# Patient Record
Sex: MALE | Race: WHITE | Employment: OTHER | URBAN - METROPOLITAN AREA
[De-identification: names, ages, dates, MRNs, and addresses within clinical notes are randomized per-mention and may not be internally consistent; named-entity substitution may affect disease eponyms.]

---

## 2017-03-20 ENCOUNTER — HOSPITAL ENCOUNTER (EMERGENCY)
Age: 27
Discharge: HOME | End: 2017-03-20
Attending: EMERGENCY MEDICINE
Payer: SELF-PAY

## 2017-03-20 VITALS
HEART RATE: 91 BPM | OXYGEN SATURATION: 98 % | BODY MASS INDEX: 32.47 KG/M2 | TEMPERATURE: 97.7 F | DIASTOLIC BLOOD PRESSURE: 93 MMHG | WEIGHT: 245 LBS | HEIGHT: 73 IN | SYSTOLIC BLOOD PRESSURE: 161 MMHG | RESPIRATION RATE: 18 BRPM

## 2017-03-20 DIAGNOSIS — J01.90 ACUTE NON-RECURRENT SINUSITIS, UNSPECIFIED LOCATION: Primary | ICD-10-CM

## 2017-03-20 DIAGNOSIS — R04.0 EPISTAXIS: ICD-10-CM

## 2017-03-20 DIAGNOSIS — I10 ESSENTIAL HYPERTENSION: ICD-10-CM

## 2017-03-20 LAB
ANION GAP BLD CALC-SCNC: 6 MMOL/L (ref 3–18)
APTT PPP: 33.6 SEC (ref 23–36.4)
ATRIAL RATE: 75 BPM
BASOPHILS # BLD AUTO: 0 K/UL (ref 0–0.1)
BASOPHILS # BLD: 0 % (ref 0–2)
BUN SERPL-MCNC: 15 MG/DL (ref 7–18)
BUN/CREAT SERPL: 16 (ref 12–20)
CALCIUM SERPL-MCNC: 9.6 MG/DL (ref 8.5–10.1)
CALCULATED P AXIS, ECG09: 68 DEGREES
CALCULATED R AXIS, ECG10: 46 DEGREES
CALCULATED T AXIS, ECG11: 56 DEGREES
CHLORIDE SERPL-SCNC: 103 MMOL/L (ref 100–108)
CK MB CFR SERPL CALC: 0.8 % (ref 0–4)
CK MB SERPL-MCNC: 1 NG/ML (ref 5–25)
CK SERPL-CCNC: 133 U/L (ref 39–308)
CO2 SERPL-SCNC: 32 MMOL/L (ref 21–32)
CREAT SERPL-MCNC: 0.92 MG/DL (ref 0.6–1.3)
DIAGNOSIS, 93000: NORMAL
DIFFERENTIAL METHOD BLD: ABNORMAL
EOSINOPHIL # BLD: 0.1 K/UL (ref 0–0.4)
EOSINOPHIL NFR BLD: 2 % (ref 0–5)
ERYTHROCYTE [DISTWIDTH] IN BLOOD BY AUTOMATED COUNT: 12.5 % (ref 11.6–14.5)
GLUCOSE SERPL-MCNC: 96 MG/DL (ref 74–99)
HCT VFR BLD AUTO: 44.6 % (ref 36–48)
HGB BLD-MCNC: 15.2 G/DL (ref 13–16)
INR PPP: 1 (ref 0.8–1.2)
LYMPHOCYTES # BLD AUTO: 21 % (ref 21–52)
LYMPHOCYTES # BLD: 1.8 K/UL (ref 0.9–3.6)
MCH RBC QN AUTO: 31.1 PG (ref 24–34)
MCHC RBC AUTO-ENTMCNC: 34.1 G/DL (ref 31–37)
MCV RBC AUTO: 91.2 FL (ref 74–97)
MONOCYTES # BLD: 1 K/UL (ref 0.05–1.2)
MONOCYTES NFR BLD AUTO: 11 % (ref 3–10)
NEUTS SEG # BLD: 5.7 K/UL (ref 1.8–8)
NEUTS SEG NFR BLD AUTO: 66 % (ref 40–73)
P-R INTERVAL, ECG05: 158 MS
PLATELET # BLD AUTO: 271 K/UL (ref 135–420)
PMV BLD AUTO: 9.2 FL (ref 9.2–11.8)
POTASSIUM SERPL-SCNC: 3.8 MMOL/L (ref 3.5–5.5)
PROTHROMBIN TIME: 12.5 SEC (ref 11.5–15.2)
Q-T INTERVAL, ECG07: 380 MS
QRS DURATION, ECG06: 94 MS
QTC CALCULATION (BEZET), ECG08: 424 MS
RBC # BLD AUTO: 4.89 M/UL (ref 4.7–5.5)
SODIUM SERPL-SCNC: 141 MMOL/L (ref 136–145)
TROPONIN I SERPL-MCNC: <0.02 NG/ML (ref 0–0.04)
VENTRICULAR RATE, ECG03: 75 BPM
WBC # BLD AUTO: 8.6 K/UL (ref 4.6–13.2)

## 2017-03-20 PROCEDURE — 85025 COMPLETE CBC W/AUTO DIFF WBC: CPT | Performed by: EMERGENCY MEDICINE

## 2017-03-20 PROCEDURE — 85610 PROTHROMBIN TIME: CPT | Performed by: EMERGENCY MEDICINE

## 2017-03-20 PROCEDURE — 99283 EMERGENCY DEPT VISIT LOW MDM: CPT

## 2017-03-20 PROCEDURE — 82550 ASSAY OF CK (CPK): CPT | Performed by: EMERGENCY MEDICINE

## 2017-03-20 PROCEDURE — 93005 ELECTROCARDIOGRAM TRACING: CPT

## 2017-03-20 PROCEDURE — 85730 THROMBOPLASTIN TIME PARTIAL: CPT | Performed by: EMERGENCY MEDICINE

## 2017-03-20 PROCEDURE — 80048 BASIC METABOLIC PNL TOTAL CA: CPT | Performed by: EMERGENCY MEDICINE

## 2017-03-20 RX ORDER — CODEINE PHOSPHATE AND GUAIFENESIN 10; 100 MG/5ML; MG/5ML
5 SOLUTION ORAL
Qty: 120 ML | Refills: 0 | Status: SHIPPED | OUTPATIENT
Start: 2017-03-20 | End: 2018-10-25

## 2017-03-20 RX ORDER — LORATADINE 10 MG/1
TABLET ORAL
Qty: 30 TAB | Refills: 0 | Status: SHIPPED | OUTPATIENT
Start: 2017-03-20 | End: 2018-10-25

## 2017-03-20 RX ORDER — TRIAMCINOLONE ACETONIDE 55 UG/1
SPRAY, METERED NASAL
Qty: 16.5 G | Refills: 0 | Status: SHIPPED | OUTPATIENT
Start: 2017-03-20 | End: 2018-10-25

## 2017-03-20 RX ORDER — AZITHROMYCIN 250 MG/1
TABLET, FILM COATED ORAL
Qty: 6 TAB | Refills: 0 | Status: SHIPPED | OUTPATIENT
Start: 2017-03-20 | End: 2017-08-07

## 2017-03-20 NOTE — LETTER
[address] [place] [address] Dr [facility] EMERGENCY DEPT 
[address] [facility] [address] 
[phone] Work/School Note Date: 3/20/2017 To Whom It May concern: 
 
Edith Ulloa was seen and treated today in the emergency room by the following provider(s): 
Attending Provider: Maria E Carver MD.   
 
Edith Ulloa may return to work on 3/22/17 Sincerely, 
 
 
Dr. Maria E Carver

## 2017-03-20 NOTE — DISCHARGE INSTRUCTIONS
SPECIFIC PATIENT INSTRUCTIONS FROM THE PHYSICIAN WHO TREATED YOU IN THE ER TODAY:  1. Return if any concerns or worsening of condition(s)  2. Zithromax as prescribed until finished. 3.  Nasacort AQ and claritin for the next week and then stop. If sinus congestion recurs, then restart the nasacort AQ and claritin for a week at a time. 4.  Use a Nedi Pot to help clear your sinuses. You may purchase the original Nedi Pot at a pharmacy or get a generic version at [employer] or [employer]. 5.  If prescribed, take the Robitussin AC cough syrup for cough. 6.   FOLLOW UP APPOINTMENT:  Your primary doctor in 1 week. 7.   Have you blood pressure recheck by your doctor this week. It was elevated during your Emergency Department visit today. In the days before you see your doctor, recheck your blood pressure at home 2 times a day at the same times. For example, you may decide to record your blood pressure at 8 am and 9 pm every day. Or 7 am and 7 pm every day. Then take this list of recorded blood pressure readings to your doctor when you follow up with them. This will help guide them about what needs to be done with your blood pressure, if anything. Sinusitis: Care Instructions  Your Care Instructions    Sinusitis is an infection of the lining of the sinus cavities in your head. Sinusitis often follows a cold. It causes pain and pressure in your head and face. In most cases, sinusitis gets better on its own in 1 to 2 weeks. But some mild symptoms may last for several weeks. Sometimes antibiotics are needed. Follow-up care is a key part of your treatment and safety. Be sure to make and go to all appointments, and call your doctor if you are having problems. It's also a good idea to know your test results and keep a list of the medicines you take. How can you care for yourself at home?   · Take an over-the-counter pain medicine, such as acetaminophen (Tylenol), ibuprofen (Advil, Motrin), or naproxen (Aleve). Read and follow all instructions on the label. · If the doctor prescribed antibiotics, take them as directed. Do not stop taking them just because you feel better. You need to take the full course of antibiotics. · Be careful when taking over-the-counter cold or flu medicines and Tylenol at the same time. Many of these medicines have acetaminophen, which is Tylenol. Read the labels to make sure that you are not taking more than the recommended dose. Too much acetaminophen (Tylenol) can be harmful. · Breathe warm, moist air from a steamy shower, a hot bath, or a sink filled with hot water. Avoid cold, dry air. Using a humidifier in your home may help. Follow the directions for cleaning the machine. · Use saline (saltwater) nasal washes to help keep your nasal passages open and wash out mucus and bacteria. You can buy saline nose drops at a grocery store or drugstore. Or you can make your own at home by adding 1 teaspoon of salt and 1 teaspoon of baking soda to 2 cups of distilled water. If you make your own, fill a bulb syringe with the solution, insert the tip into your nostril, and squeeze gently. [place] your nose. · Put a hot, wet towel or a warm gel pack on your face 3 or 4 times a day for 5 to 10 minutes each time. · Try a decongestant nasal spray like oxymetazoline (Afrin). Do not use it for more than 3 days in a row. Using it for more than 3 days can make your congestion worse. When should you call for help? Call your doctor now or seek immediate medical care if:  · You have new or worse swelling or redness in your face or around your eyes. · You have a new or higher fever. Watch closely for changes in your health, and be sure to contact your doctor if:  · You have new or worse facial pain. · The mucus from your nose becomes thicker (like pus) or has new blood in it. · You are not getting better as expected. Where can you learn more?   Go to http://selina-amrit.info/. Enter J466 in the search box to learn more about \"Sinusitis: Care Instructions. \"  Current as of: July 29, 2016  Content Version: 11.1  © 4059-8538 [employer]. Care instructions adapted under license by [employer] (which disclaims liability or warranty for this information). If you have questions about a medical condition or this instruction, always ask your healthcare professional. [address] any warranty or liability for your use of this information. Saline Nasal Washes: Care Instructions  Your Care Instructions  Saline nasal washes help keep the nasal passages open by washing out thick or dried mucus. This simple remedy can help relieve symptoms of allergies, sinusitis, and colds. It also can make the nose feel more comfortable by keeping the mucous membranes moist. You may notice a little burning sensation in your nose the first few times you use the solution, but this usually gets better in a few days. Follow-up care is a key part of your treatment and safety. Be sure to make and go to all appointments, and call your doctor if you are having problems. It's also a good idea to know your test results and keep a list of the medicines you take. How can you care for yourself at home? · You can buy premixed saline solution in a squeeze bottle or other sinus rinse products at a drugstore. Read and follow the instructions on the label. · You also can make your own saline solution by adding 1 teaspoon of salt and 1 teaspoon of baking soda to 2 cups of distilled water. · If you use a homemade solution, pour a small amount into a clean bowl. Using a rubber bulb syringe, squeeze the syringe and place the tip in the salt water. Pull a small amount of the salt water into the syringe by relaxing your hand. · Sit down with your head tilted slightly back. Do not lie down.  Put the tip of the bulb syringe or the squeeze bottle a little way into one of your nostrils. Gently drip or squirt a few drops into the nostril. Repeat with the other nostril. Some sneezing and gagging are normal at first.  · Gently blow your nose. · Wipe the syringe or bottle tip clean after each use. · Repeat this 2 or 3 times a day. · Use nasal washes gently if you have nosebleeds often. When should you call for help? Watch closely for changes in your health, and be sure to contact your doctor if:  · You often get nosebleeds. · You have problems doing the nasal washes. Where can you learn more? Go to http://selina-amrit.info/. Enter [id number] in the search box to learn more about \"Saline Nasal Washes: Care Instructions. \"  Current as of: July 29, 2016  Content Version: 11.1  © 8110-9796 [employer]. Care instructions adapted under license by [employer] (which disclaims liability or warranty for this information). If you have questions about a medical condition or this instruction, always ask your healthcare professional. [address] any warranty or liability for your use of this information. Nosebleeds: Care Instructions  Your Care Instructions    Nosebleeds are common, especially if you have colds or allergies. Many things can cause a nosebleed. Some nosebleeds stop on their own with pressure. Others need packing. Some get cauterized (sealed). If you have gauze or other packing materials in your nose, you will need to follow up with your doctor to have the packing removed. You may need more treatment if you get nosebleeds a lot. The doctor has checked you carefully, but problems can develop later. If you notice any problems or new symptoms, get medical treatment right away. Follow-up care is a key part of your treatment and safety. Be sure to make and go to all appointments, and call your doctor if you are having problems.  It's also a good idea to know your test results and keep a list of the medicines you take. How can you care for yourself at home? · If you get another nosebleed:  ¨ Sit up and tilt your head slightly forward. This keeps blood from going down your throat. ¨ Use your thumb and index finger to pinch your nose shut for 10 minutes. Use a clock. Do not check to see if the bleeding has stopped before the 10 minutes are up. If the bleeding has not stopped, pinch your nose shut for another 10 minutes. ¨ When the bleeding has stopped, try not to pick, rub, or blow your nose for 12 hours. Avoiding these things helps keep your nose from bleeding again. · If your doctor prescribed antibiotics, take them as directed. Do not stop taking them just because you feel better. You need to take the full course of antibiotics. To prevent nosebleeds  · Do not blow your nose too hard. · Try not to lift or strain after a nosebleed. · Raise your head on a pillow while you sleep. · Put a thin layer of a saline- or water-based nasal gel, such as NasoGel, inside your nose. Put it on the septum, which divides your nostrils. This will prevent dryness that can cause nosebleeds. · Use a vaporizer or humidifier to add moisture to your bedroom. Follow the directions for cleaning the machine. · Do not use aspirin, ibuprofen (Advil, Motrin), or naproxen (Aleve) for 36 to 48 hours after a nosebleed unless your doctor tells you to. You can use acetaminophen (Tylenol) for pain relief. · Talk to your doctor about stopping any other medicines you are taking. Some medicines may make you more likely to get a nosebleed. · Do not use cold medicines or nasal sprays without first talking to your doctor. They can make your nose dry. When should you call for help? Call 911 anytime you think you may need emergency care. For example, call if:  · You passed out (lost consciousness).   Call your doctor now or seek immediate medical care if:  · You get another nosebleed and your nose is still bleeding after you have applied pressure 3 times for 10 minutes each time (30 minutes total). · There is a lot of blood running down the back of your throat even after you pinch your nose and tilt your head forward. · You have a fever. · You have sinus pain. Watch closely for changes in your health, and be sure to contact your doctor if:  · You get nosebleeds often, even if they stop. · You do not get better as expected. Where can you learn more? Go to http://selina-amrit.info/. Enter S156 in the search box to learn more about \"Nosebleeds: Care Instructions. \"  Current as of: May 27, 2016  Content Version: 11.1  © 4168-6466 [employer]. Care instructions adapted under license by [employer] (which disclaims liability or warranty for this information). If you have questions about a medical condition or this instruction, always ask your healthcare professional. [place] [address] any warranty or liability for your use of this information. High Blood Pressure: Care Instructions  Your Care Instructions  If your blood pressure is usually above 140/90, you have high blood pressure, or hypertension. That means the top number is 140 or higher or the bottom number is 90 or higher, or both. Despite what a lot of people think, high blood pressure usually doesn't cause headaches or make you feel dizzy or lightheaded. It usually has no symptoms. But it does increase your risk for heart attack, stroke, and kidney or eye damage. The higher your blood pressure, the more your risk increases. Your doctor will give you a goal for your blood pressure. Your goal will be based on your health and your age. An example of a goal is to keep your blood pressure below 140/90. Lifestyle changes, such as eating healthy and being active, are always important to help lower blood pressure.  You might also take medicine to reach your blood pressure goal.  Follow-up care is a key part of your treatment and safety. Be sure to make and go to all appointments, and call your doctor if you are having problems. It's also a good idea to know your test results and keep a list of the medicines you take. How can you care for yourself at home? Medical treatment  · If you stop taking your medicine, your blood pressure will go back up. You may take one or more types of medicine to lower your blood pressure. Be safe with medicines. Take your medicine exactly as prescribed. Call your doctor if you think you are having a problem with your medicine. · Talk to your doctor before you start taking aspirin every day. Aspirin can help certain people lower their risk of a heart attack or stroke. But taking aspirin isn't right for everyone, because it can cause serious bleeding. · See your doctor regularly. You may need to see the doctor more often at first or until your blood pressure comes down. · If you are taking blood pressure medicine, talk to your doctor before you take decongestants or anti-inflammatory medicine, such as ibuprofen. Some of these medicines can raise blood pressure. · Learn how to check your blood pressure at home. Lifestyle changes  · Stay at a healthy weight. This is especially important if you put on weight around the waist. Losing even 10 pounds can help you lower your blood pressure. · If your doctor recommends it, get more exercise. Walking is a good choice. Bit by bit, increase the amount you walk every day. Try for at least 30 minutes on most days of the week. You also may want to swim, bike, or do other activities. · Avoid or limit alcohol. Talk to your doctor about whether you can drink any alcohol. · Try to limit how much sodium you eat to less than 2,300 milligrams (mg) a day. Your doctor may ask you to try to eat less than 1,500 mg a day. · Eat plenty of fruits (such as bananas and oranges), vegetables, legumes, whole grains, and low-fat dairy products.   · Lower the amount of saturated fat in your diet. Saturated fat is found in animal products such as milk, cheese, and meat. Limiting these foods may help you lose weight and also lower your risk for heart disease. · Do not smoke. Smoking increases your risk for heart attack and stroke. If you need help quitting, talk to your doctor about stop-smoking programs and medicines. These can increase your chances of quitting for good. When should you call for help? Call 911 anytime you think you may need emergency care. This may mean having symptoms that suggest that your blood pressure is causing a serious heart or blood vessel problem. Your blood pressure may be over 180/110. For example, call 911 if:  · You have symptoms of a heart attack. These may include:  ¨ Chest pain or pressure, or a strange feeling in the chest.  ¨ Sweating. ¨ Shortness of breath. ¨ Nausea or vomiting. ¨ Pain, pressure, or a strange feeling in the back, neck, jaw, or upper belly or in one or both shoulders or arms. ¨ Lightheadedness or sudden weakness. ¨ A fast or irregular heartbeat. · You have symptoms of a stroke. These may include:  ¨ Sudden numbness, tingling, weakness, or loss of movement in your face, arm, or leg, especially on only one side of your body. ¨ Sudden vision changes. ¨ Sudden trouble speaking. ¨ Sudden confusion or trouble understanding simple statements. ¨ Sudden problems with walking or balance. ¨ A sudden, severe headache that is different from past headaches. · You have severe back or belly pain. Do not wait until your blood pressure comes down on its own. Get help right away. Call your doctor now or seek immediate care if:  · Your blood pressure is much higher than normal (such as 180/110 or higher), but you don't have symptoms. · You think high blood pressure is causing symptoms, such as:  ¨ Severe headache. ¨ Blurry vision.   Watch closely for changes in your health, and be sure to contact your doctor if:  · Your blood pressure measures 140/90 or higher at least 2 times. That means the top number is 140 or higher or the bottom number is 90 or higher, or both. · You think you may be having side effects from your blood pressure medicine. · Your blood pressure is usually normal, but it goes above normal at least 2 times. Where can you learn more? Go to http://selina-amrit.info/. Enter E066 in the search box to learn more about \"High Blood Pressure: Care Instructions. \"  Current as of: 2016  Content Version: 11.1  © 4321-5045 [employer]. Care instructions adapted under license by [employer] (which disclaims liability or warranty for this information). If you have questions about a medical condition or this instruction, always ask your healthcare professional. [address] any warranty or liability for your use of this information. "[employer] Activation    Thank you for requesting access to "[employer]. Please follow the instructions below to securely access and download your online medical record. "[employer] allows you to send messages to your doctor, view your test results, renew your prescriptions, schedule appointments, and more. How Do I Sign Up? 1. In your internet browser, go to [email or web address]. 2. Click on the First Time User? Click Here link in the Sign In box. You will see the New Member Sign Up page. 3. Enter your "[employer] Access Code exactly as it appears below. You will not need to use this code after youve completed the sign-up process. If you do not sign up before the expiration date, you must request a new code. "[employer] Access Code: Activation code not generated  Current "[employer] Status: Active (This is the date your "[employer] access code will )    4. Enter the last four digits of your Social Security Number (xxxx) and Date of Birth (mm/dd/yyyy) as indicated and click Submit.  You will be taken to the next sign-up page. 5. Create a FindYogit ID. This will be your [employer] login ID and cannot be changed, so think of one that is secure and easy to remember. 6. Create a [employer] password. You can change your password at any time. 7. Enter your Password Reset Question and Answer. This can be used at a later time if you forget your password. 8. Enter your e-mail address. You will receive e-mail notification when new information is available in [address]. 9. Click Sign Up. You can now view and download portions of your medical record. 10. Click the Download Summary menu link to download a portable copy of your medical information. Additional Information    If you have questions, please visit the Frequently Asked Questions section of the [employer] website at [email or web address] Remember, [employer] is NOT to be used for urgent needs. For medical emergencies, dial 911.

## 2017-03-20 NOTE — ED PROVIDER NOTES
[facility]  [facility] EMERGENCY DEPT      32 y.o. male with noted past medical history who presents to the emergency department c/o cold-like symptoms for one month. Pt reports symptoms of congestion, productive cough, HA, blurry vision, epistaxsis, loss of appetite and fatigue. Pt used Zicam with no relief. Pt reports recent high blood pressure readings. Pt does not take HTN medication. Pt admits to tobacco use. No other complaints. No current facility-administered medications for this encounter. Current Outpatient Prescriptions   Medication Sig    albuterol (PROVENTIL HFA, VENTOLIN HFA, PROAIR HFA) 90 mcg/actuation inhaler Take 2 Puffs by inhalation every four (4) hours as needed for Wheezing or Shortness of Breath (Cough).  inhalational spacing device ALWAYS USE WITH INHALER    erythromycin (ILOTYCIN) ophthalmic ointment Apply OU 6 times per day x 10 days. Apply 1 cm ribbon to the lower conjunctival sac. Past Medical History:   Diagnosis Date    Back injury     Back problem     Depression     Fractures     3 slip disk;     History of ear infections     Hypertension     Suicidal ideation        Past Surgical History:   Procedure Laterality Date    HX TYMPANOSTOMY         Family History   Problem Relation Age of Onset    Cancer Father      mesothelioma       Social History     Social History    Marital status: SINGLE     Spouse name: N/A    Number of children: N/A    Years of education: N/A     Occupational History    Not on file.      Social History Main Topics    Smoking status: Current Every Day Smoker     Packs/day: 1.00     Years: 12.00    Smokeless tobacco: Not on file    Alcohol use 4.0 oz/week     8 Cans of beer per week      Comment: social    Drug use: Yes     Special: Marijuana    Sexual activity: Yes     Partners: Female     Other Topics Concern    Not on file     Social History Narrative       Allergies   Allergen Reactions    Amoxicillin Swelling       Patient's primary care provider (as noted in EPIC):  None    REVIEW OF SYSTEMS:    Constitutional:  Negative for diaphoresis. Positive for fatigue and loss of appetite. HENT:  Positive for congestion, productive cough, epistaxis   EYE: Positive for blurry vision  Respiratory:  Negative for cough and shortness of breath. Cardiovascular:  Negative for chest pain and palpitations. Gastrointestinal:  Negative for diarrhea. Genitourinary:  Negative for flank pain. Musculoskeletal:  Negative for back pain. Positive for cough with CP  Skin:  Negative for pallor. Neurological:  Negative for weakness. Positive for HA    Visit Vitals    BP (!) 161/93 (BP 1 Location: Left arm, BP Patient Position: At rest)    Pulse 91    Temp 97.7 °F (36.5 °C)    Resp 18    Ht 6' 1\" (1.854 m)    Wt 111.1 kg (245 lb)    SpO2 98%    BMI 32.32 kg/m2       PHYSICAL EXAM:    CONSTITUTIONAL:  Alert, in no apparent distress;  well developed;  well nourished. HEAD:  Normocephalic, atraumatic. Sinuses:  Sinus pressure with leaning head forward. Sinus tenderness to percussion. EYES:  EOMI. Non-icteric sclera. Normal conjunctiva. ENTM:  Nose:  no rhinorrhea. Throat:  no erythema or exudate, mucous membranes moist.  NECK:  No JVD. Supple  RESPIRATORY:  Chest clear, equal breath sounds, good air movement. CARDIOVASCULAR:  Regular rate and rhythm. No murmurs, rubs, or gallops. GI:  Normal bowel sounds, abdomen soft and non-tender. No rebound or guarding. BACK:  Non-tender. UPPER EXT:  Normal inspection. LOWER EXT:  No edema, no calf tenderness. Distal pulses intact. NEURO:  Moves all four extremities, and grossly normal motor exam.  SKIN:  No rashes;  Normal for age. PSYCH:  Alert and normal affect.     Abnormal lab results from this emergency department encounter:  Labs Reviewed   CBC WITH AUTOMATED DIFF - Abnormal; Notable for the following:        Result Value    MONOCYTES 11 (*)     All other components within normal limits   METABOLIC PANEL, BASIC   PROTHROMBIN TIME + INR   PTT   CARDIAC PANEL,(CK, CKMB & TROPONIN)       Lab values for this patient within approximately the last 12 hours:  Recent Results (from the past 12 hour(s))   CBC WITH AUTOMATED DIFF    Collection Time: 03/20/17  2:01 PM   Result Value Ref Range    WBC 8.6 4.6 - 13.2 K/uL    RBC 4.89 4.70 - 5.50 M/uL    HGB 15.2 13.0 - 16.0 g/dL    HCT 44.6 36.0 - 48.0 %    MCV 91.2 74.0 - 97.0 FL    MCH 31.1 24.0 - 34.0 PG    MCHC 34.1 31.0 - 37.0 g/dL    RDW 12.5 11.6 - 14.5 %    PLATELET 672 308 - 753 K/uL    MPV 9.2 9.2 - 11.8 FL    NEUTROPHILS 66 40 - 73 %    LYMPHOCYTES 21 21 - 52 %    MONOCYTES 11 (H) 3 - 10 %    EOSINOPHILS 2 0 - 5 %    BASOPHILS 0 0 - 2 %    ABS. NEUTROPHILS 5.7 1.8 - 8.0 K/UL    ABS. LYMPHOCYTES 1.8 0.9 - 3.6 K/UL    ABS. MONOCYTES 1.0 0.05 - 1.2 K/UL    ABS. EOSINOPHILS 0.1 0.0 - 0.4 K/UL    ABS.  BASOPHILS 0.0 0.0 - 0.1 K/UL    DF AUTOMATED     METABOLIC PANEL, BASIC    Collection Time: 03/20/17  2:01 PM   Result Value Ref Range    Sodium 141 136 - 145 mmol/L    Potassium 3.8 3.5 - 5.5 mmol/L    Chloride 103 100 - 108 mmol/L    CO2 32 21 - 32 mmol/L    Anion gap 6 3.0 - 18 mmol/L    Glucose 96 74 - 99 mg/dL    BUN 15 7.0 - 18 MG/DL    Creatinine 0.92 0.6 - 1.3 MG/DL    BUN/Creatinine ratio 16 12 - 20      GFR est AA >60 >60 ml/min/1.73m2    GFR est non-AA >60 >60 ml/min/1.73m2    Calcium 9.6 8.5 - 10.1 MG/DL   PROTHROMBIN TIME + INR    Collection Time: 03/20/17  2:01 PM   Result Value Ref Range    Prothrombin time 12.5 11.5 - 15.2 sec    INR 1.0 0.8 - 1.2     PTT    Collection Time: 03/20/17  2:01 PM   Result Value Ref Range    aPTT 33.6 23.0 - 36.4 SEC   CARDIAC PANEL,(CK, CKMB & TROPONIN)    Collection Time: 03/20/17  2:01 PM   Result Value Ref Range     39 - 308 U/L    CK - MB 1.0 <3.6 ng/ml    CK-MB Index 0.8 0.0 - 4.0 %    Troponin-I, Qt. <0.02 0.0 - 0.045 NG/ML   EKG, 12 LEAD, INITIAL    Collection Time: 03/20/17  2:05 PM   Result Value Ref Range    Ventricular Rate 75 BPM    Atrial Rate 75 BPM    P-R Interval 158 ms    QRS Duration 94 ms    Q-T Interval 380 ms    QTC Calculation (Bezet) 424 ms    Calculated P Axis 68 degrees    Calculated R Axis 46 degrees    Calculated T Axis 56 degrees    Diagnosis       Normal sinus rhythm  Nonspecific ST and T wave abnormality  Abnormal ECG  When compared with ECG of 14-JUN-2016 03:05,  No significant change was found         Radiologist and cardiologist interpretations if available at time of this note:  No orders to display       Medication(s) ordered for patient during this emergency visit encounter:  Medications - No data to display    ED COURSE:      DIAGNOSIS:  1. Acute sinusitis  2. Poorly controlled hypertension  3.  Epistaxis. SPECIFIC PATIENT INSTRUCTIONS FROM THE PHYSICIAN WHO TREATED YOU IN THE ER TODAY:  1. Return if any concerns or worsening of condition(s)  2. Zithromax as prescribed until finished. 3.  Nasacort AQ and claritin for the next week and then stop. If sinus congestion recurs, then restart the nasacort AQ and claritin for a week at a time. 4.  Use a Nedi Pot to help clear your sinuses. You may purchase the original Nedi Pot at a pharmacy or get a generic version at [employer] or [employer]. 5.  If prescribed, take the Robitussin AC cough syrup for cough. 6.   FOLLOW UP APPOINTMENT:  Your primary doctor in 1 week. 7.   Have you blood pressure recheck by your doctor this week. It was elevated during your Emergency Department visit today. In the days before you see your doctor, recheck your blood pressure at home 2 times a day at the same times. For example, you may decide to record your blood pressure at 8 am and 9 pm every day. Or 7 am and 7 pm every day. Then take this list of recorded blood pressure readings to your doctor when you follow up with them. This will help guide them about what needs to be done with your blood pressure, if anything. Jean Pierre Chun M.D. Provider Attestation:  If a scribe was utilized in generation of this patient record, I personally performed the services described in the documentation, reviewed the documentation, as recorded by the scribe in my presence, and it accurately records the patient's history of presenting illness, review of systems, patient physical examination, and procedures performed by me as the attending physician. Jean Pierre Chun M.D.   [facility] Board Certified Emergency Physician  3/20/2017.  3:03 PM    Scribe Attestation:     Yuni Medina, scribing for and in the presence of  Annie Villareal MD March 20, 2017 at 3:06 PM     Signed by: Homer Hinson, 03/20/17, 3:06 PM

## 2017-06-02 PROCEDURE — 94640 AIRWAY INHALATION TREATMENT: CPT

## 2017-06-02 PROCEDURE — 77030029684 HC NEB SM VOL KT MONA -A

## 2017-06-02 PROCEDURE — 99283 EMERGENCY DEPT VISIT LOW MDM: CPT

## 2017-06-02 PROCEDURE — 93005 ELECTROCARDIOGRAM TRACING: CPT

## 2017-06-03 ENCOUNTER — APPOINTMENT (OUTPATIENT)
Dept: GENERAL RADIOLOGY | Age: 27
End: 2017-06-03
Attending: EMERGENCY MEDICINE
Payer: SELF-PAY

## 2017-06-03 ENCOUNTER — HOSPITAL ENCOUNTER (EMERGENCY)
Age: 27
Discharge: HOME | End: 2017-06-03
Attending: EMERGENCY MEDICINE
Payer: SELF-PAY

## 2017-06-03 VITALS
BODY MASS INDEX: 34.42 KG/M2 | DIASTOLIC BLOOD PRESSURE: 86 MMHG | WEIGHT: 260.9 LBS | RESPIRATION RATE: 18 BRPM | SYSTOLIC BLOOD PRESSURE: 130 MMHG | TEMPERATURE: 97.5 F | HEART RATE: 87 BPM | OXYGEN SATURATION: 95 %

## 2017-06-03 DIAGNOSIS — J45.21 REACTIVE AIRWAY DISEASE, MILD INTERMITTENT, WITH ACUTE EXACERBATION: Primary | ICD-10-CM

## 2017-06-03 LAB
ATRIAL RATE: 85 BPM
CALCULATED P AXIS, ECG09: 36 DEGREES
CALCULATED R AXIS, ECG10: 52 DEGREES
CALCULATED T AXIS, ECG11: 44 DEGREES
DIAGNOSIS, 93000: NORMAL
P-R INTERVAL, ECG05: 156 MS
Q-T INTERVAL, ECG07: 364 MS
QRS DURATION, ECG06: 92 MS
QTC CALCULATION (BEZET), ECG08: 433 MS
VENTRICULAR RATE, ECG03: 85 BPM

## 2017-06-03 PROCEDURE — 74011000250 HC RX REV CODE- 250: Performed by: PHYSICIAN ASSISTANT

## 2017-06-03 PROCEDURE — 74011250637 HC RX REV CODE- 250/637: Performed by: PHYSICIAN ASSISTANT

## 2017-06-03 PROCEDURE — 74011636637 HC RX REV CODE- 636/637: Performed by: PHYSICIAN ASSISTANT

## 2017-06-03 PROCEDURE — 71020 XR CHEST PA LAT: CPT

## 2017-06-03 RX ORDER — PREDNISONE 20 MG/1
60 TABLET ORAL
Status: COMPLETED | OUTPATIENT
Start: 2017-06-03 | End: 2017-06-03

## 2017-06-03 RX ORDER — IPRATROPIUM BROMIDE AND ALBUTEROL SULFATE 2.5; .5 MG/3ML; MG/3ML
3 SOLUTION RESPIRATORY (INHALATION)
Status: COMPLETED | OUTPATIENT
Start: 2017-06-03 | End: 2017-06-03

## 2017-06-03 RX ORDER — PREDNISONE 20 MG/1
60 TABLET ORAL DAILY
Qty: 15 TAB | Refills: 0 | Status: SHIPPED | OUTPATIENT
Start: 2017-06-03 | End: 2017-06-08

## 2017-06-03 RX ORDER — ALBUTEROL SULFATE 90 UG/1
2 AEROSOL, METERED RESPIRATORY (INHALATION)
Qty: 1 INHALER | Refills: 0 | Status: SHIPPED | OUTPATIENT
Start: 2017-06-03 | End: 2018-10-25 | Stop reason: SDUPTHER

## 2017-06-03 RX ORDER — ALBUTEROL SULFATE 90 UG/1
2 AEROSOL, METERED RESPIRATORY (INHALATION)
Status: DISCONTINUED | OUTPATIENT
Start: 2017-06-03 | End: 2017-06-03 | Stop reason: HOSPADM

## 2017-06-03 RX ADMIN — IPRATROPIUM BROMIDE AND ALBUTEROL SULFATE 3 ML: .5; 3 SOLUTION RESPIRATORY (INHALATION) at 02:10

## 2017-06-03 RX ADMIN — IPRATROPIUM BROMIDE AND ALBUTEROL SULFATE 3 ML: .5; 3 SOLUTION RESPIRATORY (INHALATION) at 02:42

## 2017-06-03 RX ADMIN — ALBUTEROL SULFATE 2 PUFF: 90 AEROSOL, METERED RESPIRATORY (INHALATION) at 03:32

## 2017-06-03 RX ADMIN — PREDNISONE 60 MG: 20 TABLET ORAL at 02:42

## 2017-06-03 NOTE — LETTER
[facility] EMERGENCY DEPT 
[address] [facility] [address] 
[phone] Work/School Note Date: 6/2/2017 To Whom It May concern: 
 
Carmencita Angeles was seen and treated today in the emergency room by the following provider(s): 
Attending Provider: Constantine Mortimer, MD 
Physician Assistant: Jeniffer Edmonds PA-C. Carmencita Angeles may return to work on 6/6/17. Sincerely, Jeniffer Edmonds PA-C

## 2017-06-03 NOTE — ED NOTES
Hardcopy discharge paperwork signed and completed by all parties, copy scanned into chart. Due to malfunction in signature keypad.

## 2017-06-03 NOTE — ED PROVIDER NOTES
HPI Comments: 1:39 AM Andreina Michel is a 32 y.o. male with a history of HTN  who presents to the emergency department c/o SOB onset 2 days ago. Pt reports associated symptoms of fatigue. Pt notes he breathes a lot of debris at work. The patient explains he has been in bed all day, and his son is sick with the cold. Pt also c/o intermittent rash on L leg. Pt denies cough and fever. No other concerns at this time. PCP: None      The history is provided by the patient. Past Medical History:   Diagnosis Date    Back injury     Back problem     Depression     Fractures     3 slip disk;     History of ear infections     Hypertension     Suicidal ideation        Past Surgical History:   Procedure Laterality Date    HX TYMPANOSTOMY           Family History:   Problem Relation Age of Onset    Cancer Father      mesothelioma       Social History     Social History    Marital status: SINGLE     Spouse name: N/A    Number of children: N/A    Years of education: N/A     Occupational History    Not on file. Social History Main Topics    Smoking status: Current Every Day Smoker     Packs/day: 1.00     Years: 12.00    Smokeless tobacco: Not on file    Alcohol use 4.0 oz/week     8 Cans of beer per week      Comment: social    Drug use: Yes     Special: Marijuana    Sexual activity: Yes     Partners: Female     Other Topics Concern    Not on file     Social History Narrative         ALLERGIES: Amoxicillin    Review of Systems   Constitutional: Positive for fatigue. Negative for chills and fever. HENT: Negative for congestion, rhinorrhea and sore throat. Respiratory: Positive for shortness of breath. Negative for cough. Cardiovascular: Negative for chest pain and palpitations. Gastrointestinal: Negative for abdominal pain, diarrhea, nausea and vomiting. Genitourinary: Negative for dysuria, hematuria and urgency. Musculoskeletal: Negative for myalgias.    Skin: Positive for rash (L leg). Negative for wound. Neurological: Negative for dizziness and headaches. All other systems reviewed and are negative. Vitals:    06/03/17 0002   BP: 143/88   Pulse: 88   Resp: 17   Temp: 98.1 °F (36.7 °C)   SpO2: 96%   Weight: 118.3 kg (260 lb 14.4 oz)            Physical Exam   Constitutional: He is oriented to person, place, and time. He appears well-developed and well-nourished. No distress. HENT:   Head: Normocephalic and atraumatic. Eyes: Conjunctivae are normal.   Neck: Normal range of motion. Neck supple. Cardiovascular: Normal rate, regular rhythm and normal heart sounds. Pulmonary/Chest: Effort normal. No respiratory distress. He has wheezes (mild, bilat). He has no rales. Musculoskeletal: Normal range of motion. Neurological: He is alert and oriented to person, place, and time. Skin: Skin is warm and dry. Small scattered pustules to lower extremities. Psychiatric: He has a normal mood and affect. His behavior is normal. Judgment and thought content normal.   Nursing note and vitals reviewed. MDM  Number of Diagnoses or Management Options  Reactive airway disease, mild intermittent, with acute exacerbation:     ED Course       Procedures    -------------------------------------------------------------------------------------------------------------------     EKG INTERPRETATIONS:      RADIOLOGY RESULTS:   XR CHEST PA LAT    (Results Pending)       LABORATORY RESULTS:  No results found for this or any previous visit (from the past 12 hour(s)). CONSULTATIONS:        PROGRESS NOTES:    3:21 AM Pt well appearing and in NAD. CXR without acute process. Feeling better with nebs and steroid. Most likely 2/2 to exposures at work. Will d/h to f/u with [facility] for further eval.     Lengthy D/W pt regarding possible worsening of pt's condition, need for follow up and strict ED return instructions for any worsening symptoms.      DISPOSITION:  ED DIAGNOSIS & DISPOSITION INFORMATION  Diagnosis:   1. Reactive airway disease, mild intermittent, with acute exacerbation          Disposition: home    Follow-up Information     Follow up With Details Comments [address]  As needed [address] [place] [address]    [facility] EMERGENCY DEPT  Immediately if symptoms worsen [address] [address]  [phone]          Patient's Medications   Start Taking    ALBUTEROL (PROVENTIL HFA, VENTOLIN HFA, PROAIR HFA) 90 MCG/ACTUATION INHALER    Take 2 Puffs by inhalation every four (4) hours as needed for Wheezing. PREDNISONE (DELTASONE) 20 MG TABLET    Take 3 Tabs by mouth daily for 5 days. With Breakfast   Continue Taking    AZITHROMYCIN (ZITHROMAX Z-CONNIE) 250 MG TABLET    Take two tablets today then one tablet daily    GUAIFENESIN-CODEINE (ROBITUSSIN AC) 100-10 MG/5 ML SOLUTION    Take 5 mL by mouth three (3) times daily as needed for Cough. Max Daily Amount: 15 mL. IBUPROFEN (MOTRIN) 600 MG TABLET    Take 1 Tab by mouth every six (6) hours as needed for Pain. LORATADINE (CLARITIN) 10 MG TABLET    Take 1 tab by mouth daily for seven (7) days. Continue after 7 days only if symptoms are still present. Restart for 7 day intervals if sinus congestion symptoms return. ONDANSETRON (ZOFRAN ODT) 4 MG DISINTEGRATING TABLET    Take 1 Tab by mouth every eight (8) hours as needed for Nausea. TRIAMCINOLONE (NASACORT AQ) 55 MCG NASAL INHALER    Apply 2 sprays to each nostril BID for 7 days and then as needed after that.    These Medications have changed    No medications on file   Stop Taking    No medications on file

## 2017-08-07 ENCOUNTER — APPOINTMENT (OUTPATIENT)
Dept: GENERAL RADIOLOGY | Age: 27
End: 2017-08-07
Attending: NURSE PRACTITIONER
Payer: COMMERCIAL

## 2017-08-07 ENCOUNTER — HOSPITAL ENCOUNTER (EMERGENCY)
Age: 27
Discharge: HOME | End: 2017-08-07
Attending: EMERGENCY MEDICINE
Payer: COMMERCIAL

## 2017-08-07 VITALS
HEIGHT: 73 IN | TEMPERATURE: 98.8 F | HEART RATE: 80 BPM | SYSTOLIC BLOOD PRESSURE: 130 MMHG | RESPIRATION RATE: 20 BRPM | BODY MASS INDEX: 33.13 KG/M2 | WEIGHT: 250 LBS | OXYGEN SATURATION: 98 % | DIASTOLIC BLOOD PRESSURE: 86 MMHG

## 2017-08-07 DIAGNOSIS — Z20.2 STD EXPOSURE: ICD-10-CM

## 2017-08-07 DIAGNOSIS — S93.402A SPRAIN OF LEFT ANKLE, UNSPECIFIED LIGAMENT, INITIAL ENCOUNTER: Primary | ICD-10-CM

## 2017-08-07 LAB
APPEARANCE UR: CLEAR
BACTERIA URNS QL MICRO: NEGATIVE /HPF
BILIRUB UR QL: NEGATIVE
COLOR UR: YELLOW
EPITH CASTS URNS QL MICRO: NORMAL /LPF (ref 0–5)
GLUCOSE UR STRIP.AUTO-MCNC: NEGATIVE MG/DL
HGB UR QL STRIP: NEGATIVE
KETONES UR QL STRIP.AUTO: NEGATIVE MG/DL
LEUKOCYTE ESTERASE UR QL STRIP.AUTO: ABNORMAL
NITRITE UR QL STRIP.AUTO: NEGATIVE
PH UR STRIP: 5.5 [PH] (ref 5–8)
PROT UR STRIP-MCNC: NEGATIVE MG/DL
RBC #/AREA URNS HPF: NEGATIVE /HPF (ref 0–5)
SP GR UR REFRACTOMETRY: 1.03 (ref 1–1.03)
UROBILINOGEN UR QL STRIP.AUTO: 1 EU/DL (ref 0.2–1)
WBC URNS QL MICRO: NORMAL /HPF (ref 0–4)

## 2017-08-07 PROCEDURE — L4350 ANKLE CONTROL ORTHO PRE OTS: HCPCS

## 2017-08-07 PROCEDURE — 87491 CHLMYD TRACH DNA AMP PROBE: CPT | Performed by: NURSE PRACTITIONER

## 2017-08-07 PROCEDURE — 74011250637 HC RX REV CODE- 250/637: Performed by: NURSE PRACTITIONER

## 2017-08-07 PROCEDURE — 73610 X-RAY EXAM OF ANKLE: CPT

## 2017-08-07 PROCEDURE — 81001 URINALYSIS AUTO W/SCOPE: CPT | Performed by: NURSE PRACTITIONER

## 2017-08-07 PROCEDURE — 99283 EMERGENCY DEPT VISIT LOW MDM: CPT

## 2017-08-07 RX ORDER — IBUPROFEN 600 MG/1
600 TABLET ORAL
Qty: 20 TAB | Refills: 0 | Status: SHIPPED | OUTPATIENT
Start: 2017-08-07 | End: 2018-10-25

## 2017-08-07 RX ORDER — HYDROCODONE BITARTRATE AND ACETAMINOPHEN 5; 325 MG/1; MG/1
1 TABLET ORAL
Qty: 10 TAB | Refills: 0 | Status: SHIPPED | OUTPATIENT
Start: 2017-08-07 | End: 2018-10-25

## 2017-08-07 RX ORDER — AZITHROMYCIN 250 MG/1
2000 TABLET, FILM COATED ORAL
Status: COMPLETED | OUTPATIENT
Start: 2017-08-07 | End: 2017-08-07

## 2017-08-07 RX ORDER — ONDANSETRON 4 MG/1
4 TABLET, ORALLY DISINTEGRATING ORAL
Status: COMPLETED | OUTPATIENT
Start: 2017-08-07 | End: 2017-08-07

## 2017-08-07 RX ADMIN — ONDANSETRON 4 MG: 4 TABLET, ORALLY DISINTEGRATING ORAL at 14:43

## 2017-08-07 RX ADMIN — AZITHROMYCIN 2000 MG: 250 TABLET, FILM COATED ORAL at 14:43

## 2017-08-07 NOTE — ED PROVIDER NOTES
HPI Comments: 1:45 PM   32 y.o. male presents to ED C/O left ankle pain, concern for chlamydia. Patient has a HX of depression, HTN, back problems. Patient reports he rolled his left ankle yesterday running after his pig, worsening pain today, especially when he tries to walk on it. Patient denies significant foot pain, denies loss of sensation to left foot. Patient also wants to be checked for STDs, his wife was just found positive for chlamydia. Patient denies dysuria, penile discharge or lesions. Patient smokes 1ppd. Patient denies any other symptoms or complaints. The history is provided by the patient. History limited by: Justine magallon. Past Medical History:   Diagnosis Date    Back injury     Back problem     Depression     Fractures     3 slip disk;     History of ear infections     Hypertension     Suicidal ideation        Past Surgical History:   Procedure Laterality Date    HX TYMPANOSTOMY           Family History:   Problem Relation Age of Onset    Cancer Father      mesothelioma       Social History     Social History    Marital status: SINGLE     Spouse name: N/A    Number of children: N/A    Years of education: N/A     Occupational History    Not on file. Social History Main Topics    Smoking status: Current Every Day Smoker     Packs/day: 1.00     Years: 12.00    Smokeless tobacco: Never Used    Alcohol use 4.0 oz/week     8 Cans of beer per week      Comment: social    Drug use: Yes     Special: Marijuana    Sexual activity: Yes     Partners: Female     Other Topics Concern    Not on file     Social History Narrative         ALLERGIES: Amoxicillin    Review of Systems   Respiratory: Negative for cough and shortness of breath. Cardiovascular: Negative for chest pain. Gastrointestinal: Negative for abdominal distention and abdominal pain. Genitourinary: Negative for discharge, dysuria, penile swelling, scrotal swelling and testicular pain. Musculoskeletal: Positive for arthralgias and joint swelling. Left ankle pain and swelling        Vitals:    08/07/17 1344   BP: 130/86   Pulse: 80   Resp: 20   Temp: 98.8 °F (37.1 °C)   SpO2: 98%   Weight: 113.4 kg (250 lb)   Height: 6' 1\" (1.854 m)            Physical Exam   Constitutional: He is oriented to person, place, and time. He appears well-developed and well-nourished. No distress. HENT:   Head: Normocephalic and atraumatic. Mouth/Throat: Oropharynx is clear and moist.   Cardiovascular: Normal rate, regular rhythm, normal heart sounds and intact distal pulses. Pulmonary/Chest: Effort normal and breath sounds normal. No respiratory distress. He has no wheezes. He has no rales. Abdominal: Soft. Bowel sounds are normal. He exhibits no distension. There is no tenderness. There is no rebound and no guarding. Genitourinary: Penis normal. Right testis shows no mass, no swelling and no tenderness. Left testis shows no mass, no swelling and no tenderness. Circumcised. No penile erythema or penile tenderness. No discharge found. Genitourinary Comments: Rod Rina EDT chaperoned    Musculoskeletal:        Left ankle: He exhibits swelling. He exhibits normal range of motion. Tenderness. Lateral malleolus and head of 5th metatarsal tenderness found. Achilles tendon exhibits normal Sierra's test results. Feet:    Neurological: He is alert and oriented to person, place, and time. Coordination normal.   Skin: Skin is warm and dry. No rash noted. He is not diaphoretic. No erythema. No pallor. Nursing note and vitals reviewed. MDM  Number of Diagnoses or Management Options  Sprain of left ankle, unspecified ligament, initial encounter:   STD exposure:   Diagnosis management comments: DDX:  Ankle sprain vs fracture, STD exposure    MDM:  Plan - left ankle xray. Due to known exposure to chlamydia will treat. UA, GC/C ordered.   Progress - patient ha a HX of anaphylaxis with amoxicillin requiring intubation as a child, will treat with 2g Zithromax and 320mg of gemifloxacin   Left ankle xray - Impression:     1. No acute fracture or subluxation. 2.  Periarticular soft tissue ossification vs. loose intra-articular body. 3.  Accelerated osteoarthrosis with rim osteophyte development at the posterior  margin of the distal tibia. Perhaps related to the above-mentioned potential  intra-articular loose body. 4.  Mild joint effusion. 5.  Plantar heel spur. -UA - no evidence of infection. Will placed stirrip and give crutches. Patient referred to orthopedics for further evaluation as needed, if symptoms not improved in 1 week. Patient educated to return to the ED for any new or worsening symptoms. Patient referred to PCP as needed. Questions denied. SPLINT ASSESSMENT:  Left ankle Splint was correctly applied to the stirrup by Herbert Newsome EDT. Splint is in a good position. Pulse, motor and sensation were intact before and after splint was applied.          Amount and/or Complexity of Data Reviewed  Clinical lab tests: ordered and reviewed  Tests in the radiology section of CPT®: ordered and reviewed      ED Course       Procedures             RESULTS:    XR ANKLE LT MIN 3 V   Final Result          Labs Reviewed   URINALYSIS W/ RFLX MICROSCOPIC - Abnormal; Notable for the following:        Result Value    Leukocyte Esterase SMALL (*)     All other components within normal limits   CHLAMYDIA/NEISSERIA AMPLIFICATION   URINE MICROSCOPIC ONLY       Recent Results (from the past 12 hour(s))   URINALYSIS W/ RFLX MICROSCOPIC    Collection Time: 08/07/17  2:10 PM   Result Value Ref Range    Color YELLOW      Appearance CLEAR      Specific gravity 1.026 1.005 - 1.030      pH (UA) 5.5 5.0 - 8.0      Protein NEGATIVE  NEG mg/dL    Glucose NEGATIVE  NEG mg/dL    Ketone NEGATIVE  NEG mg/dL    Bilirubin NEGATIVE  NEG      Blood NEGATIVE  NEG      Urobilinogen 1.0 0.2 - 1.0 EU/dL    Nitrites NEGATIVE  NEG Leukocyte Esterase SMALL (A) NEG     URINE MICROSCOPIC ONLY    Collection Time: 08/07/17  2:10 PM   Result Value Ref Range    WBC 4 to 10 0 - 4 /hpf    RBC NEGATIVE  0 - 5 /hpf    Epithelial cells FEW 0 - 5 /lpf    Bacteria NEGATIVE  NEG /hpf         PROGRESS NOTE:   1:46 PM   Initial assessment completed. Written by Jessica Diez NP-C    DISCHARGE NOTE:  3:33 PM   Deep Guerrero's  results have been reviewed with him. He has been counseled regarding his diagnosis, treatment, and plan. He verbally conveys understanding and agreement of the signs, symptoms, diagnosis, treatment and prognosis and additionally agrees to follow up as discussed. He also agrees with the care-plan and conveys that all of his questions have been answered. I have also provided discharge instructions for him that include: educational information regarding their diagnosis and treatment, and list of reasons why they would want to return to the ED prior to their follow-up appointment, should his condition change. CLINICAL IMPRESSION:    1. Sprain of left ankle, unspecified ligament, initial encounter    2. STD exposure        AFTER VISIT PLAN:    Current Discharge Medication List      START taking these medications    Details   gemifloxacin (FACTIVE) 320 mg tablet Take 320 mg by mouth now for 1 dose. Qty: 1 Tab, Refills: 0      HYDROcodone-acetaminophen (NORCO) 5-325 mg per tablet Take 1 Tab by mouth every six (6) hours as needed for Pain. Max Daily Amount: 4 Tabs. Qty: 10 Tab, Refills: 0         CONTINUE these medications which have CHANGED    Details   ibuprofen (MOTRIN) 600 mg tablet Take 1 Tab by mouth every six (6) hours as needed for Pain.   Qty: 20 Tab, Refills: 0              Follow-up Information     Follow up With Details Comments [address] [facility] Schedule an appointment as soon as possible for a visit in 1 week As needed [address] Hyun Ibanez MD Schedule an appointment as soon as possible for a visit in 1 week As needed [address] [facility]  [phone]             Written by Duran COTO

## 2017-08-07 NOTE — DISCHARGE INSTRUCTIONS
Ankle Sprain: Care Instructions  Your Care Instructions    An ankle sprain can happen when you twist your ankle. The ligaments that support the ankle can get stretched and torn. Often the ankle is swollen and painful. Ankle sprains may take from several weeks to several months to heal. Usually, the more pain and swelling you have, the more severe your ankle sprain is and the longer it will take to heal. You can heal faster and regain strength in your ankle with good home treatment. It is very important to give your ankle time to heal completely, so that you do not easily hurt your ankle again. Follow-up care is a key part of your treatment and safety. Be sure to make and go to all appointments, and call your doctor if you are having problems. It's also a good idea to know your test results and keep a list of the medicines you take. How can you care for yourself at home? · Prop up your foot on pillows as much as possible for the next 3 days. Try to keep your ankle above the level of your heart. This will help reduce the swelling. · Follow your doctor's directions for wearing a splint or elastic bandage. Wrapping the ankle may help reduce or prevent swelling. · Your doctor may give you a splint, a brace, an air stirrup, or another form of ankle support to protect your ankle until it is healed. Wear it as directed while your ankle is healing. Do not remove it unless your doctor tells you to. After your ankle has healed, ask your doctor whether you should wear the brace when you exercise. · Put ice or cold packs on your injured ankle for 10 to 20 minutes at a time. Try to do this every 1 to 2 hours for the next 3 days (when you are awake) or until the swelling goes down. Put a thin cloth between the ice and your skin. · You may need to use crutches until you can walk without pain. If you do use crutches, try to bear some weight on your injured ankle if you can do so without pain.  This helps the ankle heal.  · Take pain medicines exactly as directed. ¨ If the doctor gave you a prescription medicine for pain, take it as prescribed. ¨ If you are not taking a prescription pain medicine, ask your doctor if you can take an over-the-counter medicine. · If you have been given ankle exercises to do at home, do them exactly as instructed. These can promote healing and help prevent lasting weakness. When should you call for help? Call your doctor now or seek immediate medical care if:  · Your pain is getting worse. · Your swelling is getting worse. · Your splint feels too tight or you are unable to loosen it. Watch closely for changes in your health, and be sure to contact your doctor if:  · You are not getting better after 1 week. Where can you learn more? Go to http://selina-amrit.info/. Enter L545 in the search box to learn more about \"Ankle Sprain: Care Instructions. \"  Current as of: March 21, 2017  Content Version: 11.3  © 3872-3204 [employer]. Care instructions adapted under license by [employer] (which disclaims liability or warranty for this information). If you have questions about a medical condition or this instruction, always ask your healthcare professional. [place] [address] any warranty or liability for your use of this information. Learning How to Use a Male Condom  What is a male condom? Condoms can be used to prevent pregnancy. They can also help protect against sexually transmitted infections (STIs). You must use a new condom every time you have sex. Condoms prevent pregnancy by keeping sperm and eggs apart. The condom holds the sperm so the sperm can't get into the vagina. A male condom is a tube of soft rubber or plastic with a closed end. It fits over the penis. There are many kinds of male condoms. Some condoms are lubricated. Some are ribbed. Most have a \"reservoir tip\" for holding the semen.  You can also buy condoms of different sizes. How do you use a condom? Condoms work best if you follow these steps. · Use a new condom each time you have sex. · Check the condom's expiration date. Do not use it past that date. · When opening the condom wrapper, be sure not to poke a hole in the condom with your fingernails, teeth, or other sharp objects. · Put the condom on as soon as the penis is hard (erect) and before any sexual contact with your partner. ¨ First, hold the tip of the condom and squeeze out the air. This leaves room for the semen after you ejaculate. ¨ If you are not circumcised, pull down the loose skin from the head of the penis (foreskin) before you put on the condom. ¨ Hold on to the tip of the condom as you unroll the condom. Unroll it all the way down to the base of the penis. · After you ejaculate, hold on to the condom at the base of the penis, and withdraw from your partner while your penis is still erect. This will keep semen from spilling out of the condom. · Wash your hands after you handle a used condom. How do you buy and store condoms? · Male condoms may be available for free at family planning clinics. You can buy them without a prescription at drugstores, online, and in some grocery stores. · Keep condoms wrapped in their original packages until you are ready to use them. Store them in a cool, dry place out of direct sunlight. · Don't keep rubber (latex) condoms in a glove compartment or other hot places for a long time. Heat weakens latex and increases the chance that the condom will break. · Don't use condoms in damaged packages. And don't use condoms that are brittle, sticky, or discolored, even if they are not past their expiration date. What else do you need to know? · To protect yourself and your partner from STIs, use a condom during vaginal, oral, or anal sex.   · If the condom breaks or you think sperm may have leaked out into the vagina, the woman can use emergency contraception, such as the morning-after pill (Plan B). Emergency contraception can be used for up to 5 days after you have had sex. But it works best if you use it right away. · Use a male condom with another form of birth control. It's the best way to prevent pregnancy. ¨ You can use the condom with hormonal contraception, an intrauterine device (IUD), a diaphragm, a sponge, a shield, or a cervical cap. ¨ Don't use a male condom with a female condom. ¨ Use spermicide as its instructions say. Don't put spermicide inside a condom. · If you or your partner gets a rash or feels itchy after using a latex condom, talk to your doctor. You may have a latex allergy. Where can you learn more? Go to http://selina-amrit.info/. Enter V240 in the search box to learn more about \"Learning How to Use a Male Condom. \"  Current as of: March 16, 2017  Content Version: 11.3  © 2802-6066 [email or web address]. Care instructions adapted under license by [employer] (which disclaims liability or warranty for this information). If you have questions about a medical condition or this instruction, always ask your healthcare professional. [address] any warranty or liability for your use of this information. Message Missile Activation    Thank you for requesting access to Message Missile. Please follow the instructions below to securely access and download your online medical record. Message Missile allows you to send messages to your doctor, view your test results, renew your prescriptions, schedule appointments, and more. How Do I Sign Up? 1. In your internet browser, go to [email or web address]  2. Click on the First Time User? Click Here link in the Sign In box. You will be redirect to the New Member Sign Up page. 3. Enter your Message Missile Access Code exactly as it appears below. You will not need to use this code after youve completed the sign-up process.  If you do not sign up before the expiration date, you must request a new code. Placeling Access Code: Activation code not generated  Current Placeling Status: Active (This is the date your Placeling access code will )    4. Enter the last four digits of your Social Security Number (xxxx) and Date of Birth (mm/dd/yyyy) as indicated and click Submit. You will be taken to the next sign-up page. 5. Create a [email or web address] ID. This will be your Placeling login ID and cannot be changed, so think of one that is secure and easy to remember. 6. Create a Placeling password. You can change your password at any time. 7. Enter your Password Reset Question and Answer. This can be used at a later time if you forget your password. 8. Enter your e-mail address. You will receive e-mail notification when new information is available in [address] 9. Click Sign Up. You can now view and download portions of your medical record. 10. Click the Download Summary menu link to download a portable copy of your medical information. Additional Information    If you have questions, please visit the Frequently Asked Questions section of the Placeling website at [email or web address] Remember, Placeling is NOT to be used for urgent needs. For medical emergencies, dial 911.

## 2017-08-07 NOTE — ED NOTES
Parker Brannon is a 32 y.o. male that was discharged in good condition. The patients diagnosis, condition and treatment were explained to  patient and aftercare instructions were given. The patient verbalized understanding. Patient armband removed and shredded.

## 2017-08-07 NOTE — ED TRIAGE NOTES
Pt c/o left ankle injury yesterday. Also requests to be checked for STD.  Wife states she tested positive for chlamydia yest.

## 2017-08-07 NOTE — LETTER
[facility] EMERGENCY DEPT 
[address] [address] [phone] Work/School Note Date: 8/7/2017 To Whom It May concern: 
 
Rinku Owens was seen and treated today in the emergency room by the following provider(s): 
Attending Provider: Lilli Ahumada, MD 
Nurse Practitioner: Rosa Christopher NP. Rinku Owens may return to work on 08/08/2017. Sincerely, Rosa Christopher NP

## 2017-08-08 LAB
C TRACH RRNA SPEC QL NAA+PROBE: POSITIVE
N GONORRHOEA RRNA SPEC QL NAA+PROBE: NEGATIVE
SPECIMEN SOURCE: ABNORMAL

## 2018-09-22 ENCOUNTER — HOSPITAL ENCOUNTER (EMERGENCY)
Age: 28
Discharge: HOME | End: 2018-09-22
Attending: EMERGENCY MEDICINE
Payer: SELF-PAY

## 2018-09-22 ENCOUNTER — APPOINTMENT (OUTPATIENT)
Dept: CT IMAGING | Age: 28
End: 2018-09-22
Attending: EMERGENCY MEDICINE
Payer: SELF-PAY

## 2018-09-22 ENCOUNTER — APPOINTMENT (OUTPATIENT)
Dept: GENERAL RADIOLOGY | Age: 28
End: 2018-09-22
Attending: EMERGENCY MEDICINE
Payer: SELF-PAY

## 2018-09-22 VITALS
OXYGEN SATURATION: 95 % | BODY MASS INDEX: 37.21 KG/M2 | WEIGHT: 282 LBS | RESPIRATION RATE: 23 BRPM | HEART RATE: 85 BPM | DIASTOLIC BLOOD PRESSURE: 63 MMHG | SYSTOLIC BLOOD PRESSURE: 126 MMHG | TEMPERATURE: 101.4 F

## 2018-09-22 DIAGNOSIS — R10.84 ABDOMINAL PAIN, GENERALIZED: Primary | ICD-10-CM

## 2018-09-22 LAB
ALBUMIN SERPL-MCNC: 3.9 G/DL (ref 3.4–5)
ALBUMIN/GLOB SERPL: 1.1 {RATIO} (ref 0.8–1.7)
ALP SERPL-CCNC: 100 U/L (ref 45–117)
ALT SERPL-CCNC: 81 U/L (ref 16–61)
ANION GAP SERPL CALC-SCNC: 7 MMOL/L (ref 3–18)
APPEARANCE UR: CLEAR
AST SERPL-CCNC: 31 U/L (ref 15–37)
ATRIAL RATE: 89 BPM
BASOPHILS # BLD: 0 K/UL (ref 0–0.1)
BASOPHILS NFR BLD: 0 % (ref 0–2)
BILIRUB SERPL-MCNC: 0.4 MG/DL (ref 0.2–1)
BILIRUB UR QL: NEGATIVE
BUN SERPL-MCNC: 8 MG/DL (ref 7–18)
BUN/CREAT SERPL: 8 (ref 12–20)
CALCIUM SERPL-MCNC: 8.8 MG/DL (ref 8.5–10.1)
CALCULATED P AXIS, ECG09: 26 DEGREES
CALCULATED R AXIS, ECG10: 24 DEGREES
CALCULATED T AXIS, ECG11: 54 DEGREES
CHLORIDE SERPL-SCNC: 103 MMOL/L (ref 100–108)
CO2 SERPL-SCNC: 27 MMOL/L (ref 21–32)
COLOR UR: YELLOW
CREAT SERPL-MCNC: 0.98 MG/DL (ref 0.6–1.3)
DIAGNOSIS, 93000: NORMAL
DIFFERENTIAL METHOD BLD: ABNORMAL
EOSINOPHIL # BLD: 0.1 K/UL (ref 0–0.4)
EOSINOPHIL NFR BLD: 2 % (ref 0–5)
ERYTHROCYTE [DISTWIDTH] IN BLOOD BY AUTOMATED COUNT: 12.4 % (ref 11.6–14.5)
GLOBULIN SER CALC-MCNC: 3.7 G/DL (ref 2–4)
GLUCOSE SERPL-MCNC: 106 MG/DL (ref 74–99)
GLUCOSE UR STRIP.AUTO-MCNC: NEGATIVE MG/DL
HCT VFR BLD AUTO: 43.3 % (ref 36–48)
HGB BLD-MCNC: 15.2 G/DL (ref 13–16)
HGB UR QL STRIP: NEGATIVE
KETONES UR QL STRIP.AUTO: NEGATIVE MG/DL
LACTATE BLD-SCNC: 2.2 MMOL/L (ref 0.4–2)
LEUKOCYTE ESTERASE UR QL STRIP.AUTO: NEGATIVE
LIPASE SERPL-CCNC: 112 U/L (ref 73–393)
LYMPHOCYTES # BLD: 1.1 K/UL (ref 0.9–3.6)
LYMPHOCYTES NFR BLD: 13 % (ref 21–52)
MCH RBC QN AUTO: 31 PG (ref 24–34)
MCHC RBC AUTO-ENTMCNC: 35.1 G/DL (ref 31–37)
MCV RBC AUTO: 88.4 FL (ref 74–97)
MONOCYTES # BLD: 0.6 K/UL (ref 0.05–1.2)
MONOCYTES NFR BLD: 8 % (ref 3–10)
NEUTS SEG # BLD: 6.4 K/UL (ref 1.8–8)
NEUTS SEG NFR BLD: 77 % (ref 40–73)
NITRITE UR QL STRIP.AUTO: NEGATIVE
P-R INTERVAL, ECG05: 156 MS
PH UR STRIP: 5 [PH] (ref 5–8)
PLATELET # BLD AUTO: 266 K/UL (ref 135–420)
PMV BLD AUTO: 9.3 FL (ref 9.2–11.8)
POTASSIUM SERPL-SCNC: 3.6 MMOL/L (ref 3.5–5.5)
PROT SERPL-MCNC: 7.6 G/DL (ref 6.4–8.2)
PROT UR STRIP-MCNC: NEGATIVE MG/DL
Q-T INTERVAL, ECG07: 334 MS
QRS DURATION, ECG06: 92 MS
QTC CALCULATION (BEZET), ECG08: 406 MS
RBC # BLD AUTO: 4.9 M/UL (ref 4.7–5.5)
SODIUM SERPL-SCNC: 137 MMOL/L (ref 136–145)
SP GR UR REFRACTOMETRY: 1.03 (ref 1–1.03)
UROBILINOGEN UR QL STRIP.AUTO: 0.2 EU/DL (ref 0.2–1)
VENTRICULAR RATE, ECG03: 89 BPM
WBC # BLD AUTO: 8.3 K/UL (ref 4.6–13.2)

## 2018-09-22 PROCEDURE — 74011636320 HC RX REV CODE- 636/320: Performed by: EMERGENCY MEDICINE

## 2018-09-22 PROCEDURE — 96365 THER/PROPH/DIAG IV INF INIT: CPT

## 2018-09-22 PROCEDURE — 96375 TX/PRO/DX INJ NEW DRUG ADDON: CPT

## 2018-09-22 PROCEDURE — 71045 X-RAY EXAM CHEST 1 VIEW: CPT

## 2018-09-22 PROCEDURE — 96367 TX/PROPH/DG ADDL SEQ IV INF: CPT

## 2018-09-22 PROCEDURE — 74011250637 HC RX REV CODE- 250/637: Performed by: EMERGENCY MEDICINE

## 2018-09-22 PROCEDURE — 74011000258 HC RX REV CODE- 258: Performed by: EMERGENCY MEDICINE

## 2018-09-22 PROCEDURE — 93005 ELECTROCARDIOGRAM TRACING: CPT

## 2018-09-22 PROCEDURE — 87040 BLOOD CULTURE FOR BACTERIA: CPT | Performed by: EMERGENCY MEDICINE

## 2018-09-22 PROCEDURE — 85025 COMPLETE CBC W/AUTO DIFF WBC: CPT | Performed by: EMERGENCY MEDICINE

## 2018-09-22 PROCEDURE — 87086 URINE CULTURE/COLONY COUNT: CPT | Performed by: EMERGENCY MEDICINE

## 2018-09-22 PROCEDURE — 80053 COMPREHEN METABOLIC PANEL: CPT | Performed by: EMERGENCY MEDICINE

## 2018-09-22 PROCEDURE — 83690 ASSAY OF LIPASE: CPT | Performed by: EMERGENCY MEDICINE

## 2018-09-22 PROCEDURE — 81003 URINALYSIS AUTO W/O SCOPE: CPT | Performed by: EMERGENCY MEDICINE

## 2018-09-22 PROCEDURE — 99285 EMERGENCY DEPT VISIT HI MDM: CPT

## 2018-09-22 PROCEDURE — 74011250636 HC RX REV CODE- 250/636: Performed by: EMERGENCY MEDICINE

## 2018-09-22 PROCEDURE — 74177 CT ABD & PELVIS W/CONTRAST: CPT

## 2018-09-22 PROCEDURE — 83605 ASSAY OF LACTIC ACID: CPT

## 2018-09-22 RX ORDER — MORPHINE SULFATE 4 MG/ML
4 INJECTION, SOLUTION INTRAMUSCULAR; INTRAVENOUS
Status: COMPLETED | OUTPATIENT
Start: 2018-09-22 | End: 2018-09-22

## 2018-09-22 RX ORDER — ACETAMINOPHEN 325 MG/1
650 TABLET ORAL
Qty: 30 TAB | Refills: 0 | Status: SHIPPED | OUTPATIENT
Start: 2018-09-22 | End: 2018-10-25

## 2018-09-22 RX ORDER — CIPROFLOXACIN 500 MG/1
500 TABLET ORAL 2 TIMES DAILY
Qty: 14 TAB | Refills: 0 | Status: SHIPPED | OUTPATIENT
Start: 2018-09-22 | End: 2018-09-29

## 2018-09-22 RX ORDER — LEVOFLOXACIN 5 MG/ML
750 INJECTION, SOLUTION INTRAVENOUS EVERY 24 HOURS
Status: DISCONTINUED | OUTPATIENT
Start: 2018-09-23 | End: 2018-09-22

## 2018-09-22 RX ORDER — ONDANSETRON 4 MG/1
4 TABLET, ORALLY DISINTEGRATING ORAL
Qty: 20 TAB | Refills: 0 | Status: SHIPPED | OUTPATIENT
Start: 2018-09-22 | End: 2018-10-25

## 2018-09-22 RX ORDER — METRONIDAZOLE 500 MG/100ML
500 INJECTION, SOLUTION INTRAVENOUS EVERY 8 HOURS
Status: DISCONTINUED | OUTPATIENT
Start: 2018-09-22 | End: 2018-09-22 | Stop reason: HOSPADM

## 2018-09-22 RX ORDER — DICYCLOMINE HYDROCHLORIDE 20 MG/1
20 TABLET ORAL EVERY 6 HOURS
Qty: 20 TAB | Refills: 0 | Status: SHIPPED | OUTPATIENT
Start: 2018-09-22 | End: 2018-09-27

## 2018-09-22 RX ORDER — ACETAMINOPHEN 325 MG/1
650 TABLET ORAL ONCE
Status: COMPLETED | OUTPATIENT
Start: 2018-09-22 | End: 2018-09-22

## 2018-09-22 RX ORDER — METRONIDAZOLE 500 MG/100ML
500 INJECTION, SOLUTION INTRAVENOUS EVERY 8 HOURS
Status: DISCONTINUED | OUTPATIENT
Start: 2018-09-22 | End: 2018-09-22

## 2018-09-22 RX ORDER — LEVOFLOXACIN 5 MG/ML
750 INJECTION, SOLUTION INTRAVENOUS EVERY 24 HOURS
Status: DISCONTINUED | OUTPATIENT
Start: 2018-09-22 | End: 2018-09-22

## 2018-09-22 RX ORDER — SODIUM CHLORIDE 0.9 % (FLUSH) 0.9 %
5-10 SYRINGE (ML) INJECTION AS NEEDED
Status: DISCONTINUED | OUTPATIENT
Start: 2018-09-22 | End: 2018-09-22 | Stop reason: HOSPADM

## 2018-09-22 RX ORDER — ONDANSETRON 2 MG/ML
4 INJECTION INTRAMUSCULAR; INTRAVENOUS
Status: COMPLETED | OUTPATIENT
Start: 2018-09-22 | End: 2018-09-22

## 2018-09-22 RX ADMIN — METRONIDAZOLE 500 MG: 500 INJECTION, SOLUTION INTRAVENOUS at 12:34

## 2018-09-22 RX ADMIN — MORPHINE SULFATE 4 MG: 4 INJECTION, SOLUTION INTRAMUSCULAR; INTRAVENOUS at 12:45

## 2018-09-22 RX ADMIN — AZTREONAM 2 G: 2 INJECTION, POWDER, LYOPHILIZED, FOR SOLUTION INTRAMUSCULAR; INTRAVENOUS at 11:43

## 2018-09-22 RX ADMIN — ONDANSETRON 4 MG: 2 INJECTION INTRAMUSCULAR; INTRAVENOUS at 11:24

## 2018-09-22 RX ADMIN — SODIUM CHLORIDE 1000 ML: 900 INJECTION, SOLUTION INTRAVENOUS at 11:15

## 2018-09-22 RX ADMIN — IOPAMIDOL 100 ML: 612 INJECTION, SOLUTION INTRAVENOUS at 12:15

## 2018-09-22 RX ADMIN — ACETAMINOPHEN 650 MG: 325 TABLET ORAL at 11:23

## 2018-09-22 NOTE — ED NOTES
\"I\"ll take some pain medication. \" Abdominal pain a 9/10. Dr. Jayleen Burrows updated. No vomiting. A &O x 4.

## 2018-09-22 NOTE — ED PROVIDER NOTES
EMERGENCY DEPARTMENT HISTORY AND PHYSICAL EXAM 
 
11:11 AM 
 
Date: 9/22/2018 Patient Name: Latricia Hansen History of Presenting Illness Chief Complaint Patient presents with  Abdominal Pain Chief Complaint: ab pain History Provided By: Patient Additional History (Context): aLtricia Hansen is a 32 y.o. male with hypertension who presents with ab pain and fever. Sx for the past week- no assoc urinary sx but did have decreased stool frequency. Perhaps with hernia followed by his PMD but no surg and no other surgeries. Some assoc mild sore throat after throwing up and muscle aches through out. No testicular tenderness or swelling. 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 PCP: None Current Facility-Administered Medications Medication Dose Route Frequency Provider Last Rate Last Dose  sodium chloride (NS) flush 5-10 mL  5-10 mL IntraVENous PRN Samantha Segundo MD      
 sodium chloride 0.9 % bolus infusion 1,000 mL  1,000 mL IntraVENous ONCE Samantha Segundo MD      
 ondansetron [facility]) injection 4 mg  4 mg IntraVENous NOW Samantha Segundo MD      
 acetaminophen (TYLENOL) tablet 650 mg  650 mg Oral ONCE Samantha Segundo MD      
 
Current Outpatient Prescriptions Medication Sig Dispense Refill  
 HYDROcodone-acetaminophen (NORCO) 5-325 mg per tablet Take 1 Tab by mouth every six (6) hours as needed for Pain. Max Daily Amount: 4 Tabs. 10 Tab 0  ibuprofen (MOTRIN) 600 mg tablet Take 1 Tab by mouth every six (6) hours as needed for Pain. 20 Tab 0  
 albuterol (PROVENTIL HFA, VENTOLIN HFA, PROAIR HFA) 90 mcg/actuation inhaler Take 2 Puffs by inhalation every four (4) hours as needed for Wheezing. 1 Inhaler 0  
 ondansetron (ZOFRAN ODT) 4 mg disintegrating tablet Take 1 Tab by mouth every eight (8) hours as needed for Nausea.  8 Tab 0  
 loratadine (CLARITIN) 10 mg tablet Take 1 tab by mouth daily for seven (7) days. Continue after 7 days only if symptoms are still present. Restart for 7 day intervals if sinus congestion symptoms return. 30 Tab 0  
 triamcinolone (NASACORT AQ) 55 mcg nasal inhaler Apply 2 sprays to each nostril BID for 7 days and then as needed after that. 16.5 g 0  
 guaiFENesin-codeine (ROBITUSSIN AC) 100-10 mg/5 mL solution Take 5 mL by mouth three (3) times daily as needed for Cough. Max Daily Amount: 15 mL. 120 mL 0 Duration:  Days Timing:  Gradual 
Location: ab 
Quality: Dull Severity: Moderate Modifying Factors: none Associated Symptoms: decreased stool Past History Past Medical History: 
Past Medical History:  
Diagnosis Date  Back injury  Back problem  Depression  Fractures 3 slip disk;   
 History of ear infections  Hypertension  Suicidal ideation Past Surgical History: 
Past Surgical History:  
Procedure Laterality Date  HX TYMPANOSTOMY Family History: 
Family History Problem Relation Age of Onset  Cancer Father   
  mesothelioma Social History: 
Social History Substance Use Topics  Smoking status: Current Every Day Smoker Packs/day: 1.00 Years: 12.00  Smokeless tobacco: Never Used  Alcohol use 4.0 oz/week 8 Cans of beer per week Comment: social  
 
 
Allergies: Allergies Allergen Reactions  Amoxicillin Swelling Review of Systems Review of Systems HENT: Positive for sore throat. Gastrointestinal: Positive for abdominal pain, constipation, nausea and vomiting. Negative for diarrhea. All other systems reviewed and are negative. Physical Exam  
 
Patient Vitals for the past 12 hrs: 
 Temp Pulse Resp BP SpO2  
09/22/18 1300 - 81 21 122/75 96 %  
09/22/18 1200 - 90 24 132/77 97 % 09/22/18 1145 - 89 27 137/75 96 %  
09/22/18 1130 - 91 21 135/72 97 % 09/22/18 1115 - 95 26 143/85 97 % 09/22/18 1100 - - - - 98 % 09/22/18 1052 (!) 101.4 °F (38.6 °C) (!) 105 17 (!) 165/102 98 % Physical Exam  
Constitutional: He is oriented to person, place, and time. He appears well-developed. HENT:  
Head: Normocephalic and atraumatic. Eyes: Conjunctivae and EOM are normal.  
Neck: Normal range of motion. Cardiovascular: Normal heart sounds. Exam reveals no gallop and no friction rub. No murmur heard. Pulmonary/Chest: Effort normal and breath sounds normal. No stridor. Abdominal: Soft. He exhibits no distension. There is tenderness. Mild suprapubic pain, radiation to bilateral flanks Genitourinary: Penis normal.  
Genitourinary Comments: No testicular tenderness or swelling, no lesions or rashes, no hernias felt Musculoskeletal: Normal range of motion. He exhibits no tenderness. Neurological: He is alert and oriented to person, place, and time. Skin: Skin is warm and dry. He is not diaphoretic. Psychiatric: He has a normal mood and affect. His behavior is normal.  
Nursing note and vitals reviewed. Diagnostic Study Results Labs - Recent Results (from the past 12 hour(s)) METABOLIC PANEL, COMPREHENSIVE Collection Time: 09/22/18 11:00 AM  
Result Value Ref Range Sodium 137 136 - 145 mmol/L Potassium 3.6 3.5 - 5.5 mmol/L Chloride 103 100 - 108 mmol/L  
 CO2 27 21 - 32 mmol/L Anion gap 7 3.0 - 18 mmol/L Glucose 106 (H) 74 - 99 mg/dL BUN 8 7.0 - 18 MG/DL Creatinine 0.98 0.6 - 1.3 MG/DL  
 BUN/Creatinine ratio 8 (L) 12 - 20 GFR est AA >60 >60 ml/min/1.73m2 GFR est non-AA >60 >60 ml/min/1.73m2 Calcium 8.8 8.5 - 10.1 MG/DL Bilirubin, total 0.4 0.2 - 1.0 MG/DL  
 ALT (SGPT) 81 (H) 16 - 61 U/L  
 AST (SGOT) 31 15 - 37 U/L Alk. phosphatase 100 45 - 117 U/L Protein, total 7.6 6.4 - 8.2 g/dL Albumin 3.9 3.4 - 5.0 g/dL Globulin 3.7 2.0 - 4.0 g/dL A-G Ratio 1.1 0.8 - 1.7    
CBC WITH AUTOMATED DIFF  Collection Time: 09/22/18 11:00 AM  
 Result Value Ref Range WBC 8.3 4.6 - 13.2 K/uL  
 RBC 4.90 4.70 - 5.50 M/uL  
 HGB 15.2 13.0 - 16.0 g/dL HCT 43.3 36.0 - 48.0 % MCV 88.4 74.0 - 97.0 FL  
 MCH 31.0 24.0 - 34.0 PG  
 MCHC 35.1 31.0 - 37.0 g/dL  
 RDW 12.4 11.6 - 14.5 % PLATELET 250 487 - 351 K/uL MPV 9.3 9.2 - 11.8 FL  
 NEUTROPHILS 77 (H) 40 - 73 % LYMPHOCYTES 13 (L) 21 - 52 % MONOCYTES 8 3 - 10 % EOSINOPHILS 2 0 - 5 % BASOPHILS 0 0 - 2 %  
 ABS. NEUTROPHILS 6.4 1.8 - 8.0 K/UL  
 ABS. LYMPHOCYTES 1.1 0.9 - 3.6 K/UL  
 ABS. MONOCYTES 0.6 0.05 - 1.2 K/UL  
 ABS. EOSINOPHILS 0.1 0.0 - 0.4 K/UL  
 ABS. BASOPHILS 0.0 0.0 - 0.1 K/UL  
 DF AUTOMATED    
LIPASE Collection Time: 09/22/18 11:00 AM  
Result Value Ref Range Lipase 112 73 - 393 U/L  
POC LACTIC ACID Collection Time: 09/22/18 11:09 AM  
Result Value Ref Range Lactic Acid (POC) 2.2 (HH) 0.4 - 2.0 mmol/L  
EKG, 12 LEAD, INITIAL Collection Time: 09/22/18 11:27 AM  
Result Value Ref Range Ventricular Rate 89 BPM  
 Atrial Rate 89 BPM  
 P-R Interval 156 ms QRS Duration 92 ms Q-T Interval 334 ms QTC Calculation (Bezet) 406 ms Calculated P Axis 26 degrees Calculated R Axis 24 degrees Calculated T Axis 54 degrees Diagnosis Normal sinus rhythm Normal ECG When compared with ECG of 02-JUN-2017 23:57, No significant change was found URINALYSIS W/ RFLX MICROSCOPIC Collection Time: 09/22/18 12:49 PM  
Result Value Ref Range Color YELLOW Appearance CLEAR Specific gravity 1.026 1.005 - 1.030    
 pH (UA) 5.0 5.0 - 8.0 Protein NEGATIVE  NEG mg/dL Glucose NEGATIVE  NEG mg/dL Ketone NEGATIVE  NEG mg/dL Bilirubin NEGATIVE  NEG Blood NEGATIVE  NEG Urobilinogen 0.2 0.2 - 1.0 EU/dL Nitrites NEGATIVE  NEG Leukocyte Esterase NEGATIVE  NEG Radiologic Studies -  
XR CHEST PORT    (Results Pending) CT ABD PELV W CONT    (Results Pending) IMPRESSION: No CT evidence for acute process in the abdomen or pelvis. Hepatic steatosis. 
  
  Narrative:  
  INDICATION: Abdominal pain, vomiting COMPARISON: 7/15. TECHNIQUE:  
Multislice helical CT was performed from the diaphragm to the symphysis pubis 
during uneventful rapid bolus intravenous administration of 100 cc Isovue-300. Contiguous 5 mm axial images were reconstructed and lung and soft tissue windows 
were generated. Coronal reformations were generated. All CT scans at this 
facility are performed using doze optimization technique as appropriate to a 
performed exam, to include automated exposure control, adjustment of the mA 
and/or KV according to patient size (including appropriate matching for site 
specific examinations), or use of iterative reconstruction technique. FINDINGS: 
The visualized portions of the lung bases demonstrates no confluent 
consolidation. Marked hepatic steatosis. No splenic mass. No adrenal nodule. No pancreatic 
ductal dilatation. No hydronephrosis or nephrolithiasis. Nonaneurysmal aorta. No retroperitoneal adenopathy. No bowel obstruction. Colonic diverticulosis. Bladder is negative. No acute displaced fracture. IMPRESSION: No acute cardiopulmonary process.  
  Narrative:  
  INDICATION:  Abdominal pain. COMPARISON:  6/3/2017 FINDINGS: A portable AP radiograph of the chest was obtained at 1113 hours. The 
patient is on a cardiac monitor.  The lungs are clear.  The cardiac and 
mediastinal contours and pulmonary vascularity are normal. No acute osseous 
abnormality. Medical Decision Making ED Course: Progress Notes, Reevaluation, and Consults: Provider Notes (Medical Decision Making): Patient presents to the emergency department with acute generalized abdominal pain more so in the lower quadrants.  After history, physical exam, and diagnostic evaluation, the etiology for the pain is unclear. There is no testicular or hernia issues today and on exam. In the emergency department patient received  iv fluids, morphine and Zofran intravenously. Laboratory data was nondiagnostic. Lipase was normal. White blood cell count was unremarkable. On serial exam the pain is improved. There is no Michelles sign or tenderness at Mcburneys point. There is no guarding or rebound. At this point, the etiology of the pt's pain is unclear, but I think they are at low risk for significant abdominal pathology based on serial exams and our ED evaluation. Patient is advised to follow-up with their primary care physician tomorrow for a recheck and repeat abdominal exam or back here within 48 hours for re eval. They were advised to return to the emergency department sooner if significant pain, fevers, not tolerating oral food or fluid, or new complaints. Asked the patient if there were any questions or concerns about the care and discharge instructions. The patient seems satisfied with the care and appears to understand the discharge instructions. Vital Signs-Reviewed the patient's vital signs. Pulse Oximetry Analysis -  96% on room air (Interpretation) normal 
 
EKG: Interpreted by the EP. Time Interpreted: 9709 Rate: 89 Rhythm: Normal Sinus Rhythm Interpretation: Nl STT, QTc 406 Comparison: none Records Reviewed: Nursing Notes (Time of Review: 11:11 AM) 
-I am the first provider for this patient. 
-I reviewed the vital signs, available nursing notes, past medical history, past surgical history, family history and social history. Diagnosis Clinical Impression: 1. Abdominal pain, generalized Disposition: DC Follow-up Information None Patient's Medications Start Taking No medications on file Continue Taking  ALBUTEROL (PROVENTIL HFA, VENTOLIN HFA, PROAIR HFA) 90 MCG/ACTUATION INHALER    Take 2 Puffs by inhalation every four (4) hours as needed for Wheezing. GUAIFENESIN-CODEINE (ROBITUSSIN AC) 100-10 MG/5 ML SOLUTION    Take 5 mL by mouth three (3) times daily as needed for Cough. Max Daily Amount: 15 mL. HYDROCODONE-ACETAMINOPHEN (NORCO) 5-325 MG PER TABLET    Take 1 Tab by mouth every six (6) hours as needed for Pain. Max Daily Amount: 4 Tabs. IBUPROFEN (MOTRIN) 600 MG TABLET    Take 1 Tab by mouth every six (6) hours as needed for Pain. LORATADINE (CLARITIN) 10 MG TABLET    Take 1 tab by mouth daily for seven (7) days. Continue after 7 days only if symptoms are still present. Restart for 7 day intervals if sinus congestion symptoms return. ONDANSETRON (ZOFRAN ODT) 4 MG DISINTEGRATING TABLET    Take 1 Tab by mouth every eight (8) hours as needed for Nausea. TRIAMCINOLONE (NASACORT AQ) 55 MCG NASAL INHALER    Apply 2 sprays to each nostril BID for 7 days and then as needed after that. These Medications have changed No medications on file Stop Taking No medications on file  
 
_______________________________ Attestations: 
Scribe Attestation Phani Varela acting as a scribe for and in the presence of Daly Lewis MD     
September 22, 2018 at 11:11 AM 
    
Provider Attestation:     
I personally performed the services described in the documentation, reviewed the documentation, as recorded by the scribe in my presence, and it accurately and completely records my words and actions. September 22, 2018 at 11:11 AM - Daly Lewis MD   
_______________________________

## 2018-09-22 NOTE — ED TRIAGE NOTES
Patient complains of abd pain , patient states that he was vomiting last week x 3 days. Patient states that he has generalized body aches.

## 2018-09-22 NOTE — Clinical Note
Please follow up with a doctor as discussed. You presented for fever and abdominal pain but your tests today are reassuring. There still could be an more serious issue right now so please return to the ER in 48 hours for  Recheck if you do not feel loc r. The evaluation and treatment done today requires that you follow up with a physician for re-evaluation. Medical problems can change over time and symptoms can get worse or new symptoms can develop over time, therefore, it is important that you follow  up as we discussed. Please immediately return to the ER if you have any concerns. Call the ER if you have any questions about what we discussed. Return to the ED if the pain gets worse in your abdomen, vomiting, fever or anything that concerns you. At the [facility] Emergency Department we are genuinely concerned about your health and comfort. You may be selected to participate in a patient satisfaction survey mailed to your home. We are excited about the opportunity to learn from  your experience so we may continue to improve. In striving for the very best we believe good is not good enough, but if you rate us as EXCELLENT in all boxes, we have succeeded. We strive to provide EXCELLENT care to you and your family. We appreciate th e opportunity to take care of you, and hope you do well.

## 2018-09-23 LAB
BACTERIA SPEC CULT: NORMAL
SERVICE CMNT-IMP: NORMAL

## 2018-09-28 LAB
BACTERIA SPEC CULT: NORMAL
BACTERIA SPEC CULT: NORMAL
SERVICE CMNT-IMP: NORMAL
SERVICE CMNT-IMP: NORMAL

## 2018-10-25 ENCOUNTER — OFFICE VISIT (OUTPATIENT)
Dept: FAMILY MEDICINE CLINIC | Age: 28
End: 2018-10-25

## 2018-10-25 ENCOUNTER — HOSPITAL ENCOUNTER (OUTPATIENT)
Dept: LAB | Age: 28
Discharge: HOME | End: 2018-10-25

## 2018-10-25 VITALS
SYSTOLIC BLOOD PRESSURE: 142 MMHG | HEART RATE: 73 BPM | WEIGHT: 284.6 LBS | BODY MASS INDEX: 37.72 KG/M2 | TEMPERATURE: 97.7 F | HEIGHT: 73 IN | DIASTOLIC BLOOD PRESSURE: 101 MMHG | RESPIRATION RATE: 18 BRPM | OXYGEN SATURATION: 96 %

## 2018-10-25 DIAGNOSIS — Z76.89 ENCOUNTER TO ESTABLISH CARE WITH NEW DOCTOR: Primary | ICD-10-CM

## 2018-10-25 DIAGNOSIS — Z28.21 INFLUENZA VACCINATION DECLINED BY PATIENT: ICD-10-CM

## 2018-10-25 DIAGNOSIS — F90.9 ATTENTION DEFICIT HYPERACTIVITY DISORDER (ADHD), UNSPECIFIED ADHD TYPE: ICD-10-CM

## 2018-10-25 DIAGNOSIS — E66.09 CLASS 2 OBESITY DUE TO EXCESS CALORIES WITHOUT SERIOUS COMORBIDITY WITH BODY MASS INDEX (BMI) OF 37.0 TO 37.9 IN ADULT: ICD-10-CM

## 2018-10-25 DIAGNOSIS — G89.29 CHRONIC LOW BACK PAIN, UNSPECIFIED BACK PAIN LATERALITY, WITH SCIATICA PRESENCE UNSPECIFIED: ICD-10-CM

## 2018-10-25 DIAGNOSIS — Z72.0 TOBACCO ABUSE DISORDER: ICD-10-CM

## 2018-10-25 DIAGNOSIS — Z76.89 ENCOUNTER TO ESTABLISH CARE WITH NEW DOCTOR: ICD-10-CM

## 2018-10-25 DIAGNOSIS — I10 ESSENTIAL HYPERTENSION: ICD-10-CM

## 2018-10-25 DIAGNOSIS — M54.5 CHRONIC LOW BACK PAIN, UNSPECIFIED BACK PAIN LATERALITY, WITH SCIATICA PRESENCE UNSPECIFIED: ICD-10-CM

## 2018-10-25 DIAGNOSIS — F12.90 MARIJUANA USE: ICD-10-CM

## 2018-10-25 DIAGNOSIS — K76.0 FATTY LIVER: ICD-10-CM

## 2018-10-25 DIAGNOSIS — H66.005 RECURRENT ACUTE SUPPURATIVE OTITIS MEDIA WITHOUT SPONTANEOUS RUPTURE OF LEFT TYMPANIC MEMBRANE: ICD-10-CM

## 2018-10-25 LAB
ALBUMIN SERPL-MCNC: 4 G/DL (ref 3.4–5)
ALBUMIN/GLOB SERPL: 1.2 {RATIO} (ref 0.8–1.7)
ALP SERPL-CCNC: 86 U/L (ref 45–117)
ALT SERPL-CCNC: 87 U/L (ref 16–61)
AMPHET UR QL SCN: NEGATIVE
ANION GAP SERPL CALC-SCNC: 8 MMOL/L (ref 3–18)
AST SERPL-CCNC: 31 U/L (ref 15–37)
BARBITURATES UR QL SCN: NEGATIVE
BASOPHILS # BLD: 0 K/UL (ref 0–0.1)
BASOPHILS NFR BLD: 0 % (ref 0–2)
BENZODIAZ UR QL: NEGATIVE
BILIRUB SERPL-MCNC: 0.3 MG/DL (ref 0.2–1)
BUN SERPL-MCNC: 15 MG/DL (ref 7–18)
BUN/CREAT SERPL: 17 (ref 12–20)
CALCIUM SERPL-MCNC: 9 MG/DL (ref 8.5–10.1)
CANNABINOIDS UR QL SCN: POSITIVE
CHLORIDE SERPL-SCNC: 108 MMOL/L (ref 100–108)
CHOLEST SERPL-MCNC: 179 MG/DL
CO2 SERPL-SCNC: 25 MMOL/L (ref 21–32)
COCAINE UR QL SCN: POSITIVE
CREAT SERPL-MCNC: 0.86 MG/DL (ref 0.6–1.3)
DIFFERENTIAL METHOD BLD: ABNORMAL
EOSINOPHIL # BLD: 0.3 K/UL (ref 0–0.4)
EOSINOPHIL NFR BLD: 4 % (ref 0–5)
ERYTHROCYTE [DISTWIDTH] IN BLOOD BY AUTOMATED COUNT: 13 % (ref 11.6–14.5)
EST. AVERAGE GLUCOSE BLD GHB EST-MCNC: 103 MG/DL
ETHANOL SERPL-MCNC: <3 MG/DL (ref 0–3)
GLOBULIN SER CALC-MCNC: 3.4 G/DL (ref 2–4)
GLUCOSE SERPL-MCNC: 92 MG/DL (ref 74–99)
HBA1C MFR BLD: 5.2 % (ref 4.2–5.6)
HCT VFR BLD AUTO: 47.4 % (ref 36–48)
HDLC SERPL-MCNC: 32 MG/DL (ref 40–60)
HDLC SERPL: 5.6 {RATIO} (ref 0–5)
HDSCOM,HDSCOM: ABNORMAL
HGB BLD-MCNC: 16.1 G/DL (ref 13–16)
LDLC SERPL CALC-MCNC: 67 MG/DL (ref 0–100)
LIPID PROFILE,FLP: ABNORMAL
LYMPHOCYTES # BLD: 2.3 K/UL (ref 0.9–3.6)
LYMPHOCYTES NFR BLD: 34 % (ref 21–52)
MCH RBC QN AUTO: 30.9 PG (ref 24–34)
MCHC RBC AUTO-ENTMCNC: 34 G/DL (ref 31–37)
MCV RBC AUTO: 91 FL (ref 74–97)
METHADONE UR QL: NEGATIVE
MONOCYTES # BLD: 0.8 K/UL (ref 0.05–1.2)
MONOCYTES NFR BLD: 12 % (ref 3–10)
NEUTS SEG # BLD: 3.4 K/UL (ref 1.8–8)
NEUTS SEG NFR BLD: 50 % (ref 40–73)
OPIATES UR QL: NEGATIVE
PCP UR QL: NEGATIVE
PLATELET # BLD AUTO: 290 K/UL (ref 135–420)
PMV BLD AUTO: 9.9 FL (ref 9.2–11.8)
POTASSIUM SERPL-SCNC: 4.3 MMOL/L (ref 3.5–5.5)
PROT SERPL-MCNC: 7.4 G/DL (ref 6.4–8.2)
RBC # BLD AUTO: 5.21 M/UL (ref 4.7–5.5)
SODIUM SERPL-SCNC: 141 MMOL/L (ref 136–145)
TRIGL SERPL-MCNC: 400 MG/DL (ref ?–150)
TSH SERPL DL<=0.05 MIU/L-ACNC: 1.79 UIU/ML (ref 0.36–3.74)
VLDLC SERPL CALC-MCNC: 80 MG/DL
WBC # BLD AUTO: 6.8 K/UL (ref 4.6–13.2)

## 2018-10-25 PROCEDURE — 80074 ACUTE HEPATITIS PANEL: CPT | Performed by: NURSE PRACTITIONER

## 2018-10-25 PROCEDURE — 83036 HEMOGLOBIN GLYCOSYLATED A1C: CPT | Performed by: NURSE PRACTITIONER

## 2018-10-25 PROCEDURE — 85025 COMPLETE CBC W/AUTO DIFF WBC: CPT | Performed by: NURSE PRACTITIONER

## 2018-10-25 PROCEDURE — 80307 DRUG TEST PRSMV CHEM ANLYZR: CPT | Performed by: NURSE PRACTITIONER

## 2018-10-25 PROCEDURE — 87389 HIV-1 AG W/HIV-1&-2 AB AG IA: CPT | Performed by: NURSE PRACTITIONER

## 2018-10-25 PROCEDURE — 80053 COMPREHEN METABOLIC PANEL: CPT | Performed by: NURSE PRACTITIONER

## 2018-10-25 PROCEDURE — 84443 ASSAY THYROID STIM HORMONE: CPT | Performed by: NURSE PRACTITIONER

## 2018-10-25 PROCEDURE — 80061 LIPID PANEL: CPT | Performed by: NURSE PRACTITIONER

## 2018-10-25 RX ORDER — ALBUTEROL SULFATE 90 UG/1
1-2 AEROSOL, METERED RESPIRATORY (INHALATION)
Qty: 1 INHALER | Refills: 0 | Status: SHIPPED | COMMUNITY
Start: 2018-10-25 | End: 2022-08-01

## 2018-10-25 RX ORDER — DEXTROAMPHETAMINE SACCHARATE, AMPHETAMINE ASPARTATE MONOHYDRATE, DEXTROAMPHETAMINE SULFATE AND AMPHETAMINE SULFATE 7.5; 7.5; 7.5; 7.5 MG/1; MG/1; MG/1; MG/1
30 CAPSULE, EXTENDED RELEASE ORAL DAILY
Qty: 30 CAP | Refills: 0 | Status: SHIPPED | OUTPATIENT
Start: 2018-11-04 | End: 2018-11-21 | Stop reason: DRUGHIGH

## 2018-10-25 RX ORDER — LISINOPRIL 20 MG/1
20 TABLET ORAL DAILY
Qty: 30 TAB | Refills: 0 | Status: SHIPPED | OUTPATIENT
Start: 2018-10-25 | End: 2019-04-19

## 2018-10-25 RX ORDER — LEVOFLOXACIN 750 MG/1
750 TABLET ORAL DAILY
Qty: 7 TAB | Refills: 0 | Status: SHIPPED | OUTPATIENT
Start: 2018-10-25 | End: 2018-11-01

## 2018-10-25 RX ORDER — DEXTROAMPHETAMINE SACCHARATE, AMPHETAMINE ASPARTATE MONOHYDRATE, DEXTROAMPHETAMINE SULFATE AND AMPHETAMINE SULFATE 7.5; 7.5; 7.5; 7.5 MG/1; MG/1; MG/1; MG/1
30 CAPSULE, EXTENDED RELEASE ORAL DAILY
COMMUNITY
End: 2018-10-25 | Stop reason: SDUPTHER

## 2018-10-25 NOTE — PROGRESS NOTES
BRITTANY ENRIQUEZ [facility]  [address] [place] [facility] [address] [facility]  [phone] office/[phone] fax      10/25/2018    Reason for visit:   Chief Complaint   Patient presents with   [address] Fatty Liver    Hypertension    Attention Deficit Disorder    LOW BACK PAIN       Patient: Yohana Barclay, 1990, [id number]       Primary MD: Jeovanny Jose NP    Subjective:   Yohana Barclay, a 32 y.o. male, with pmhx of ADHD, HTN, Chronic back pain, and Fatty liver disease. Pt with h/o HTN. Previously prescribed Lisinopril. However the patient mentions he never picked up prescription and has not been taking medication for HTN. He does report recent wt gain of about 50 pounds over the past year. He is sedentary and does not exercise. He reports he does not eat fried fatty or salty foods, but rather does not manage his consumption of the amount of foods. He does have a H/O ADHD and is prescribed Adderral XR 30 mg daily, however he mentions that the medication effects wears off mid-day and he is adding an extra dose without provider supervision. He also mentions trying his friends Ritalin without relief. Pt has been on ADHD treatment for about 8 months and was prescribed by previous PCP. He has been gradually titrating medication up. He reports difficulty concentrating and staying on task at work. He is not currently followed by psychiatry. He is using marijuana via smoking/edibles to help with symptoms. Hypertension    The history is provided by the patient. This is a chronic problem. The problem has been gradually worsening. Associated symptoms include palpitations and headaches. Pertinent negatives include no chest pain, no orthopnea, no PND, no anxiety, no confusion, no malaise/fatigue, no blurred vision, no tinnitus, no neck pain, no peripheral edema, no dizziness, no shortness of breath, no nausea and no vomiting.  Agents associated with hypertension include amphetamines (Pt is on Adderral and self increasing dose). Risk factors include smoking/tobacco exposure, male gender, obesity, salty diet and stress. Attention Deficit Disorder   The history is provided by the patient. This is a chronic problem. The current episode started more than 1 week ago. The problem occurs daily. The problem has not changed since onset. Associated symptoms include headaches. Pertinent negatives include no chest pain, no abdominal pain and no shortness of breath. The symptoms are relieved by medications (Marijuana ). Back Pain    The history is provided by the patient. This is a chronic problem. The current episode started more than 1 week ago. The problem has not changed since onset. The problem occurs daily. The pain is associated with a remote injury. The pain is present in the lumbar spine. The quality of the pain is described as shooting, aching and similar to previous episodes. The pain radiates to the right thigh and left thigh. The pain is at a severity of 2/10. The symptoms are aggravated by certain positions and bending. Associated symptoms include headaches. Pertinent negatives include no chest pain, no fever, no numbness, no weight loss, no abdominal pain, no abdominal swelling, no bowel incontinence, no perianal numbness, no bladder incontinence, no dysuria, no pelvic pain, no leg pain, no paresthesias, no paresis, no tingling and no weakness. He has tried NSAIDs (PT and steroid injections) for the symptoms. The treatment provided moderate (Reports steroids improved symptoms for 1 month, otherwise other treatments ineffective and pt does not like taking medications. ) relief. Risk factors include a sedentary lifestyle, lack of exercise and obesity.  The patient's surgical history includes epidural.        PHQ over the last two weeks 10/25/2018   PHQ Not Done Active Diagnosis of Depression or Bipolar Disorder       Past Medical History:   Diagnosis Date    ADD (attention deficit disorder)  Back injury     Back problem     Bipolar affective disorder ([facility] [address]     Depression     Fatty liver     Fractures     3 slip disk;     History of ear infections     Hypertension     Suicidal ideation        Past Surgical History:   Procedure Laterality Date    HX TYMPANOSTOMY         Social History     Socioeconomic History    Marital status: SINGLE     Spouse name: Not on file    Number of children: 4    Years of education: 5    Highest education level: Not on file   Social Needs    Financial resource strain: Hard    Food insecurity - worry: Sometimes true    Food insecurity - inability: Never true   [employer] needs - medical: No    Transportation needs - non-medical: No   Occupational History    Occupation: Unemployed   Tobacco Use    Smoking status: Current Every Day Smoker     Packs/day: 1.00     Years: 12.00     Pack years: 12.00    Smokeless tobacco: Never Used   Substance and Sexual Activity    Alcohol use: Yes     Alcohol/week: 4.0 oz     Types: 8 Cans of beer per week     Frequency: Monthly or less     Drinks per session: 5 or 6     Binge frequency: Less than monthly     Comment: social    Drug use: Yes     Types: Marijuana    Sexual activity: Yes     Partners: Female     Birth control/protection: Condom   Other Topics Concern     Service No    Blood Transfusions No    Caffeine Concern No    Occupational Exposure Yes    Hobby Hazards Yes     Comment: motorcycle    Sleep Concern Yes    Stress Concern Yes    Weight Concern Yes    Special Diet No    Back Care Yes     Comment: \"I have 2 slipped disc and herniated disc\"    Exercise Yes    Bike Helmet Yes    Seat Belt No    Self-Exams Yes   Social History Narrative    Patient lives with grandmother  mother, and little brother. Allergies   Allergen Reactions    Amoxicillin Swelling       No current outpatient medications on file prior to visit.      No current facility-administered medications on file prior to visit. Review of Systems   Constitutional: Negative for fever, malaise/fatigue and weight loss. HENT: Positive for ear pain and hearing loss (Left ear). Negative for congestion, ear discharge, nosebleeds, sinus pain, sore throat and tinnitus. Reports reduced hearing in Left ear with h/o recurrent ear infections and tubes    Eyes: Negative for blurred vision. Respiratory: Negative for shortness of breath and stridor. Cardiovascular: Positive for palpitations. Negative for chest pain, orthopnea and PND. Gastrointestinal: Negative for abdominal pain, bowel incontinence, nausea and vomiting. Genitourinary: Negative for bladder incontinence, dysuria and pelvic pain. Musculoskeletal: Positive for back pain. Negative for neck pain. Neurological: Positive for headaches. Negative for dizziness, tingling, weakness, numbness and paresthesias. Psychiatric/Behavioral: Negative for confusion. Objective:   Visit Vitals  BP (!) 142/101 (BP 1 Location: Left arm, BP Patient Position: Sitting)   Pulse 73   Temp 97.7 °F (36.5 °C) (Oral)   Resp 18   Ht 6' 1\" (1.854 m)   Wt 284 lb 9.6 oz (129.1 kg)   SpO2 96%   BMI 37.55 kg/m²      Wt Readings from Last 3 Encounters:   10/25/18 284 lb 9.6 oz (129.1 kg)   09/22/18 282 lb (127.9 kg)   08/07/17 250 lb (113.4 kg)     Lab Results   Component Value Date/Time    Glucose 106 (H) 09/22/2018 11:00 AM       Physical Exam   Constitutional: He is oriented to person, place, and time. He appears well-developed and well-nourished. HENT:   Head: Normocephalic. Right Ear: Hearing and external ear normal. Tympanic membrane is scarred. Left Ear: No drainage, swelling or tenderness. Tympanic membrane is scarred. A middle ear effusion is present. Decreased hearing is noted. Dull TM    Eyes: Pupils are equal, round, and reactive to light. Neck: Normal range of motion. Neck supple. No JVD present. Carotid bruit is not present. No thyromegaly present. Cardiovascular: Normal rate and regular rhythm. No murmur heard. Pulmonary/Chest: Effort normal and breath sounds normal. No respiratory distress. He has no wheezes. Abdominal: Soft. Bowel sounds are normal. He exhibits no distension. There is no tenderness. Musculoskeletal: Normal range of motion. He exhibits no edema or deformity. Lumbar back: He exhibits tenderness (Muscular tenderness) and bony tenderness. Neurological: He is alert and oriented to person, place, and time. He has normal reflexes. Skin: Skin is warm and dry. Psychiatric: He has a normal mood and affect. His behavior is normal. Judgment and thought content normal.   Vitals reviewed. 9/22/2018  2:50 PM - Ahmet, Card Result In     Component Value Ref Range & Units Status   Ventricular Rate 89  BPM Final   Atrial Rate 89  BPM Final   P-R Interval 156  ms Final   QRS Duration 92  ms Final   Q-T Interval 334  ms Final   QTC Calculation (Bezet) 406  ms Final   Calculated P Axis 26  degrees Final   Calculated R Axis 24  degrees Final   Calculated T Axis 54  degrees Final   Diagnosis   Final   Normal sinus rhythm   Normal ECG   When compared with ECG of 02-JUN-2017 23:57,   No significant change was found   Confirmed by Chele Graham ([id number]) on 9/22/2018 2:50:44 PM        Result Information     Status: Final result (Exam End: 9/22/2018 12:37) Provider Status: Open   Study Result     INDICATION: Abdominal pain, vomiting     COMPARISON: 7/15.     TECHNIQUE:   Multislice helical CT was performed from the diaphragm to the symphysis pubis  during uneventful rapid bolus intravenous administration of 100 cc Isovue-300. Contiguous 5 mm axial images were reconstructed and lung and soft tissue windows  were generated. Coronal reformations were generated.  All CT scans at this  facility are performed using doze optimization technique as appropriate to a  performed exam, to include automated exposure control, adjustment of the mA  and/or KV according to patient size (including appropriate matching for site  specific examinations), or use of iterative reconstruction technique.      FINDINGS:  The visualized portions of the lung bases demonstrates no confluent  consolidation.     Marked hepatic steatosis. No splenic mass. No adrenal nodule. No pancreatic  ductal dilatation. No hydronephrosis or nephrolithiasis.     Nonaneurysmal aorta. No retroperitoneal adenopathy. No bowel obstruction. Colonic diverticulosis.     Bladder is negative. No acute displaced fracture.     IMPRESSION  IMPRESSION: No CT evidence for acute process in the abdomen or pelvis.     Hepatic steatosis         MD Wendy Mendez Jan 24, 2012  8:25 AM EST     --------------------------------------------------   Impression:     1. No acute abnormality. 2. Multilevel mild subluxations as described above.     --------------------------------------------------   Diagnostic Interpretation:     Five View Lumbar Spine     CPT Code 16231     Clinical History: Low back pain     Comparison: None       Findings:     AP, lateral, and bilateral oblique views of the lumbar spine are submitted for evaluation. There is no evidence of fracture. There is a levocurvature of the upper lumbar spine. There is 5 mm posterior subluxation of L5 on S1, 5 mm posterior subluxation of L4 on L5, 5 mm posterior subluxation of L3 on L4, and 5 mm posterior subluxation of L2 on L3. The disc spaces are normal in height. Osseous mineralization is radiographically normal.     The paravertebral soft tissue structures are unremarkable. _     Dictated by: Zac Arcos M.D., [facility]   Transcribed on: 24-JAN-2012 08:25   Finalized on: 24-JAN-2012 08:28   Finalized by: Zac Arcos M.D., [facility]      ** FINAL REPORT **  Assessment:    Sarbjit Gloria who has risk factors including (see above previous medical hx) and:       ICD-10-CM ICD-9-CM    1.  Encounter to establish care with new doctor Z76.89 V65.8 TSH 3RD GENERATION      LIPID PANEL      HEPATITIS PANEL, ACUTE      CBC WITH AUTOMATED DIFF      METABOLIC PANEL, COMPREHENSIVE      ETHYL ALCOHOL      DRUG SCREEN, URINE      HEMOGLOBIN A1C WITH EAG      HIV 1/2 AG/AB, 4TH GENERATION,W RFLX CONFIRM      COLLECTION VENOUS BLOOD,VENIPUNCTURE   2. Essential hypertension I10 401.9 LIPID PANEL      METABOLIC PANEL, COMPREHENSIVE      lisinopril (PRINIVIL, ZESTRIL) 20 mg tablet   3. Tobacco abuse disorder Z72.0 305.1 albuterol (PROVENTIL HFA, VENTOLIN HFA, PROAIR HFA) 90 mcg/actuation inhaler   4. Attention deficit hyperactivity disorder (ADHD), unspecified ADHD type F90.9 314.01 DRUG SCREEN, URINE      amphetamine-dextroamphetamine XR (ADDERALL XR) 30 mg XR capsule      REFERRAL TO PSYCHIATRY      DISCONTINUED: amphetamine-dextroamphetamine XR (ADDERALL XR) 30 mg XR capsule    Referred to Psychiatry for management. Pt given Script for Adderral starting 11/4,  reviewed and consistent   UDS collected today. Educated on ceasing marij   5. Influenza vaccination declined by patient Z28.21 V64.06    6. Chronic low back pain, unspecified back pain laterality, with sciatica presence unspecified M54.5 724.2 XR SPINE LUMB 2 OR 3 V    G89.29 338.29     Declines PT or medications at this time. Discussed back exercises   7. Recurrent acute suppurative otitis media without spontaneous rupture of left tympanic membrane H66.005 382.00 levoFLOXacin (LEVAQUIN) 750 mg tablet   8. Class 2 obesity due to excess calories without serious comorbidity with body mass index (BMI) of 37.0 to 37.9 in adult E66.09 278.00 TSH 3RD GENERATION    Z68.37 V85.37 LIPID PANEL      HEMOGLOBIN A1C WITH EAG   9. Fatty liver K76.0 571.8 TSH 3RD GENERATION      LIPID PANEL      HEMOGLOBIN A1C WITH EAG   10. Marijuana use F12.90 305.20      Diagnoses and all orders for this visit:    1.  Encounter to establish care with new doctor  -     [facility] 3RD GENERATION; Future  -     LIPID PANEL; Future  -     HEPATITIS PANEL, ACUTE; Future  -     CBC WITH AUTOMATED DIFF; Future  -     METABOLIC PANEL, COMPREHENSIVE; Future  -     ETHYL ALCOHOL; Future  -     DRUG SCREEN, URINE; Future  -     HEMOGLOBIN A1C WITH EAG; Future  -     HIV 1/2 AG/AB, 4TH GENERATION,W RFLX CONFIRM; Future  -     COLLECTION VENOUS BLOOD,VENIPUNCTURE    2. Essential hypertension  -     LIPID PANEL; Future  -     METABOLIC PANEL, COMPREHENSIVE; Future  -     lisinopril (PRINIVIL, ZESTRIL) 20 mg tablet; Take 1 Tab by mouth daily. 3. Tobacco abuse disorder  -     albuterol (PROVENTIL HFA, VENTOLIN HFA, PROAIR HFA) 90 mcg/actuation inhaler; Take 1-2 Puffs by inhalation every four (4) hours as needed for Wheezing. 4. Attention deficit hyperactivity disorder (ADHD), unspecified ADHD type  Comments:  Referred to Psychiatry for management. Pt given Script for Adderral starting 11/4,  reviewed and consistent. Pt educated on dangers of self dosing this med. Do not recommend increase in medication due to poorly controlled HTN. UDS collected today. Educated on ceasing marijuana use for future prescriptions. Orders:  -     DRUG SCREEN, URINE; Future  -     amphetamine-dextroamphetamine XR (ADDERALL XR) 30 mg XR capsule; Take 1 Cap (30 mg total) by mouth dailyEarliest Fill Date: 11/4/18. Max Daily Amount: 30 mg  -     REFERRAL TO PSYCHIATRY    5. Influenza vaccination declined by patient    6. Chronic low back pain, unspecified back pain laterality, with sciatica presence unspecified  Comments:  Declines PT or medications at this time. Discussed back exercises  Orders:  -     XR SPINE LUMB 2 OR 3 V; Future    7. Recurrent acute suppurative otitis media without spontaneous rupture of left tympanic membrane  -     levoFLOXacin (LEVAQUIN) 750 mg tablet; Take 1 Tab by mouth daily for 7 days.     8. Class 2 obesity due to excess calories without serious comorbidity with body mass index (BMI) of 37.0 to 37.9 in adult  -     TSH 3RD GENERATION; Future  -     LIPID PANEL; Future  -     HEMOGLOBIN A1C WITH EAG; Future    9. Fatty liver  -     TSH 3RD GENERATION; Future  -     LIPID PANEL; Future  -     HEMOGLOBIN A1C WITH EAG; Future    10. Marijuana use      lab results and schedule of future lab studies reviewed with patient  reviewed diet, exercise and weight control  very strongly urged to quit smoking to reduce cardiovascular risk  cardiovascular risk and specific lipid/LDL goals reviewed  reviewed medications and side effects in detail    Written instructions followed our verbal discussion of all information discussed above, pending tests ordered and future goals/plans. Patient expressed understanding of current diagnosis, planned testing, follow up and if needed to contact the office for any questions or concerns prior to the next visit. Plan:   Reviewed medication and completed the medication reconciliation with the patient. Reviewed side effects of medications with the patient. Questions were answered and patient verb understanding. Pt is a 31 yo  male. See [employer] for details. Pt in the office today to establish care, medication reconciliation. Labs obtained to establish baseline, evaluate metabolic health, nutritional status, vitamin deficiencies and screening for at risk items based on the demographics of the patient, previous medical history and current social practices.  Will contact the patient in when all labs are resulted by phone to review and make lifestyle and medication recommendations. Follow up labs will be completed to monitor improvement prior to their next visit.       Orders Placed This Encounter    COLLECTION VENOUS BLOOD,VENIPUNCTURE    XR SPINE LUMB 2 OR 3 V     Standing Status:   Future     Standing Expiration Date:   11/25/2019     Order Specific Question:   Reason for Exam     Answer:   Low back pain    TSH 3RD GENERATION     Standing Status:   Future     Number of Occurrences:   1     Standing Expiration Date:   4/26/2019    LIPID PANEL     Standing Status:   Future     Number of Occurrences:   1     Standing Expiration Date:   10/26/2019    HEPATITIS PANEL, ACUTE     Standing Status:   Future     Number of Occurrences:   1     Standing Expiration Date:   4/26/2019    CBC WITH AUTOMATED DIFF     Standing Status:   Future     Number of Occurrences:   1     Standing Expiration Date:   1/22/4112    METABOLIC PANEL, COMPREHENSIVE     Standing Status:   Future     Number of Occurrences:   1     Standing Expiration Date:   4/26/2019    ETHYL ALCOHOL     Standing Status:   Future     Number of Occurrences:   1     Standing Expiration Date:   4/26/2019    DRUG SCREEN, URINE     Standing Status:   Future     Number of Occurrences:   1     Standing Expiration Date:   4/26/2019    HEMOGLOBIN A1C WITH EAG     Standing Status:   Future     Number of Occurrences:   1     Standing Expiration Date:   10/26/2019    HIV 1/2 AG/AB, 4TH GENERATION,W RFLX CONFIRM     Standing Status:   Future     Number of Occurrences:   1     Standing Expiration Date:   4/26/2019    REFERRAL TO PSYCHIATRY     Referral Priority:   Routine     Referral Type:   Behavioral Health     Referral Reason:   Specialty Services Required     Referred to Provider:   Evin Thompson NP     Number of Visits Requested:   1    DISCONTD: amphetamine-dextroamphetamine XR (ADDERALL XR) 30 mg XR capsule     Sig: Take 30 mg by mouth daily.  albuterol (PROVENTIL HFA, VENTOLIN HFA, PROAIR HFA) 90 mcg/actuation inhaler     Sig: Take 1-2 Puffs by inhalation every four (4) hours as needed for Wheezing. Dispense:  1 Inhaler     Refill:  0    levoFLOXacin (LEVAQUIN) 750 mg tablet     Sig: Take 1 Tab by mouth daily for 7 days. Dispense:  7 Tab     Refill:  0    amphetamine-dextroamphetamine XR (ADDERALL XR) 30 mg XR capsule     Sig: Take 1 Cap (30 mg total) by mouth dailyEarliest Fill Date: 11/4/18.   Max Daily Amount: 30 mg     Dispense:  30 Cap     Refill:  0    lisinopril (PRINIVIL, ZESTRIL) 20 mg tablet     Sig: Take 1 Tab by mouth daily. Dispense:  30 Tab     Refill:  0     Current Outpatient Medications   Medication Sig Dispense Refill    albuterol (PROVENTIL HFA, VENTOLIN HFA, PROAIR HFA) 90 mcg/actuation inhaler Take 1-2 Puffs by inhalation every four (4) hours as needed for Wheezing. 1 Inhaler 0    levoFLOXacin (LEVAQUIN) 750 mg tablet Take 1 Tab by mouth daily for 7 days. 7 Tab 0    [START ON 11/4/2018] amphetamine-dextroamphetamine XR (ADDERALL XR) 30 mg XR capsule Take 1 Cap (30 mg total) by mouth dailyEarliest Fill Date: 11/4/18. Max Daily Amount: 30 mg 30 Cap 0    lisinopril (PRINIVIL, ZESTRIL) 20 mg tablet Take 1 Tab by mouth daily. 30 Tab 0     Medications Discontinued During This Encounter   Medication Reason    guaiFENesin-codeine (ROBITUSSIN AC) 100-10 mg/5 mL solution Not A Current Medication    HYDROcodone-acetaminophen (NORCO) 5-325 mg per tablet Not A Current Medication    loratadine (CLARITIN) 10 mg tablet Not A Current Medication    ondansetron (ZOFRAN ODT) 4 mg disintegrating tablet Not A Current Medication    triamcinolone (NASACORT AQ) 55 mcg nasal inhaler Not A Current Medication    ibuprofen (MOTRIN) 600 mg tablet Not A Current Medication    acetaminophen (TYLENOL) 325 mg tablet Not A Current Medication    albuterol (PROVENTIL HFA, VENTOLIN HFA, PROAIR HFA) 90 mcg/actuation inhaler Reorder    amphetamine-dextroamphetamine XR (ADDERALL XR) 30 mg XR capsule Reorder       Follow-up Disposition:  Return in about 2 weeks (around 11/8/2018).       Archie Brown, MSN, RN, FNP-C     [facility]

## 2018-10-25 NOTE — PATIENT INSTRUCTIONS
Nonalcoholic Steatohepatitis (ERNST): Care Instructions  Your Care Instructions    Nonalcoholic steatohepatitis (ERNST) is liver inflammation. It is caused by a buildup of fat in the liver. The fat buildup is not caused by drinking alcohol. Because of the inflammation, the liver does not work as well as it should. ERNST is part of a group of liver diseases called nonalcoholic fatty liver disease. In these diseases, fat builds up in the liver and sometimes causes liver damage. This damage can get worse over time. Follow-up care is a key part of your treatment and safety. Be sure to make and go to all appointments, and call your doctor if you are having problems. It's also a good idea to know your test results and keep a list of the medicines you take. How can you care for yourself at home? · Stay at a healthy weight. Or if you need to, slowly get to a healthy weight. · Control your cholesterol. Talk to your doctor about ways to lower your cholesterol, if needed. You might try getting active, taking medicines, and making healthy changes to your diet. · Eat healthy foods. This includes fruits, vegetables, lean meats and dairy, and whole grains. · If you have diabetes, keep your blood sugar at your target level. · Get at least 30 minutes of exercise on most days of the week. Walking is a good choice. You also may want to do other activities, such as running, swimming, cycling, or playing tennis or team sports. · Limit alcohol, or do not drink. Alcohol can damage the liver and cause health problems. When should you call for help? Call 911 anytime you think you may need emergency care.  For example, call if:    · You have trouble breathing.     · You vomit blood or what looks like coffee grounds.    Call your doctor now or seek immediate medical care if:    · You feel very sleepy or confused.     · You have new or worse belly pain.     · You have a fever.     · There is a new or increasing yellow tint to your skin or the whites of your eyes.     · You have any abnormal bleeding, such as:  ? Nosebleeds. ? Vaginal bleeding that is different (heavier, more frequent, at a different time of the month) than what you are used to.  ? Bloody or black stools, or rectal bleeding. ? Bloody or pink urine.    Watch closely for changes in your health, and be sure to contact your doctor if:    · Your belly is getting bigger.     · You are gaining weight.     · You have any problems. Where can you learn more? Go to http://selina-amrit.info/. Enter C542 in the search box to learn more about \"Nonalcoholic Steatohepatitis (ERNST): Care Instructions. \"  Current as of: March 28, 2018  Content Version: 11.8  © 9104-0822 Healthwise, Incorporated. Care instructions adapted under license by [employer] (which disclaims liability or warranty for this information). If you have questions about a medical condition or this instruction, always ask your healthcare professional. [place] [address] any warranty or liability for your use of this information.

## 2018-10-26 LAB
HAV IGM SER QL: NEGATIVE
HBV CORE IGM SER QL: NEGATIVE
HBV SURFACE AG SER QL: <0.1 INDEX
HBV SURFACE AG SER QL: NEGATIVE
HCV AB SER IA-ACNC: <0.02 INDEX
HCV AB SERPL QL IA: NEGATIVE
HCV COMMENT,HCGAC: NORMAL
HIV 1+2 AB+HIV1 P24 AG SERPL QL IA: NONREACTIVE
HIV12 RESULT COMMENT, HHIVC: NORMAL
SP1: NORMAL
SP2: NORMAL
SP3: NORMAL

## 2018-10-29 NOTE — PROGRESS NOTES
PLease notify patient new labs okl with the following exceptions. 1. Triglycerides elevated which is a part of cholesterol. Need to work on healthy diet and lifestyle changes such as exercise and wt loss. Will recheck. 2. Please notify patient he will no longer be getting scripts of adderral from this provider given positive UDS for cocaine and marijuana.

## 2018-10-31 NOTE — PROGRESS NOTES
Pt given results of lipid panel and UDS. Pt denies ETOH but does smoke pot stating \"Maybe it was laced. I don't do that\". Pt will try to stay away from refined carbs, sugar, fast/fried foods and increase exercise.  He is aware you will no longer write Rx for adderral.

## 2018-11-21 ENCOUNTER — OFFICE VISIT (OUTPATIENT)
Dept: FAMILY MEDICINE CLINIC | Age: 28
End: 2018-11-21

## 2018-11-21 DIAGNOSIS — F12.90 MARIJUANA USE: ICD-10-CM

## 2018-11-21 DIAGNOSIS — F90.9 ATTENTION DEFICIT HYPERACTIVITY DISORDER (ADHD), UNSPECIFIED ADHD TYPE: Primary | ICD-10-CM

## 2018-11-21 RX ORDER — DEXTROAMPHETAMINE SACCHARATE, AMPHETAMINE ASPARTATE, DEXTROAMPHETAMINE SULFATE AND AMPHETAMINE SULFATE 7.5; 7.5; 7.5; 7.5 MG/1; MG/1; MG/1; MG/1
30 TABLET ORAL DAILY
Qty: 30 TAB | Refills: 0 | Status: SHIPPED | OUTPATIENT
Start: 2018-11-21 | End: 2019-01-07

## 2018-11-21 RX ORDER — DEXTROAMPHETAMINE SACCHARATE, AMPHETAMINE ASPARTATE, DEXTROAMPHETAMINE SULFATE AND AMPHETAMINE SULFATE 3.75; 3.75; 3.75; 3.75 MG/1; MG/1; MG/1; MG/1
15 TABLET ORAL DAILY
Qty: 30 TAB | Refills: 0 | Status: SHIPPED | OUTPATIENT
Start: 2018-11-21 | End: 2019-01-07 | Stop reason: DRUGHIGH

## 2018-11-21 NOTE — PROGRESS NOTES
This well developed, well nourished  male is here today for an initial visit related to his prescription of Adderall. Patient is being seen for ADD. He has been treated for Add for several years. Has been using Adderall 30 mg XR twice daily and states it is not working well as one in the morning is good and the second one has been too much. Patient is requesting a dose adjustment and going to regular Adderall instead of XR. We discussed this at length and decided upon Adderall 30 mg once daily and Adderall 15 mg once daily. These are taken at different times. His affect  Is pleasant and he has cooperative and is in an interactive mood. Seems concerned about the effects of this medication and what will work best for him. Patient presents well developed and well nourished. Reports appetite  sleep is are good. Patient is open and candid regarding his diagnosis. We also discussed his use of marijuana and he was advised that positive marijuana lab results would cause discontinuation of his medication. Rational explained: if he uses THC to medicate, he doesn't need Adderall. Patient voiced his understanding. Patient deniess SI/HI and hallucinations both visual and/or auditory. Patient was given written information on ADD, depression and anxiety. Patient will return to office in 4-6 weeks.

## 2018-11-21 NOTE — PATIENT INSTRUCTIONS
Attention Deficit Hyperactivity Disorder (ADHD) in Adults: Care Instructions  Your Care Instructions    Attention deficit hyperactivity disorder, or ADHD, is a condition that makes it hard to pay attention. So you may have problems when you try to focus, get organized, and finish tasks. It might make you more active than other people. Or you might do things without thinking first.  ADHD is very common. It usually starts in early childhood. Many adults don't realize they have it until their children are diagnosed. Then they become aware of their own symptoms. Doctors don't know what causes ADHD. But it often runs in families. ADHD can be treated with medicines, behavior training, and counseling. Treatment can improve your life. Follow-up care is a key part of your treatment and safety. Be sure to make and go to all appointments, and call your doctor if you are having problems. It's also a good idea to know your test results and keep a list of the medicines you take. How can you care for yourself at home? · Learn all you can about ADHD. This will help you and your family understand it better. · Take your medicines exactly as prescribed. Call your doctor if you think you are having a problem with your medicine. You will get more details on the specific medicines your doctor prescribes. · If you miss a dose of your medicine, do not take an extra dose. · If your doctor suggests counseling, find a counselor you like and trust. Talk openly and honestly. Be willing to make some changes. · Find a support group for adults with ADHD. Talking to others with the same problems can help you feel better. It can also give you ideas about how to best cope with the condition. · Get rid of distractions at your work space. Keep your desk clean. Try not to face a window or busy hallway. · Use files, planners, and other tools to keep you organized. · Limit use of alcohol, and do not use illegal drugs.  People with ADHD tend to become addicted more easily than others. Tell your doctor if you need help to quit. Counseling, support groups, and sometimes medicines can help you stay free of alcohol or drugs. · Get at least 30 minutes of physical activity on most days of the week. Exercise has been shown to help people cope with ADHD. Walking is a good choice. You also may want to do other activities, such as running, swimming, cycling, or playing tennis or team sports. When should you call for help? Watch closely for changes in your health, and be sure to contact your doctor if:    · You feel sad a lot or cry all the time.     · You have trouble sleeping, or you sleep too much.     · You find it hard to concentrate, make decisions, or remember things.     · You change how you normally eat.     · You feel guilty for no reason. Where can you learn more? Go to http://selinaMeridian-IQamrit.info/. Enter B196 in the search box to learn more about \"Attention Deficit Hyperactivity Disorder (ADHD) in Adults: Care Instructions. \"  Current as of: December 7, 2017  Content Version: 11.8  © 8196-8010 [employer]. Care instructions adapted under license by [employer] (which disclaims liability or warranty for this information). If you have questions about a medical condition or this instruction, always ask your healthcare professional. [address] any warranty or liability for your use of this information. Learning About Attention Deficit Hyperactivity Disorder (ADHD) in Adults  What is ADHD? Attention deficit hyperactivity disorder (ADHD) is a condition in which people have a hard time paying attention. Adults with ADHD also may be more active than normal. They tend to act without thinking. ADHD may make it harder for them to focus, get organized, and finish tasks. ADHD most often starts in childhood and lasts into adulthood.  Many adults don't know that they have ADHD until their children are diagnosed. Then they begin to see their own symptoms. Doctors don't know what causes ADHD. But it tends to run in families. What are the symptoms? The most common types of ADHD symptoms in adults are attention problems and hyperactivity. Attention problems  Adults with ADHD often find it hard to:  · Finish tasks that don't interest them or aren't easy. But they may become obsessed with activities that they find interesting and enjoy. · Keep relationships. · Focus their attention on conversations, reading materials, or jobs. They may change jobs a lot. · Remember things. They may misplace or lose things. · Pay attention. They are easily distracted. They find it hard to focus on one task. · Think before they act. They may make quick decisions. They may act before they think about the effect of their actions. Hyperactivity  Adults with ADHD may:  · Fidget. They may swing their legs, shift in their seats, or tap their fingers. · Move around a lot. They may feel \"revved up\" or on the go. They may not be able to slow down until they are very tired. · Find it hard to relax. They may feel restless and find it hard to do quiet things like read or watch TV. How does ADHD affect daily life? ADHD in adults may affect:  · Job performance. They may find it hard to organize their work, manage their time, and focus on one task at a time. They may forget, misplace, or lose things. They may quit their jobs out of boredom. · Relationships. Adults with ADHD may find it hard to focus their attention on conversations. It is hard for them to \"read\" the behavior and moods of others and express their own feelings. · Temper. They may get easily frustrated. This often can make it harder for them to deal with stress. These adults may overreact and have a short, quick temper. · The ability to solve problems.  Adults who have a hard time waiting for things they want may act before they think about the effect of their actions. They may take part in risky behaviors. These include unprotected sex, unsafe driving, alcohol and drug use, or unwise business ventures. How is ADHD treated?   ADHD can be treated with medicines, behavior training, or counseling. Or it may be a combination of these treatments. Medicines   Stimulant medicines are most often used to treat ADHD. These may include:    · Amphetamines (such as Adderall and Dexedrine).     · Methylphenidate (such as Concerta, Daytrana, Focalin, Metadate, and Ritalin).    Other medicines that may be used are:    · Atomoxetine, such as Strattera, a nonstimulant medicine for ADHD.     · Antihypertensives. These include clonidine (such as Catapres) and guanfacine (such as Tenex).   · Antidepressants, which include bupropion (Wellbutrin).   Sustaination training can help adults with ADHD learn how to:    · Get organized. A daily organizer or planner can help these adults organize their daily tasks. They can write down appointments and other things they need to remember.     · Decrease distractions. They can set up their work or home environment so that there are fewer things that will distract them. They may find using headphones or a \"white noise\" machine helpful. College students can arrange a quiet living situation. They may need a single dorm room.     · Work on relationships. Social skills training can help adults with ADHD relate to family, friends, and coworkers. Couples counseling or family therapy can also help improve relationships.   Artis Zimmerman is not meant to treat inattention, hyperactivity, or impulsiveness. But it can help with some of the problems that go along with ADHD. These include not getting along well with others and having problems following rules. Where can you learn more? Go to http://selina-amrit.info/.   Enter K082 in the search box to learn more about \"Learning About Attention Deficit Hyperactivity Disorder (ADHD) in Adults. \"  Current as of: December 7, 2017  Content Version: 11.8  © 1250-9356 [employer]. Care instructions adapted under license by [facility] (which disclaims liability or warranty for this information). If you have questions about a medical condition or this instruction, always ask your healthcare professional. [address] any warranty or liability for your use of this information. Marijuana Use: Care Instructions  Your Care Instructions  During your exam, traces of marijuana were found in your body. The active chemical in marijuana is THC. THC usually can be found in urine for a few days after marijuana is used. If use is heavy, THC may be found for weeks after use has stopped. In the [place], marijuana laws vary widely. The drug is legal in some states, mainly as a medical treatment. But marijuana possession is still a crime under federal law. Many employers have strict rules about drug use. Many do drug testing. A positive drug test might cause you to lose your job. Or it might keep you from getting hired. Follow-up care is a key part of your treatment and safety. Be sure to make and go to all appointments, and call your doctor if you are having problems. It's also a good idea to know your test results and keep a list of the medicines you take. How can you care for yourself at home? · If you use marijuana, use it safely. Do not drive or operate heavy equipment. Using marijuana may affect your judgment and coordination. It can also increase your risk of being in a car crash. · Make sure you understand the laws in your area. Using marijuana may cause legal problems. · Know your employer's policies. When should you call for help? Watch closely for changes in your health, and contact your doctor if you think you have a problem with marijuana use. Where can you learn more?   Go to http://selina-amrit.info/. Enter I720 in the search box to learn more about \"Marijuana Use: Care Instructions. \"  Current as of: May 8, 2018  Content Version: 11.8  © 0056-5623 Healthwise, Incorporated. Care instructions adapted under license by [employer] (which disclaims liability or warranty for this information). If you have questions about a medical condition or this instruction, always ask your healthcare professional. [place] [address] any warranty or liability for your use of this information.

## 2019-01-07 ENCOUNTER — OFFICE VISIT (OUTPATIENT)
Dept: FAMILY MEDICINE CLINIC | Age: 29
End: 2019-01-07

## 2019-01-07 DIAGNOSIS — F90.9 ATTENTION DEFICIT HYPERACTIVITY DISORDER (ADHD), UNSPECIFIED ADHD TYPE: ICD-10-CM

## 2019-01-07 RX ORDER — DEXTROAMPHETAMINE SACCHARATE, AMPHETAMINE ASPARTATE, DEXTROAMPHETAMINE SULFATE AND AMPHETAMINE SULFATE 7.5; 7.5; 7.5; 7.5 MG/1; MG/1; MG/1; MG/1
30 TABLET ORAL DAILY
Qty: 90 TAB | Refills: 0 | Status: SHIPPED | OUTPATIENT
Start: 2019-01-07 | End: 2019-04-19

## 2019-01-07 NOTE — PROGRESS NOTES
This well developed, well nourished  male is here today for a F/U visit. Patient is being seen for attention deficit disorder. Patient presents with pleasant affect and pleasant mood. He presents well developed and well nourished. Reports appetite is good and sleep is good. Reports this visit is for his Adderall refill and related problems he had at the pharmacy getting his medication refilled. Patient is open and candid regarding what needs to be done regarding his prescription refills. He states that he has been doing very well taking 30 mg  Of Adderall in the morning and 15 mg in the afternoon as needed. Patient denies SI/HI, hallucinations both visual and/or auditory. States he has not had to use any marijuana to help with remaining calm and not becoming hyper. Patient was given written information on ADD/ADHD. Patient will return to office in eight weeks.

## 2019-01-07 NOTE — PATIENT INSTRUCTIONS
Attention Deficit Hyperactivity Disorder (ADHD) in Adults: Care Instructions  Your Care Instructions    Attention deficit hyperactivity disorder, or ADHD, is a condition that makes it hard to pay attention. So you may have problems when you try to focus, get organized, and finish tasks. It might make you more active than other people. Or you might do things without thinking first.  ADHD is very common. It usually starts in early childhood. Many adults don't realize they have it until their children are diagnosed. Then they become aware of their own symptoms. Doctors don't know what causes ADHD. But it often runs in families. ADHD can be treated with medicines, behavior training, and counseling. Treatment can improve your life. Follow-up care is a key part of your treatment and safety. Be sure to make and go to all appointments, and call your doctor if you are having problems. It's also a good idea to know your test results and keep a list of the medicines you take. How can you care for yourself at home? · Learn all you can about ADHD. This will help you and your family understand it better. · Take your medicines exactly as prescribed. Call your doctor if you think you are having a problem with your medicine. You will get more details on the specific medicines your doctor prescribes. · If you miss a dose of your medicine, do not take an extra dose. · If your doctor suggests counseling, find a counselor you like and trust. Talk openly and honestly. Be willing to make some changes. · Find a support group for adults with ADHD. Talking to others with the same problems can help you feel better. It can also give you ideas about how to best cope with the condition. · Get rid of distractions at your work space. Keep your desk clean. Try not to face a window or busy hallway. · Use files, planners, and other tools to keep you organized. · Limit use of alcohol, and do not use illegal drugs.  People with ADHD tend to become addicted more easily than others. Tell your doctor if you need help to quit. Counseling, support groups, and sometimes medicines can help you stay free of alcohol or drugs. · Get at least 30 minutes of physical activity on most days of the week. Exercise has been shown to help people cope with ADHD. Walking is a good choice. You also may want to do other activities, such as running, swimming, cycling, or playing tennis or team sports. When should you call for help? Watch closely for changes in your health, and be sure to contact your doctor if:    · You feel sad a lot or cry all the time.     · You have trouble sleeping, or you sleep too much.     · You find it hard to concentrate, make decisions, or remember things.     · You change how you normally eat.     · You feel guilty for no reason. Where can you learn more? Go to http://selina-amrit.info/. Enter B196 in the search box to learn more about \"Attention Deficit Hyperactivity Disorder (ADHD) in Adults: Care Instructions. \"  Current as of: December 7, 2017  Content Version: 11.8  © 2960-1649 Healthwise, Incorporated. Care instructions adapted under license by [employer] (which disclaims liability or warranty for this information). If you have questions about a medical condition or this instruction, always ask your healthcare professional. [place] [address] any warranty or liability for your use of this information.

## 2019-03-06 ENCOUNTER — OFFICE VISIT (OUTPATIENT)
Dept: FAMILY MEDICINE CLINIC | Age: 29
End: 2019-03-06

## 2019-03-06 DIAGNOSIS — F98.8 ADD (ATTENTION DEFICIT DISORDER) WITHOUT HYPERACTIVITY: Primary | ICD-10-CM

## 2019-03-06 PROBLEM — E66.01 SEVERE OBESITY (HCC): Status: ACTIVE | Noted: 2019-03-06

## 2019-03-06 RX ORDER — DEXTROAMPHETAMINE SACCHARATE, AMPHETAMINE ASPARTATE, DEXTROAMPHETAMINE SULFATE AND AMPHETAMINE SULFATE 7.5; 7.5; 7.5; 7.5 MG/1; MG/1; MG/1; MG/1
30 TABLET ORAL 2 TIMES DAILY
Qty: 120 TAB | Refills: 0 | Status: SHIPPED | OUTPATIENT
Start: 2019-03-06 | End: 2019-04-19

## 2019-03-06 NOTE — PROGRESS NOTES
This pleasant  male is here today for a F/U visit. Patient is being seen to prescriptions for ADD/ADHD. Patient presents with pleasant affect and pleasant mood. He presents well developed and well nourished. Reports appetite is good and sleep is good. States factors leading to depression and anxiety are unemployment and untreated ADD/ADHD. Patient is open/closed and candid regarding situation. States he has a new job as a  and is very happy about it as there were a lot of legal issues that were keeping him from becoming employed doing what he knows best. Those issues have been resolved. Patient denies SI/HI, hallucinations both visual and/or auditory. Patient will return to office in 3 months.

## 2019-04-19 ENCOUNTER — APPOINTMENT (OUTPATIENT)
Dept: CT IMAGING | Age: 29
End: 2019-04-19
Attending: EMERGENCY MEDICINE
Payer: SELF-PAY

## 2019-04-19 ENCOUNTER — HOSPITAL ENCOUNTER (EMERGENCY)
Age: 29
Discharge: HOME | End: 2019-04-19
Attending: EMERGENCY MEDICINE
Payer: SELF-PAY

## 2019-04-19 VITALS
HEART RATE: 78 BPM | OXYGEN SATURATION: 98 % | BODY MASS INDEX: 37.11 KG/M2 | RESPIRATION RATE: 18 BRPM | TEMPERATURE: 97.6 F | SYSTOLIC BLOOD PRESSURE: 159 MMHG | DIASTOLIC BLOOD PRESSURE: 96 MMHG | HEIGHT: 73 IN | WEIGHT: 280 LBS

## 2019-04-19 DIAGNOSIS — R59.1 LYMPHADENOPATHY: ICD-10-CM
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
## 2019-04-19 DIAGNOSIS — M25.561 ACUTE PAIN OF RIGHT KNEE: ICD-10-CM

## 2019-04-19 DIAGNOSIS — S20.211A CONTUSION OF RIGHT CHEST WALL, INITIAL ENCOUNTER: ICD-10-CM

## 2019-04-19 DIAGNOSIS — S50.311A ABRASION OF RIGHT ELBOW, INITIAL ENCOUNTER: ICD-10-CM
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
## 2019-04-19 DIAGNOSIS — S00.83XA FACIAL CONTUSION, INITIAL ENCOUNTER: ICD-10-CM

## 2019-04-19 LAB
ANION GAP SERPL CALC-SCNC: 6 MMOL/L (ref 3–18)
BASOPHILS # BLD: 0 K/UL (ref 0–0.1)
BASOPHILS NFR BLD: 0 % (ref 0–2)
BUN SERPL-MCNC: 14 MG/DL (ref 7–18)
BUN/CREAT SERPL: 14 (ref 12–20)
CALCIUM SERPL-MCNC: 9.1 MG/DL (ref 8.5–10.1)
CHLORIDE SERPL-SCNC: 106 MMOL/L (ref 100–108)
CO2 SERPL-SCNC: 26 MMOL/L (ref 21–32)
CREAT SERPL-MCNC: 1.01 MG/DL (ref 0.6–1.3)
DIFFERENTIAL METHOD BLD: ABNORMAL
EOSINOPHIL # BLD: 0.3 K/UL (ref 0–0.4)
EOSINOPHIL NFR BLD: 3 % (ref 0–5)
ERYTHROCYTE [DISTWIDTH] IN BLOOD BY AUTOMATED COUNT: 12.5 % (ref 11.6–14.5)
GLUCOSE SERPL-MCNC: 88 MG/DL (ref 74–99)
HCT VFR BLD AUTO: 45.9 % (ref 36–48)
HGB BLD-MCNC: 16.4 G/DL (ref 13–16)
LYMPHOCYTES # BLD: 3.1 K/UL (ref 0.9–3.6)
LYMPHOCYTES NFR BLD: 32 % (ref 21–52)
MCH RBC QN AUTO: 32.5 PG (ref 24–34)
MCHC RBC AUTO-ENTMCNC: 35.7 G/DL (ref 31–37)
MCV RBC AUTO: 91.1 FL (ref 74–97)
MONOCYTES # BLD: 1.2 K/UL (ref 0.05–1.2)
MONOCYTES NFR BLD: 13 % (ref 3–10)
NEUTS SEG # BLD: 5.1 K/UL (ref 1.8–8)
NEUTS SEG NFR BLD: 52 % (ref 40–73)
PLATELET # BLD AUTO: 298 K/UL (ref 135–420)
PMV BLD AUTO: 9.7 FL (ref 9.2–11.8)
POTASSIUM SERPL-SCNC: 3.6 MMOL/L (ref 3.5–5.5)
RBC # BLD AUTO: 5.04 M/UL (ref 4.7–5.5)
SODIUM SERPL-SCNC: 138 MMOL/L (ref 136–145)
WBC # BLD AUTO: 9.8 K/UL (ref 4.6–13.2)

## 2019-04-19 PROCEDURE — 74011250636 HC RX REV CODE- 250/636: Performed by: EMERGENCY MEDICINE

## 2019-04-19 PROCEDURE — 74011636320 HC RX REV CODE- 636/320: Performed by: EMERGENCY MEDICINE

## 2019-04-19 PROCEDURE — 71260 CT THORAX DX C+: CPT

## 2019-04-19 PROCEDURE — 70486 CT MAXILLOFACIAL W/O DYE: CPT

## 2019-04-19 PROCEDURE — 99282 EMERGENCY DEPT VISIT SF MDM: CPT

## 2019-04-19 PROCEDURE — 85025 COMPLETE CBC W/AUTO DIFF WBC: CPT

## 2019-04-19 PROCEDURE — 90715 TDAP VACCINE 7 YRS/> IM: CPT | Performed by: EMERGENCY MEDICINE

## 2019-04-19 PROCEDURE — 80048 BASIC METABOLIC PNL TOTAL CA: CPT

## 2019-04-19 PROCEDURE — 90471 IMMUNIZATION ADMIN: CPT

## 2019-04-19 RX ORDER — CLINDAMYCIN HYDROCHLORIDE 300 MG/1
300 CAPSULE ORAL 3 TIMES DAILY
Qty: 30 CAP | Refills: 0 | Status: SHIPPED | OUTPATIENT
Start: 2019-04-19 | End: 2019-04-29

## 2019-04-19 RX ADMIN — TETANUS TOXOID, REDUCED DIPHTHERIA TOXOID AND ACELLULAR PERTUSSIS VACCINE, ADSORBED 0.5 ML: 5; 2.5; 8; 8; 2.5 SUSPENSION INTRAMUSCULAR at 02:28

## 2019-04-19 RX ADMIN — IOPAMIDOL 80 ML: 612 INJECTION, SOLUTION INTRAVENOUS at 01:50

## 2019-04-19 NOTE — DISCHARGE INSTRUCTIONS
Patient Education        Scrapes (Abrasions): Care Instructions  Your Care Instructions  Scrapes (abrasions) are wounds where your skin has been rubbed or torn off. Most scrapes do not go deep into the skin, but some may remove several layers of skin. Scrapes usually don't bleed much, but they may ooze pinkish fluid. Scrapes on the head or face may appear worse than they are. They may bleed a lot because of the good blood supply to this area. Most scrapes heal well and may not need a bandage. They usually heal within 3 to 7 days. A large, deep scrape may take 1 to 2 weeks or longer to heal. A scab may form on some scrapes. Follow-up care is a key part of your treatment and safety. Be sure to make and go to all appointments, and call your doctor if you are having problems. It's also a good idea to know your test results and keep a list of the medicines you take. How can you care for yourself at home? · If your doctor told you how to care for your wound, follow your doctor's instructions. If you did not get instructions, follow this general advice:  ? Wash the scrape with clean water 2 times a day. Don't use hydrogen peroxide or alcohol, which can slow healing. ? You may cover the scrape with a thin layer of petroleum jelly, such as Vaseline, and a nonstick bandage. ? Apply more petroleum jelly and replace the bandage as needed. · Prop up the injured area on a pillow anytime you sit or lie down during the next 3 days. Try to keep it above the level of your heart. This will help reduce swelling. · Be safe with medicines. Take pain medicines exactly as directed. ? If the doctor gave you a prescription medicine for pain, take it as prescribed. ? If you are not taking a prescription pain medicine, ask your doctor if you can take an over-the-counter medicine. When should you call for help?   Call your doctor now or seek immediate medical care if:    · You have signs of infection, such as:  ? Increased pain, swelling, warmth, or redness around the scrape. ? Red streaks leading from the scrape. ? Pus draining from the scrape. ? A fever.     · The scrape starts to bleed, and blood soaks through the bandage. Oozing small amounts of blood is normal.    Watch closely for changes in your health, and be sure to contact your doctor if the scrape is not getting better each day. Where can you learn more? Go to http://selina-amrit.info/. Enter A374 in the search box to learn more about \"Scrapes (Abrasions): Care Instructions. \"  Current as of: September 23, 2018  Content Version: 11.9  © 9061-6599 [employer]. Care instructions adapted under license by [employer] (which disclaims liability or warranty for this information). If you have questions about a medical condition or this instruction, always ask your healthcare professional. [place] [address] any warranty or liability for your use of this information. Patient Education        Chest Contusion: Care Instructions  Your Care Instructions  A chest contusion, or bruise, is caused by a fall or direct blow to the chest. Car crashes, falls, getting punched, and injury from bicycle handlebars are common causes of chest contusions. A very forceful blow to the chest can injure the heart or blood vessels in the chest, the lungs, the airway, the liver, or the spleen. Pain may be caused by an injury to muscles, cartilage, or ribs. Deep breathing, coughing, or sneezing can increase your pain. Lying on the injured area also can cause pain. Follow-up care is a key part of your treatment and safety. Be sure to make and go to all appointments, and call your doctor if you are having problems. It's also a good idea to know your test results and keep a list of the medicines you take. How can you care for yourself at home? · Rest and protect the injured or sore area.  Stop, change, or take a break from any activity that may be causing your pain. · Put ice or a cold pack on the area for 10 to 20 minutes at a time. Put a thin cloth between the ice and your skin. · After 2 or 3 days, if your swelling is gone, apply a heating pad set on low or a warm cloth to your chest. Some doctors suggest that you go back and forth between hot and cold. Put a thin cloth between the heating pad and your skin. · Do not wrap or tape your ribs for support. This may cause you to take smaller breaths, which could increase your risk of pneumonia and lung collapse. · Ask your doctor if you can take an over-the-counter pain medicine, such as acetaminophen (Tylenol), ibuprofen (Advil, Motrin), or naproxen (Aleve). Be safe with medicines. Read and follow all instructions on the label. · Take your medicines exactly as prescribed. Call your doctor if you think you are having a problem with your medicine. · Gentle stretching and massage may help you feel better after a few days of rest. Stretch slowly to the point just before discomfort begins, then hold the stretch for at least 15 to 30 seconds. Do this 3 or 4 times per day. · As your pain gets better, slowly return to your normal activities. Be patient, because chest bruises can take weeks or months to heal. Any increased pain may be a sign that you need to rest a while longer. When should you call for help? Call 911 anytime you think you may need emergency care.  For example, call if:    · You have severe trouble breathing.     · You cough up blood.    Call your doctor now or seek immediate medical care if:    · You have belly pain.     · You are dizzy or lightheaded, or you feel like you may faint.     · You develop new symptoms with the chest pain.     · Your chest pain gets worse.     · You have a fever.     · You have some shortness of breath.     · You have a cough that brings up mucus from the lungs.    Watch closely for changes in your health, and be sure to contact your doctor if:    · Your chest pain is not improving after 1 week. Where can you learn more? Go to http://selina-amrit.info/. Enter I174 in the search box to learn more about \"Chest Contusion: Care Instructions. \"  Current as of: September 23, 2018  Content Version: 11.9  © 6445-7210 [employer]. Care instructions adapted under license by [employer] (which disclaims liability or warranty for this information). If you have questions about a medical condition or this instruction, always ask your healthcare professional. [place] [address] any warranty or liability for your use of this information. Patient Education        Head Injury: Care Instructions  Your Care Instructions    Most injuries to the head are minor. Bumps, cuts, and scrapes on the head and face usually heal well and can be treated the same as injuries to other parts of the body. Although it's rare, once in a while a more serious problem shows up after you are home. So it's good to be on the lookout for symptoms for a day or two. Follow-up care is a key part of your treatment and safety. Be sure to make and go to all appointments, and call your doctor if you are having problems. It's also a good idea to know your test results and keep a list of the medicines you take. How can you care for yourself at home? · Follow your doctor's instructions. He or she will tell you if you need someone to watch you closely for the next 24 hours or longer. · Take it easy for the next few days or more if you are not feeling well. · Ask your doctor when it's okay for you to go back to activities like driving a car, riding a bike, or operating machinery. When should you call for help? Call 911 anytime you think you may need emergency care.  For example, call if:    · You have a seizure.     · You passed out (lost consciousness).     · You are confused or can't stay awake.    Call your doctor now or seek immediate medical care if:    · You have new or worse vomiting.     · You feel less alert.     · You have new weakness or numbness in any part of your body.    Watch closely for changes in your health, and be sure to contact your doctor if:    · You do not get better as expected.     · You have new symptoms, such as headaches, trouble concentrating, or changes in mood. Where can you learn more? Go to http://selina-amrit.info/. Enter Y267 in the search box to learn more about \"Head Injury: Care Instructions. \"  Current as of: Kary 3, 2018  Content Version: 11.9  © 8811-7825 [employer]. Care instructions adapted under license by [employer] (which disclaims liability or warranty for this information). If you have questions about a medical condition or this instruction, always ask your healthcare professional. [address] any warranty or liability for your use of this information.
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
## 2019-06-05 ENCOUNTER — OFFICE VISIT (OUTPATIENT)
Dept: FAMILY MEDICINE CLINIC | Age: 29
End: 2019-06-05

## 2019-06-05 DIAGNOSIS — F90.2 ATTENTION DEFICIT HYPERACTIVITY DISORDER (ADHD), COMBINED TYPE: Primary | ICD-10-CM

## 2019-06-05 RX ORDER — DEXTROAMPHETAMINE SACCHARATE, AMPHETAMINE ASPARTATE, DEXTROAMPHETAMINE SULFATE AND AMPHETAMINE SULFATE 7.5; 7.5; 7.5; 7.5 MG/1; MG/1; MG/1; MG/1
30 TABLET ORAL 2 TIMES DAILY
Qty: 120 TAB | Refills: 0 | Status: SHIPPED | OUTPATIENT
Start: 2019-06-05 | End: 2019-08-07 | Stop reason: SDUPTHER

## 2019-06-05 NOTE — PROGRESS NOTES
This pleasant  male is here today for a F/U visit. Patient is being seen for ADD. Patient presents with Pleasant  affect and pleasant mood. He presents well developed and well nourished. Reports appetite is good and sleep is good. States he has been doing well. Did have an at random  Anxiety attack. Patient is open and candid regarding his current status. Patient denies SI/HI, hallucinations both   visual and/or auditory. Patient will return to office in 3 months.

## 2019-08-07 ENCOUNTER — OFFICE VISIT (OUTPATIENT)
Dept: FAMILY MEDICINE CLINIC | Age: 29
End: 2019-08-07

## 2019-08-07 DIAGNOSIS — F90.2 ATTENTION DEFICIT HYPERACTIVITY DISORDER (ADHD), COMBINED TYPE: ICD-10-CM

## 2019-08-07 RX ORDER — DEXTROAMPHETAMINE SACCHARATE, AMPHETAMINE ASPARTATE, DEXTROAMPHETAMINE SULFATE AND AMPHETAMINE SULFATE 7.5; 7.5; 7.5; 7.5 MG/1; MG/1; MG/1; MG/1
30 TABLET ORAL 2 TIMES DAILY
Qty: 120 TAB | Refills: 0 | Status: SHIPPED | OUTPATIENT
Start: 2019-08-07 | End: 2019-10-07 | Stop reason: SDUPTHER

## 2019-08-07 NOTE — PROGRESS NOTES
This pleasant  male  is here today for F/U visit. Patient is being seen for medication refill. Patient presents with pleasant affect and pleasant mood. He presents well developed and well nourished. Reports appetite is good and sleep is good. Patient denies SI/HI, hallucinations both visual and/or auditory. Patient will return to office in 8 weeks.

## 2019-10-07 ENCOUNTER — OFFICE VISIT (OUTPATIENT)
Dept: FAMILY MEDICINE CLINIC | Age: 29
End: 2019-10-07

## 2019-10-07 DIAGNOSIS — F90.2 ATTENTION DEFICIT HYPERACTIVITY DISORDER (ADHD), COMBINED TYPE: ICD-10-CM

## 2019-10-07 RX ORDER — DEXTROAMPHETAMINE SACCHARATE, AMPHETAMINE ASPARTATE, DEXTROAMPHETAMINE SULFATE AND AMPHETAMINE SULFATE 7.5; 7.5; 7.5; 7.5 MG/1; MG/1; MG/1; MG/1
30 TABLET ORAL 2 TIMES DAILY
Qty: 120 TAB | Refills: 0 | Status: SHIPPED | OUTPATIENT
Start: 2019-10-07 | End: 2019-10-07

## 2019-10-07 RX ORDER — DEXTROAMPHETAMINE SACCHARATE, AMPHETAMINE ASPARTATE, DEXTROAMPHETAMINE SULFATE AND AMPHETAMINE SULFATE 7.5; 7.5; 7.5; 7.5 MG/1; MG/1; MG/1; MG/1
30 TABLET ORAL 2 TIMES DAILY
Qty: 120 TAB | Refills: 0 | Status: SHIPPED | OUTPATIENT
Start: 2019-12-07 | End: 2020-01-06 | Stop reason: SDUPTHER

## 2019-10-07 NOTE — PROGRESS NOTES
This pleasant  male is here today for a F/U visit. Patient is being seen for ADHD. Patient presents with pleasant affect and pleasant mood. He presents well developed and   well nourished. Reports appetite is good and sleep is good also. Patient is open and candid   regarding how well the medication is working for him. Patient denies SI/HI, hallucinations   both visual and/or auditory. Patient will return to office in three months weeks.

## 2019-11-26 ENCOUNTER — APPOINTMENT (OUTPATIENT)
Dept: CT IMAGING | Age: 29
End: 2019-11-26
Attending: EMERGENCY MEDICINE
Payer: MEDICAID

## 2019-11-26 ENCOUNTER — HOSPITAL ENCOUNTER (EMERGENCY)
Age: 29
Discharge: HOME | End: 2019-11-26
Attending: EMERGENCY MEDICINE
Payer: MEDICAID

## 2019-11-26 ENCOUNTER — APPOINTMENT (OUTPATIENT)
Dept: GENERAL RADIOLOGY | Age: 29
End: 2019-11-26
Attending: EMERGENCY MEDICINE
Payer: MEDICAID

## 2019-11-26 VITALS
BODY MASS INDEX: 34.59 KG/M2 | TEMPERATURE: 98.3 F | WEIGHT: 261 LBS | RESPIRATION RATE: 22 BRPM | DIASTOLIC BLOOD PRESSURE: 76 MMHG | OXYGEN SATURATION: 98 % | HEIGHT: 73 IN | HEART RATE: 112 BPM | SYSTOLIC BLOOD PRESSURE: 144 MMHG

## 2019-11-26 DIAGNOSIS — S92.351A CLOSED DISPLACED FRACTURE OF FIFTH METATARSAL BONE OF RIGHT FOOT, INITIAL ENCOUNTER: ICD-10-CM

## 2019-11-26 DIAGNOSIS — T14.8XXA ABRASION: ICD-10-CM

## 2019-11-26 DIAGNOSIS — R59.0 REACTIVE CERVICAL NODES: ICD-10-CM
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
## 2019-11-26 DIAGNOSIS — K76.0 HEPATIC STEATOSIS: ICD-10-CM

## 2019-11-26 LAB
ABO + RH BLD: NORMAL
ALBUMIN SERPL-MCNC: 3.9 G/DL (ref 3.4–5)
ALBUMIN/GLOB SERPL: 1.3 {RATIO} (ref 0.8–1.7)
ALP SERPL-CCNC: 68 U/L (ref 45–117)
ALT SERPL-CCNC: 79 U/L (ref 16–61)
AMPHET UR QL SCN: NEGATIVE
ANION GAP SERPL CALC-SCNC: 10 MMOL/L (ref 3–18)
APPEARANCE UR: CLEAR
ARTERIAL PATENCY WRIST A: ABNORMAL
AST SERPL-CCNC: 55 U/L (ref 10–38)
ATRIAL RATE: 84 BPM
BARBITURATES UR QL SCN: NEGATIVE
BASE DEFICIT BLDV-SCNC: 1 MMOL/L
BASOPHILS # BLD: 0.1 K/UL (ref 0–0.1)
BASOPHILS NFR BLD: 1 % (ref 0–2)
BDY SITE: ABNORMAL
BENZODIAZ UR QL: NEGATIVE
BILIRUB SERPL-MCNC: 0.3 MG/DL (ref 0.2–1)
BILIRUB UR QL: NEGATIVE
BLOOD GROUP ANTIBODIES SERPL: NORMAL
BUN SERPL-MCNC: 9 MG/DL (ref 7–18)
BUN/CREAT SERPL: 9 (ref 12–20)
CALCIUM SERPL-MCNC: 8.8 MG/DL (ref 8.5–10.1)
CALCULATED P AXIS, ECG09: 45 DEGREES
CALCULATED R AXIS, ECG10: 41 DEGREES
CALCULATED T AXIS, ECG11: 51 DEGREES
CANNABINOIDS UR QL SCN: NEGATIVE
CHLORIDE SERPL-SCNC: 106 MMOL/L (ref 100–111)
CO2 SERPL-SCNC: 25 MMOL/L (ref 21–32)
COCAINE UR QL SCN: POSITIVE
COLOR UR: YELLOW
CREAT SERPL-MCNC: 0.95 MG/DL (ref 0.6–1.3)
DIAGNOSIS, 93000: NORMAL
DIFFERENTIAL METHOD BLD: ABNORMAL
EOSINOPHIL # BLD: 0.1 K/UL (ref 0–0.4)
EOSINOPHIL NFR BLD: 1 % (ref 0–5)
ERYTHROCYTE [DISTWIDTH] IN BLOOD BY AUTOMATED COUNT: 12.4 % (ref 11.6–14.5)
ETHANOL SERPL-MCNC: 227 MG/DL (ref 0–3)
GAS FLOW.O2 O2 DELIVERY SYS: ABNORMAL L/MIN
GLOBULIN SER CALC-MCNC: 3.1 G/DL (ref 2–4)
GLUCOSE SERPL-MCNC: 101 MG/DL (ref 74–99)
GLUCOSE UR STRIP.AUTO-MCNC: NEGATIVE MG/DL
HCO3 BLDV-SCNC: 24.5 MMOL/L (ref 23–28)
HCT VFR BLD AUTO: 47 % (ref 36–48)
HDSCOM,HDSCOM: ABNORMAL
HGB BLD-MCNC: 16.8 G/DL (ref 13–16)
HGB UR QL STRIP: ABNORMAL
INR PPP: 1 (ref 0.8–1.2)
KETONES UR QL STRIP.AUTO: NEGATIVE MG/DL
LACTATE BLD-SCNC: 2.58 MMOL/L (ref 0.4–2)
LEUKOCYTE ESTERASE UR QL STRIP.AUTO: NEGATIVE
LIPASE SERPL-CCNC: 269 U/L (ref 73–393)
LYMPHOCYTES # BLD: 2.9 K/UL (ref 0.9–3.6)
LYMPHOCYTES NFR BLD: 27 % (ref 21–52)
MCH RBC QN AUTO: 33 PG (ref 24–34)
MCHC RBC AUTO-ENTMCNC: 35.7 G/DL (ref 31–37)
MCV RBC AUTO: 92.3 FL (ref 74–97)
METHADONE UR QL: NEGATIVE
MONOCYTES # BLD: 0.9 K/UL (ref 0.05–1.2)
MONOCYTES NFR BLD: 9 % (ref 3–10)
NEUTS SEG # BLD: 6.7 K/UL (ref 1.8–8)
NEUTS SEG NFR BLD: 62 % (ref 40–73)
NITRITE UR QL STRIP.AUTO: NEGATIVE
OPIATES UR QL: NEGATIVE
P-R INTERVAL, ECG05: 176 MS
PCO2 BLDV: 44.1 MMHG (ref 41–51)
PCP UR QL: NEGATIVE
PH BLDV: 7.35 [PH] (ref 7.32–7.42)
PH UR STRIP: 5.5 [PH] (ref 5–8)
PLATELET # BLD AUTO: 288 K/UL (ref 135–420)
PMV BLD AUTO: 9.3 FL (ref 9.2–11.8)
PO2 BLDV: 57 MMHG (ref 25–40)
POTASSIUM SERPL-SCNC: 3.5 MMOL/L (ref 3.5–5.5)
PROT SERPL-MCNC: 7 G/DL (ref 6.4–8.2)
PROT UR STRIP-MCNC: NEGATIVE MG/DL
PROTHROMBIN TIME: 12.6 SEC (ref 11.5–15.2)
Q-T INTERVAL, ECG07: 390 MS
QRS DURATION, ECG06: 98 MS
QTC CALCULATION (BEZET), ECG08: 460 MS
RBC # BLD AUTO: 5.09 M/UL (ref 4.7–5.5)
RBC #/AREA URNS HPF: NEGATIVE /HPF (ref 0–5)
SAO2 % BLDV: 88 % (ref 65–88)
SERVICE CMNT-IMP: ABNORMAL
SODIUM SERPL-SCNC: 141 MMOL/L (ref 136–145)
SP GR UR REFRACTOMETRY: <1.005 (ref 1–1.03)
SPECIMEN EXP DATE BLD: NORMAL
SPECIMEN TYPE: ABNORMAL
TROPONIN I SERPL-MCNC: <0.02 NG/ML (ref 0–0.04)
UROBILINOGEN UR QL STRIP.AUTO: 0.2 EU/DL (ref 0.2–1)
VENTRICULAR RATE, ECG03: 84 BPM
WBC # BLD AUTO: 10.7 K/UL (ref 4.6–13.2)
WBC URNS QL MICRO: NEGATIVE /HPF (ref 0–4)

## 2019-11-26 PROCEDURE — 74011636320 HC RX REV CODE- 636/320: Performed by: EMERGENCY MEDICINE

## 2019-11-26 PROCEDURE — 73610 X-RAY EXAM OF ANKLE: CPT

## 2019-11-26 PROCEDURE — 90471 IMMUNIZATION ADMIN: CPT

## 2019-11-26 PROCEDURE — 81001 URINALYSIS AUTO W/SCOPE: CPT

## 2019-11-26 PROCEDURE — 93005 ELECTROCARDIOGRAM TRACING: CPT

## 2019-11-26 PROCEDURE — 70450 CT HEAD/BRAIN W/O DYE: CPT

## 2019-11-26 PROCEDURE — 96361 HYDRATE IV INFUSION ADD-ON: CPT

## 2019-11-26 PROCEDURE — 84484 ASSAY OF TROPONIN QUANT: CPT

## 2019-11-26 PROCEDURE — 82803 BLOOD GASES ANY COMBINATION: CPT

## 2019-11-26 PROCEDURE — 70486 CT MAXILLOFACIAL W/O DYE: CPT

## 2019-11-26 PROCEDURE — 73560 X-RAY EXAM OF KNEE 1 OR 2: CPT

## 2019-11-26 PROCEDURE — 85025 COMPLETE CBC W/AUTO DIFF WBC: CPT

## 2019-11-26 PROCEDURE — 74177 CT ABD & PELVIS W/CONTRAST: CPT

## 2019-11-26 PROCEDURE — 73110 X-RAY EXAM OF WRIST: CPT

## 2019-11-26 PROCEDURE — 74011250636 HC RX REV CODE- 250/636: Performed by: EMERGENCY MEDICINE

## 2019-11-26 PROCEDURE — 80307 DRUG TEST PRSMV CHEM ANLYZR: CPT

## 2019-11-26 PROCEDURE — 72125 CT NECK SPINE W/O DYE: CPT

## 2019-11-26 PROCEDURE — 71275 CT ANGIOGRAPHY CHEST: CPT

## 2019-11-26 PROCEDURE — 83690 ASSAY OF LIPASE: CPT

## 2019-11-26 PROCEDURE — 90715 TDAP VACCINE 7 YRS/> IM: CPT | Performed by: EMERGENCY MEDICINE

## 2019-11-26 PROCEDURE — 73630 X-RAY EXAM OF FOOT: CPT

## 2019-11-26 PROCEDURE — 80053 COMPREHEN METABOLIC PANEL: CPT

## 2019-11-26 PROCEDURE — 85610 PROTHROMBIN TIME: CPT

## 2019-11-26 PROCEDURE — 96374 THER/PROPH/DIAG INJ IV PUSH: CPT

## 2019-11-26 PROCEDURE — 99285 EMERGENCY DEPT VISIT HI MDM: CPT

## 2019-11-26 PROCEDURE — 83605 ASSAY OF LACTIC ACID: CPT

## 2019-11-26 PROCEDURE — 86900 BLOOD TYPING SEROLOGIC ABO: CPT

## 2019-11-26 RX ORDER — ACETAMINOPHEN 500 MG
1000 TABLET ORAL ONCE
Status: DISCONTINUED | OUTPATIENT
Start: 2019-11-26 | End: 2019-11-26 | Stop reason: HOSPADM

## 2019-11-26 RX ORDER — KETOROLAC TROMETHAMINE 15 MG/ML
15 INJECTION, SOLUTION INTRAMUSCULAR; INTRAVENOUS ONCE
Status: COMPLETED | OUTPATIENT
Start: 2019-11-26 | End: 2019-11-26

## 2019-11-26 RX ADMIN — SODIUM CHLORIDE, SODIUM LACTATE, POTASSIUM CHLORIDE, AND CALCIUM CHLORIDE 1000 ML: 600; 310; 30; 20 INJECTION, SOLUTION INTRAVENOUS at 04:26

## 2019-11-26 RX ADMIN — IOPAMIDOL 100 ML: 755 INJECTION, SOLUTION INTRAVENOUS at 05:40

## 2019-11-26 RX ADMIN — KETOROLAC TROMETHAMINE 15 MG: 15 INJECTION, SOLUTION INTRAMUSCULAR; INTRAVENOUS at 04:38

## 2019-11-26 RX ADMIN — TETANUS TOXOID, REDUCED DIPHTHERIA TOXOID AND ACELLULAR PERTUSSIS VACCINE, ADSORBED 0.5 ML: 5; 2.5; 8; 8; 2.5 SUSPENSION INTRAMUSCULAR at 06:01
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
## 2019-11-26 NOTE — ED NOTES
Patient removed cervical collar. Encouraged patient to keep cervical collar on. Put cervical collar back on patient.
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
## 2019-11-26 NOTE — ED NOTES
Applied post-op boot to left foot. Patient removed cervical collar. Encouraged patient to keep cervical collar on. Put cervical collar back on patient.
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
## 2019-12-03 ENCOUNTER — OFFICE VISIT (OUTPATIENT)
Dept: ORTHOPEDIC SURGERY | Age: 29
End: 2019-12-03

## 2019-12-03 VITALS
DIASTOLIC BLOOD PRESSURE: 88 MMHG | WEIGHT: 261 LBS | HEIGHT: 73 IN | HEART RATE: 71 BPM | BODY MASS INDEX: 34.59 KG/M2 | SYSTOLIC BLOOD PRESSURE: 136 MMHG | OXYGEN SATURATION: 98 % | TEMPERATURE: 97 F | RESPIRATION RATE: 15 BRPM

## 2019-12-03 DIAGNOSIS — S92.355A CLOSED NONDISPLACED FRACTURE OF FIFTH METATARSAL BONE OF LEFT FOOT, INITIAL ENCOUNTER: ICD-10-CM

## 2019-12-03 DIAGNOSIS — M79.672 LEFT FOOT PAIN: Primary | ICD-10-CM

## 2019-12-03 DIAGNOSIS — S82.65XA CLOSED NONDISPLACED FRACTURE OF LATERAL MALLEOLUS OF LEFT FIBULA, INITIAL ENCOUNTER: ICD-10-CM

## 2019-12-03 DIAGNOSIS — M25.572 ACUTE LEFT ANKLE PAIN: ICD-10-CM

## 2019-12-03 DIAGNOSIS — M79.641 RIGHT HAND PAIN: ICD-10-CM

## 2019-12-03 RX ORDER — SULFAMETHOXAZOLE AND TRIMETHOPRIM 800; 160 MG/1; MG/1
1 TABLET ORAL 2 TIMES DAILY
Qty: 14 TAB | Refills: 0 | Status: SHIPPED | OUTPATIENT
Start: 2019-12-03 | End: 2019-12-10

## 2019-12-03 RX ORDER — CEPHALEXIN 500 MG/1
500 CAPSULE ORAL 4 TIMES DAILY
Qty: 28 CAP | Refills: 0 | Status: CANCELLED | OUTPATIENT
Start: 2019-12-03 | End: 2019-12-10

## 2019-12-03 RX ORDER — HYDROCODONE BITARTRATE AND ACETAMINOPHEN 5; 325 MG/1; MG/1
1-2 TABLET ORAL
Qty: 30 TAB | Refills: 0 | Status: SHIPPED | OUTPATIENT
Start: 2019-12-03 | End: 2019-12-11
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
## 2019-12-03 NOTE — PROGRESS NOTES
1. Have you been to the ER, urgent care clinic since your last visit? Hospitalized since your last visit? No    2. Have you seen or consulted any other health care providers outside of the [address] [place] [address] since your last visit? Include any pap smears or colon screening.  No

## 2019-12-03 NOTE — PATIENT INSTRUCTIONS
You have been provided with an order for durable medical equipment that you may  at an outside facility as our office does not carry the equipment you need. You may pick it up at any medical supply company you like. Listed below are a few different locations for your convenience: [facility] [address] [place] [address] Street Phone: [phone]

## 2019-12-06 ENCOUNTER — HOSPITAL ENCOUNTER (OUTPATIENT)
Dept: CT IMAGING | Age: 29
Discharge: HOME | End: 2019-12-06
Attending: ORTHOPAEDIC SURGERY
Payer: MEDICAID

## 2019-12-06 DIAGNOSIS — M79.672 LEFT FOOT PAIN: ICD-10-CM

## 2019-12-06 DIAGNOSIS — M25.572 ACUTE LEFT ANKLE PAIN: ICD-10-CM

## 2019-12-06 PROCEDURE — 73700 CT LOWER EXTREMITY W/O DYE: CPT

## 2019-12-17 ENCOUNTER — OFFICE VISIT (OUTPATIENT)
Dept: ORTHOPEDIC SURGERY | Age: 29
End: 2019-12-17

## 2019-12-17 VITALS
TEMPERATURE: 97.4 F | HEIGHT: 73 IN | SYSTOLIC BLOOD PRESSURE: 134 MMHG | OXYGEN SATURATION: 98 % | WEIGHT: 260 LBS | HEART RATE: 66 BPM | BODY MASS INDEX: 34.46 KG/M2 | DIASTOLIC BLOOD PRESSURE: 81 MMHG

## 2019-12-17 DIAGNOSIS — S92.355D CLOSED NONDISPLACED FRACTURE OF FIFTH METATARSAL BONE OF LEFT FOOT WITH ROUTINE HEALING, SUBSEQUENT ENCOUNTER: ICD-10-CM

## 2019-12-17 DIAGNOSIS — S82.65XD CLOSED NONDISPLACED FRACTURE OF LATERAL MALLEOLUS OF LEFT FIBULA WITH ROUTINE HEALING, SUBSEQUENT ENCOUNTER: Primary | ICD-10-CM

## 2019-12-17 RX ORDER — HYDROCODONE BITARTRATE AND ACETAMINOPHEN 7.5; 325 MG/1; MG/1
1 TABLET ORAL
Qty: 30 TAB | Refills: 0 | Status: SHIPPED | OUTPATIENT
Start: 2019-12-17 | End: 2019-12-30 | Stop reason: SDUPTHER

## 2019-12-17 RX ORDER — ONDANSETRON 4 MG/1
4 TABLET, FILM COATED ORAL
Qty: 20 TAB | Refills: 0 | Status: SHIPPED | OUTPATIENT
Start: 2019-12-17 | End: 2020-03-23

## 2019-12-17 NOTE — PROGRESS NOTES
AMBULATORY PROGRESS NOTE      Patient: Angie Adams             MRN: [id number]     SSN: [id number] Body mass index is 34.3 kg/m². YOB: 1990     AGE: 34 y.o. SEX: male    PCP: Jean Marques NP     IMPRESSION/DIAGNOSIS AND TREATMENT PLAN     DIAGNOSES  1. Closed nondisplaced fracture of lateral malleolus of left fibula with routine healing, subsequent encounter    2. Closed nondisplaced fracture of fifth metatarsal bone of left foot with routine healing, subsequent encounter        Orders Placed This Encounter    AMB SUPPLY ORDER    HYDROcodone-acetaminophen (NORCO) 7.5-325 mg per tablet    ondansetron hcl (ZOFRAN) 4 mg tablet      Angie Adams understands his diagnoses and the proposed plan. Plan:    1) DME Order: Tall left CAM walker boot. 2) Take Aspirin 325 mg BID. 3) Norco 7.5 m-2 PO BIDPRN; 30 tablets, 0 refills. 4) Zofran 4 m PO BIDRN; 20 tablets, 0 refills. 5) Continue activity modification as directed. RTO - 3 weeks // PLEASE OBTAIN X-RAYS OF: left ankle and left foot 3 VIEWS      HPI AND EXAMINATION     Angie Adams IS A 34 y.o. male who presents to my outpatient office [facility] a follow up visit of closed nondisplaced fracture of lateral malleolus of left fibula and closed nondisplaced fracture of fifth metatarsal bone of left foot. At the last visit, I ordered a CT scan of the left foot and left ankle, provided orders for a short left CAM boot and crutches, provided a referral to Dr. Anibal Egan, prescribed Norco 5 mg and Bactrim  mg, instructed the patient to continue activity modification as directed, and to remain NWB to the [facility]. Date of injury: 2019. Since we saw him last, Mr. Kirk Garcia states that he is still experiencing pain in his left foot and ankle. He notes that he is experiencing pain over his posteromedial and anterior ankle. He states he feels as though something is trying to \"poke out\" of his ankle.  He inquires about whether he can be placed in a cast or a taller boot, as he feels that his boot is too loose and that his foot moves around in it too much. He adds that he has also been experiencing pain in his left knee. Of note, he inquires about whether he can receive another prescription for pain medication. He inquires about whether he can receive a higher dose, so that he may break it in half and take a half tablet. The patient is allergic to amoxicillin. Visit Vitals  /81   Pulse 66   Temp 97.4 °F (36.3 °C)   Ht 6' 1\" (1.854 m)   Wt 260 lb (117.9 kg)   SpO2 98%   BMI 34.30 kg/m²     Appearance: Alert, well appearing and pleasant patient who is in no distress, oriented to person, place/time, and who follows commands. Psychiatric: Affect and mood are appropriate. Cardiovascular/Peripheral Vascular: Normal Pulses to each hand and foot  Musculoskeletal:  LOCATION:  Left FOOT/ANKLE  Integumentary: No rashes, skin patches, wounds, or abrasions to the right or left legs       Warm and normal color. No regions of expressible drainage.     Mild to moderate swelling to lateral Ankle mild to moderate present    Nice wrinkles    Swelling to Medial Ankle not present    Swelling to the dorsolateral foot    Swelling to the 5th metatarsal base is present      Gait: antalgic, in hard sole shoe      Tenderness: ATFL/CFL Anterolateral ankle ligaments tenderness is not present   Lateral and medial hindfoot tenderness is present   Lateral and medial calcaneal wall tenderness is present   Distal fibula tenderness is present   Anterior Syndesmosis is not present    Medial deltoid ligament tenderness is present    Peroneal tendon sheath present    4/5 Metatarsal base tenderness is present     Midfoot tenderness is not present    Achilles tenderness is not present    Cuboid tenderness is present     Proximal fibula tenderness: is not present      Motor Strength/Tone Exam: Normal to the toes with respect to extension/flexion      Sensory Exam:   Intact Normal Sensation to ankle/foot      Stability Testing: Peroneal tendon instability tests: not conducted due to tenderness              Stability of ankle and subtalar region not tested due to tenderness      ROM: Not tested      Contractures: No Achilles or Gastrocnemius Contractures      Calf tenderness: Absent for calf or gastrocnemius muscle regions       Soft, supple, non tender, non taut lower extremity compartments       Alignment: Neutral Hindfoot  Wounds/Abrasions:   None present  Extremities:   No embolic phenomena to the toes or hands         No significant edema to the foot and or toes.         Lower extremities are warm and appear well perfused    DVT: No evidence of DVT seen on examination at this time             No calf swelling, no tenderness to calf muscles  Lymphatic:  No Evidence of Lymphedema  Vascular: Medial Border of Tibia Region: Edema is not present        Pulses: Dorsalis Pedis &  Posterior Tibial Pulses : Palpable yes        Varicosities Lower Limbs :  None    Neuro: Negative bilateral Straight leg raise (seated position)    See Musculoskeletal section for pertinent individual extremity examination    No abnormal hand/wrist, foot/ankle, or facial/neck tremors    Knees:  Left       Cutaneous: Skin intact, no abrasions, blisters, wounds, erythema       Effusion: Is not present       Crepitus:  mild PF joint crepitus       Tenderness: Patellar tendon        Alignment of Knee: neutral when standing       ROM: full range of motion       Fullness or swelling: None to popliteal fossa region       Stability: No instability to anterior, posterior, varus, valgus stress testing       Thrust: No varus thrust with gait       Neuro:  Extensor mechanism is intact    CHART REVIEW     Past Medical History:   Diagnosis Date    ADD (attention deficit disorder)     Back injury     Back problem     Bipolar affective disorder ([facility] [address]     Depression     Fatty liver     Fractures     3 slip disk;     History of ear infections     Hypertension     Positive urine drug screen 10/25/2018    +cocaine and thc     Suicidal ideation      Current Outpatient Medications   Medication Sig    HYDROcodone-acetaminophen (NORCO) 7.5-325 mg per tablet Take 1 Tab by mouth two (2) times daily as needed for Pain for up to 15 days. Max Daily Amount: 2 Tabs.  ondansetron hcl (ZOFRAN) 4 mg tablet Take 1 Tab by mouth every eight (8) hours as needed for Nausea.  dextroamphetamine-amphetamine (ADDERALL) 30 mg tablet Take 1 Tab by mouth two (2) times a day. Max Daily Amount: 2 Tabs. Indications: Attention Deficit Disorder with Hyperactivity    albuterol (PROVENTIL HFA, VENTOLIN HFA, PROAIR HFA) 90 mcg/actuation inhaler Take 1-2 Puffs by inhalation every four (4) hours as needed for Wheezing. No current facility-administered medications for this visit. Allergies   Allergen Reactions    Amoxicillin Swelling     Past Surgical History:   Procedure Laterality Date    HX TYMPANOSTOMY       Social History     Occupational History    Occupation: Unemployed   Tobacco Use    Smoking status: Current Every Day Smoker     Packs/day: 1.00     Years: 12.00     Pack years: 12.00    Smokeless tobacco: Never Used   Substance and Sexual Activity    Alcohol use:  Yes     Alcohol/week: 6.7 standard drinks     Types: 8 Cans of beer per week     Frequency: Monthly or less     Drinks per session: 5 or 6     Binge frequency: Less than monthly     Comment: social    Drug use: Yes     Types: Marijuana    Sexual activity: Yes     Partners: Female     Birth control/protection: Condom     Family History   Problem Relation Age of Onset    Cancer Father         mesothelioma    Heart Disease Maternal Grandmother     Diabetes Maternal Grandmother         REVIEW OF SYSTEMS : 12/17/2019  ALL BELOW ARE Negative except : SEE HPI         REVIEW OF SYSTEMS : Total of 12 systems reviewed as follows: CONSTITUTIONAL: No weight loss. PSYCHOLOGICAL : No Feelings of anxiety, depression, agitation  EYES: No blurred vision and no eye discharge. NO eye pain, double vision  ENT: No nasal discharge. No ear pain. CARDIOVASCULAR: No chest pain and no diaphoresis. RESPIRATORY: No cough, no hemoptysis. GI: No vomiting, no diarrhea   : No urinary frequency and no dysuria. MUSCULOSKELETAL: see HPI  SKIN: No rashes. NEURO: Negative for : dizziness,weakness, headaches     Negative for : visual changes or confusion, or seizures,   ENDOCRINE: No polyphagia and no polydipsia. HEMATOLOGY: No bleeding tendencies. DIAGNOSTIC IMAGING     No notes on file     CT Results (maximum last 3): Results from [place] encounter on 12/06/19   CT ANKLE LT WO CONT    Addendum Addendum: EXAM: CT ANKLE LT WO CONT, CT FOOT LT WO CONT      Gita Gilbert MD 12/6/2019  6:22 PM          Narrative XAM: CT ANKLE LT WO CONT, CT FOOT LT WO CONT    CLINICAL INDICATION/HISTORY: 34 years Male. left ankle pain/injury   ADDITIONAL HISTORY: Questionable distal fibular fracture. Technique: Contiguous axial images of the left foot and ankle were obtained  without IV contrast. Sagittal and coronal reformatted images were reconstructed  from this data. CT scans at this facility are performed using dose optimization technique as  appropriate with performed exam, to include automated exposure control,  adjustment of mA and/or kV according to patient's size (including appropriate  matching for site-specific examinations), or use of iterative reconstruction  technique. Comparison: Left foot and ankle radiograph 1/26/2019    Findings:       LEFT ANKLE:    Osseous structures and joints:    Tiny avulsion fracture at the tip of the lateral malleolus. There is a small  tibiotalar joint effusion. There are small tibiotalar marginal osteophytes.  There is a corticated ossicle  at the dorsal aspect of the talar neck, likely reflecting sequela of prior  capsular injury. Mild talonavicular marginal osteophytes are present. Mild to  moderate plantar and mild dorsal calcaneal enthesopathy of centimeter corticated  ossicle is also noted in the medial gutter. .    There is a 2.0 x 0.7 cm nonaggressive appearing peripherally based lesion in the  lateral aspect of the distal tibial diaphysis, with narrow zone of transition  and no appreciable periosteal reaction, likely a healed nonossifying fibroma. Soft tissues: There is limited assessment of tendons and musculature and ligaments by CT. The  visualized flexor, extensor, peroneal, and Achilles tendons are grossly intact. Muscle fat planes are preserved. There is suspected high-grade partial or  full-thickness tear of the anterior talofibular ligament. Moderate degree of  edema is present within the anterolateral gutter. No gross evidence of  calcaneofibular, posterior talofibular ligament tears. The syndesmotic ligaments  are grossly intact. No definite superficial deltoid and spring ligament injury. Corticated ossicle in the distribution of the deep deltoid likely sequela of  prior injury. LEFT FOOT:    Osseous structures and joints: There is a subacute mildly displaced, impacted, and comminuted extra-articular  fracture of the fifth metatarsal head-neck junction. There is an 8 mm bone  fragment noted medial to the primary fracture plane. There is mild osseous  blurring adjacent to the fracture margins which is suggestive of early healing. No additional fracture is seen. Alignment is otherwise anatomic. The Lisfranc  articulation is intact. Soft tissues: The assessment of tendons, musculature, and ligaments by CT. The visualized  tendons are grossly intact. Muscle fat planes are preserved. There is mild  diffuse subcutaneous tissues edema. Impression IMPRESSION[de-identified]  1.  Subacute fibular avulsion of the anterior talofibular ligament.  Small  tibiotalar effusion and mild fluid in the anterolateral gutter. 2.  Mildly displaced, comminuted, impacted extra-articular fifth metatarsal  head-neck junction fracture. 3.  Nonaggressive appearing distal tibial peripherally based sclerotic lesion,  likely a healed nonossifying fibroma. Please see above for additional details. CT FOOT LT WO CONT    Addendum Addendum: EXAM: CT ANKLE LT WO CONT, CT FOOT LT WO CONT      Dayne Boo MD 12/6/2019  6:22 PM          Narrative XAM: CT ANKLE LT WO CONT, CT FOOT LT WO CONT    CLINICAL INDICATION/HISTORY: 34 years Male. left ankle pain/injury   ADDITIONAL HISTORY: Questionable distal fibular fracture. Technique: Contiguous axial images of the left foot and ankle were obtained  without IV contrast. Sagittal and coronal reformatted images were reconstructed  from this data. CT scans at this facility are performed using dose optimization technique as  appropriate with performed exam, to include automated exposure control,  adjustment of mA and/or kV according to patient's size (including appropriate  matching for site-specific examinations), or use of iterative reconstruction  technique. Comparison: Left foot and ankle radiograph 1/26/2019    Findings:       LEFT ANKLE:    Osseous structures and joints:    Tiny avulsion fracture at the tip of the lateral malleolus. There is a small  tibiotalar joint effusion. There are small tibiotalar marginal osteophytes. There is a corticated ossicle  at the dorsal aspect of the talar neck, likely reflecting sequela of prior  capsular injury. Mild talonavicular marginal osteophytes are present. Mild to  moderate plantar and mild dorsal calcaneal enthesopathy of centimeter corticated  ossicle is also noted in the medial gutter. .    There is a 2.0 x 0.7 cm nonaggressive appearing peripherally based lesion in the  lateral aspect of the distal tibial diaphysis, with narrow zone of transition  and no appreciable periosteal reaction, likely a healed nonossifying fibroma. Soft tissues: There is limited assessment of tendons and musculature and ligaments by CT. The  visualized flexor, extensor, peroneal, and Achilles tendons are grossly intact. Muscle fat planes are preserved. There is suspected high-grade partial or  full-thickness tear of the anterior talofibular ligament. Moderate degree of  edema is present within the anterolateral gutter. No gross evidence of  calcaneofibular, posterior talofibular ligament tears. The syndesmotic ligaments  are grossly intact. No definite superficial deltoid and spring ligament injury. Corticated ossicle in the distribution of the deep deltoid likely sequela of  prior injury. LEFT FOOT:    Osseous structures and joints: There is a subacute mildly displaced, impacted, and comminuted extra-articular  fracture of the fifth metatarsal head-neck junction. There is an 8 mm bone  fragment noted medial to the primary fracture plane. There is mild osseous  blurring adjacent to the fracture margins which is suggestive of early healing. No additional fracture is seen. Alignment is otherwise anatomic. The Lisfranc  articulation is intact. Soft tissues: The assessment of tendons, musculature, and ligaments by CT. The visualized  tendons are grossly intact. Muscle fat planes are preserved. There is mild  diffuse subcutaneous tissues edema. Impression IMPRESSION[de-identified]  1.  Subacute fibular avulsion of the anterior talofibular ligament. Small  tibiotalar effusion and mild fluid in the anterolateral gutter. 2.  Mildly displaced, comminuted, impacted extra-articular fifth metatarsal  head-neck junction fracture. 3.  Nonaggressive appearing distal tibial peripherally based sclerotic lesion,  likely a healed nonossifying fibroma. Please see above for additional details. Please see above section of this report. I have personally reviewed the results of the above study.  The interpretation of this study is my professional opinion. Written by Consuella Angelucci, as dictated by Dr. Charley Dougherty. I, Dr. Charley Dougherty, confirm that all documentation is accurate.

## 2019-12-17 NOTE — PROGRESS NOTES
Verbal order given by Dr. Demarcus Barakat to sign order for tall CAM boot entered by [facility].

## 2019-12-30 ENCOUNTER — TELEPHONE (OUTPATIENT)
Dept: ORTHOPEDIC SURGERY | Age: 29
End: 2019-12-30

## 2019-12-30 DIAGNOSIS — S82.65XD CLOSED NONDISPLACED FRACTURE OF LATERAL MALLEOLUS OF LEFT FIBULA WITH ROUTINE HEALING, SUBSEQUENT ENCOUNTER: ICD-10-CM

## 2019-12-30 DIAGNOSIS — S92.355D CLOSED NONDISPLACED FRACTURE OF FIFTH METATARSAL BONE OF LEFT FOOT WITH ROUTINE HEALING, SUBSEQUENT ENCOUNTER: ICD-10-CM

## 2019-12-30 RX ORDER — HYDROCODONE BITARTRATE AND ACETAMINOPHEN 7.5; 325 MG/1; MG/1
1 TABLET ORAL
Qty: 30 TAB | Refills: 0 | Status: SHIPPED | OUTPATIENT
Start: 2019-12-30 | End: 2019-12-30 | Stop reason: SDUPTHER

## 2019-12-30 RX ORDER — HYDROCODONE BITARTRATE AND ACETAMINOPHEN 7.5; 325 MG/1; MG/1
1 TABLET ORAL
Qty: 30 TAB | Refills: 0 | Status: SHIPPED | OUTPATIENT
Start: 2019-12-30 | End: 2020-01-08

## 2019-12-30 NOTE — TELEPHONE ENCOUNTER
Prescription for the following medication e-prescribed to the patients pharmacy:    Orders Placed This Encounter    HYDROcodone-acetaminophen (NORCO) 7.5-325 mg per tablet     Sig: Take 1 Tab by mouth two (2) times daily as needed for Pain for up to 15 days. Max Daily Amount: 2 Tabs.      Dispense:  30 Tab     Refill:  0           Lynn FridayROGER  12/30/2019  2:36 PM

## 2019-12-30 NOTE — TELEPHONE ENCOUNTER
Last Visit: 12/17/19 with MD Hong  Next Appointment: 1/14/20 with MD Hong  Previous Refill Encounter(s): 12/17/19 #30    Requested Prescriptions     Pending Prescriptions Disp Refills    HYDROcodone-acetaminophen (NORCO) 7.5-325 mg per tablet 30 Tab 0     Sig: Take 1 Tab by mouth two (2) times daily as needed for Pain for up to 15 days. Max Daily Amount: 2 Tabs.

## 2020-01-06 ENCOUNTER — OFFICE VISIT (OUTPATIENT)
Dept: FAMILY MEDICINE CLINIC | Age: 30
End: 2020-01-06

## 2020-01-06 DIAGNOSIS — S82.65XD CLOSED NONDISPLACED FRACTURE OF LATERAL MALLEOLUS OF LEFT FIBULA WITH ROUTINE HEALING, SUBSEQUENT ENCOUNTER: ICD-10-CM

## 2020-01-06 DIAGNOSIS — F90.2 ATTENTION DEFICIT HYPERACTIVITY DISORDER (ADHD), COMBINED TYPE: ICD-10-CM

## 2020-01-06 DIAGNOSIS — S92.355D CLOSED NONDISPLACED FRACTURE OF FIFTH METATARSAL BONE OF LEFT FOOT WITH ROUTINE HEALING, SUBSEQUENT ENCOUNTER: ICD-10-CM

## 2020-01-06 DIAGNOSIS — F41.9 ANXIETY: Primary | ICD-10-CM

## 2020-01-06 RX ORDER — DEXTROAMPHETAMINE SACCHARATE, AMPHETAMINE ASPARTATE, DEXTROAMPHETAMINE SULFATE AND AMPHETAMINE SULFATE 7.5; 7.5; 7.5; 7.5 MG/1; MG/1; MG/1; MG/1
30 TABLET ORAL 2 TIMES DAILY
Qty: 120 TAB | Refills: 0 | Status: SHIPPED | OUTPATIENT
Start: 2020-02-05 | End: 2020-02-24 | Stop reason: SDUPTHER

## 2020-01-06 RX ORDER — LORAZEPAM 0.5 MG/1
0.5 TABLET ORAL
Qty: 60 TAB | Refills: 1 | Status: SHIPPED | OUTPATIENT
Start: 2020-01-06 | End: 2020-02-05

## 2020-01-06 RX ORDER — DEXTROAMPHETAMINE SACCHARATE, AMPHETAMINE ASPARTATE, DEXTROAMPHETAMINE SULFATE AND AMPHETAMINE SULFATE 7.5; 7.5; 7.5; 7.5 MG/1; MG/1; MG/1; MG/1
30 TABLET ORAL 2 TIMES DAILY
Qty: 120 TAB | Refills: 0 | Status: SHIPPED | OUTPATIENT
Start: 2020-01-06 | End: 2020-01-06 | Stop reason: SDUPTHER
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
## 2020-01-07 NOTE — TELEPHONE ENCOUNTER
Patient called and asked if he could have a refill of the HYDROcodone-acetaminophen ([facility]) 7.5-325 mg per tablet Patient stated that he was prescribed a Rx for two weeks but his insurance only allows a week, and asked if rx could be sent to [facility] [place], [address] [place] [address]     Patient also stated that he cant be prescribed a medication with Tylenol inside of it  due to his liver issues.     Patient tel:  [phone]

## 2020-01-08 RX ORDER — MELOXICAM 15 MG/1
TABLET ORAL
Qty: 30 TAB | Refills: 1 | Status: SHIPPED | OUTPATIENT
Start: 2020-01-08 | End: 2020-01-08

## 2020-01-08 RX ORDER — FAMOTIDINE 40 MG/1
40 TABLET, FILM COATED ORAL DAILY
Qty: 30 TAB | Refills: 1 | Status: SHIPPED | OUTPATIENT
Start: 2020-01-08 | End: 2020-01-08

## 2020-01-08 RX ORDER — DICLOFENAC SODIUM 10 MG/G
4 GEL TOPICAL 4 TIMES DAILY
Qty: 100 G | Refills: 1 | Status: SHIPPED | OUTPATIENT
Start: 2020-01-08

## 2020-01-08 NOTE — TELEPHONE ENCOUNTER
Prescription for the following medication e-prescribed to the patients pharmacy:    Orders Placed This Encounter    DISCONTD: HYDROcodone-acetaminophen ([address]) 7.5-325 mg per tablet     Sig: Take 1 Tab by mouth two (2) times daily as needed for Pain for up to 15 days. Max Daily Amount: 2 Tabs. Dispense:  30 Tab     Refill:  0    DISCONTD: HYDROcodone-acetaminophen (NORCO) 7.5-325 mg per tablet     Sig: Take 1 Tab by mouth two (2) times daily as needed for Pain for up to 15 days. Max Daily Amount: 2 Tabs. Dispense:  30 Tab     Refill:  0    meloxicam (MOBIC) 15 mg tablet     Sig: Take one tablet by mouth daily with food     Dispense:  30 Tab     Refill:  1    famotidine (PEPCID) 40 mg tablet     Sig: Take 1 Tab by mouth daily.      Dispense:  30 Tab     Refill:  1           Dang Matias PA-C  1/8/2020  9:18 AM

## 2020-01-08 NOTE — TELEPHONE ENCOUNTER
Patient called back and states per previous call [facility] to [facility] on [address] [place] [address] Street

## 2020-01-08 NOTE — TELEPHONE ENCOUNTER
Patient called again and was advised of prescriptions sent to [facility] - he doesn't want either one of these and requests we cancel them both. He's requesting an alternate medication that may not have side effects with his stomach. Please call new prescription in to [facility] pharmacy on file.

## 2020-01-08 NOTE — TELEPHONE ENCOUNTER
CHAYO for patient to give our office a call back. If patient calls back please inform him that his medication was sent to his pharmacy.

## 2020-01-08 NOTE — TELEPHONE ENCOUNTER
Prescriptions discontinued at patients request. We can provide rx for a topical pain medicine. We will not prscribe any narcotic pain medications. Prescription for the following medication e-prescribed to the patients pharmacy:    Orders Placed This Encounter    DISCONTD: HYDROcodone-acetaminophen ([address]) 7.5-325 mg per tablet     Sig: Take 1 Tab by mouth two (2) times daily as needed for Pain for up to 15 days. Max Daily Amount: 2 Tabs. Dispense:  30 Tab     Refill:  0    DISCONTD: HYDROcodone-acetaminophen (NORCO) 7.5-325 mg per tablet     Sig: Take 1 Tab by mouth two (2) times daily as needed for Pain for up to 15 days. Max Daily Amount: 2 Tabs. Dispense:  30 Tab     Refill:  0    DISCONTD: meloxicam (MOBIC) 15 mg tablet     Sig: Take one tablet by mouth daily with food     Dispense:  30 Tab     Refill:  1    DISCONTD: famotidine (PEPCID) 40 mg tablet     Sig: Take 1 Tab by mouth daily. Dispense:  30 Tab     Refill:  1    diclofenac (VOLTAREN) 1 % gel     Sig: Apply 4 g to affected area four (4) times daily.  USE AS DIRECTED     Dispense:  100 g     Refill:  1           Katherine Julio PA-C  1/8/2020  5:27 PM

## 2020-01-14 ENCOUNTER — OFFICE VISIT (OUTPATIENT)
Dept: ORTHOPEDIC SURGERY | Age: 30
End: 2020-01-14

## 2020-01-14 VITALS
HEIGHT: 73 IN | TEMPERATURE: 96.5 F | OXYGEN SATURATION: 97 % | BODY MASS INDEX: 34.3 KG/M2 | HEART RATE: 92 BPM | RESPIRATION RATE: 16 BRPM | SYSTOLIC BLOOD PRESSURE: 154 MMHG | DIASTOLIC BLOOD PRESSURE: 99 MMHG

## 2020-01-14 DIAGNOSIS — M79.672 LEFT FOOT PAIN: ICD-10-CM

## 2020-01-14 DIAGNOSIS — S93.492A SPRAIN OF ANTERIOR TALOFIBULAR LIGAMENT OF LEFT ANKLE, INITIAL ENCOUNTER: ICD-10-CM

## 2020-01-14 DIAGNOSIS — M25.571 ACUTE RIGHT ANKLE PAIN: ICD-10-CM

## 2020-01-14 DIAGNOSIS — M25.572 ACUTE LEFT ANKLE PAIN: ICD-10-CM

## 2020-01-14 DIAGNOSIS — S92.355D CLOSED NONDISPLACED FRACTURE OF FIFTH METATARSAL BONE OF LEFT FOOT WITH ROUTINE HEALING, SUBSEQUENT ENCOUNTER: ICD-10-CM

## 2020-01-14 DIAGNOSIS — M54.50 LOW BACK PAIN, UNSPECIFIED BACK PAIN LATERALITY, UNSPECIFIED CHRONICITY, UNSPECIFIED WHETHER SCIATICA PRESENT: ICD-10-CM

## 2020-01-14 DIAGNOSIS — M25.562 ACUTE PAIN OF LEFT KNEE: ICD-10-CM

## 2020-01-14 DIAGNOSIS — M25.371 INSTABILITY OF RIGHT ANKLE JOINT: Primary | ICD-10-CM

## 2020-01-14 NOTE — PROGRESS NOTES
1. Have you been to the ER, urgent care clinic since your last visit? Hospitalized since your last visit? No    2. Have you seen or consulted any other health care providers outside of the [address] [place] [address] since your last visit? Include any pap smears or colon screening.  No
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
## 2020-01-18 ENCOUNTER — HOSPITAL ENCOUNTER (OUTPATIENT)
Dept: MRI IMAGING | Age: 30
Discharge: HOME | End: 2020-01-18
Attending: ORTHOPAEDIC SURGERY
Payer: MEDICAID

## 2020-01-18 DIAGNOSIS — M25.571 ACUTE RIGHT ANKLE PAIN: ICD-10-CM

## 2020-01-18 DIAGNOSIS — M25.572 ACUTE LEFT ANKLE PAIN: ICD-10-CM

## 2020-01-18 PROCEDURE — 73721 MRI JNT OF LWR EXTRE W/O DYE: CPT

## 2020-01-21 ENCOUNTER — OFFICE VISIT (OUTPATIENT)
Dept: ORTHOPEDIC SURGERY | Age: 30
End: 2020-01-21

## 2020-01-21 VITALS
TEMPERATURE: 98.2 F | HEART RATE: 77 BPM | OXYGEN SATURATION: 97 % | WEIGHT: 278.2 LBS | BODY MASS INDEX: 36.87 KG/M2 | SYSTOLIC BLOOD PRESSURE: 148 MMHG | HEIGHT: 73 IN | DIASTOLIC BLOOD PRESSURE: 92 MMHG | RESPIRATION RATE: 14 BRPM

## 2020-01-21 DIAGNOSIS — S93.492A SPRAIN OF ANTERIOR TALOFIBULAR LIGAMENT OF LEFT ANKLE, INITIAL ENCOUNTER: ICD-10-CM

## 2020-01-21 DIAGNOSIS — S93.401D SPRAIN OF RIGHT ANKLE, UNSPECIFIED LIGAMENT, SUBSEQUENT ENCOUNTER: ICD-10-CM

## 2020-01-21 DIAGNOSIS — S92.355D CLOSED NONDISPLACED FRACTURE OF FIFTH METATARSAL BONE OF LEFT FOOT WITH ROUTINE HEALING, SUBSEQUENT ENCOUNTER: Primary | ICD-10-CM

## 2020-01-21 RX ORDER — ERGOCALCIFEROL 1.25 MG/1
CAPSULE ORAL
Qty: 4 CAP | Refills: 0 | Status: SHIPPED | OUTPATIENT
Start: 2020-01-21 | End: 2020-01-21
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
## 2020-01-21 NOTE — PROGRESS NOTES
1. Have you been to the ER, urgent care clinic since your last visit? Hospitalized since your last visit? No    2. Have you seen or consulted any other health care providers outside of the [address] [place] [address] since your last visit? Include any pap smears or colon screening.  No

## 2020-01-24 ENCOUNTER — HOSPITAL ENCOUNTER (OUTPATIENT)
Dept: PHYSICAL THERAPY | Age: 30
Discharge: HOME | End: 2020-01-24
Payer: MEDICAID

## 2020-01-24 PROCEDURE — 97110 THERAPEUTIC EXERCISES: CPT

## 2020-01-24 PROCEDURE — 97161 PT EVAL LOW COMPLEX 20 MIN: CPT

## 2020-01-24 PROCEDURE — 97535 SELF CARE MNGMENT TRAINING: CPT
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
## 2020-01-24 NOTE — PROGRESS NOTES
PT DAILY TREATMENT NOTE  10-18    Patient Name: Colleen Romero  Date:2020  : 1990  [x]  Patient  Verified  Payor: [facility] MEDICAID / Plan: [facility] [employer] COMMUNITY PLAN [facility] / Product Type: Managed Care Medicaid /    In time: 1:06  Out time:1:58  Total Treatment Time (min): 52  Visit #: 1 of 10    Treatment Area: Right ankle pain [M25.571]  Sprain of unspecified ligament of right ankle, subsequent encounter [C53.132D]    SUBJECTIVE  Pain Level (0-10 scale): 4  Any medication changes, allergies to medications, adverse drug reactions, diagnosis change, or new procedure performed?: [x] No    [] Yes (see summary sheet for update)  Subjective functional status/changes:   [] No changes reported  See POC    OBJECTIVE    28 min [x]Eval                  []Re-Eval     10 min Therapeutic Exercise:  [] See flow sheet : HEP instruction and demonstration   Rationale: increase ROM and increase strength to improve the patients ability to tolerate ADLs    10 min Self Care/Home Management: []  See flow sheet : pt education regarding anatomy and physiology of the LEs and how it relates to the pt's condition, pt education on continuing to follow restrictions from the MD regarding lifting and weight bearing. Rationale: increase ROM, increase strength and decrease pain/symptoms  to improve the patients ability to tolerate functional tasks. 4 min Manual Therapy:  Right LE pull/distraction to correct right innominate upslip   Rationale: decrease pain, increase ROM and increase tissue extensibility to improve activity tolerance. With   [x] TE   [] TA   [] neuro   [x] Other: Self Care/Home management and manual therapy Patient Education: [x] Review HEP    [] Progressed/Changed HEP based on:   [] positioning   [] body mechanics   [] transfers   [] heat/ice application    [] other:      Other Objective/Functional Measures: See evaluation.      Pain Level (0-10 scale) post treatment: 4    ASSESSMENT/Changes in Function: Pt given HEP handout to perform. Pt understood exercises in HEP handout. Pt demonstrated decreased AROM, decreased strength, muscle tightness, mild impaired pelvic alignment, and impaired gait. Tenderness and tightness noted to the right gastroc/soleus complex with palpation. Pt would benefit from physical therapy to improve the above impairments to help the pt return to performing ADLs, functional and work activities.      Patient will continue to benefit from skilled PT services to modify and progress therapeutic interventions, address functional mobility deficits, address ROM deficits, address strength deficits, analyze and address soft tissue restrictions, analyze and cue movement patterns, analyze and modify body mechanics/ergonomics, assess and modify postural abnormalities, address imbalance/dizziness and instruct in home and community integration to attain remaining goals. [x]  See Plan of Care  []  See progress note/recertification  []  See Discharge Summary         Progress towards goals / Updated goals:  Short Term Goals: To be accomplished in 2 treatments:  1. Pt will report compliance and independence to HEP to help the pt manage their pain and symptoms. Eval: established   Long Term Goals: To be accomplished in 10 treatments:  1. Pt will increase FOTO score to 62 points to improve ability to perform ADLs. Eval: 44 points  2. Pt will report an improvement in at worst pain to 6/10 to improve ability to ambulate community distances with more ease. Eval: 10/10 at worst  3. Pt will increase AROM right ankle DF to 5 degs, PF to 55 degs, INV to 35 degs to improve ability to tolerate work activities. Eval: ankle DF 0 degs, PF 42 degs, INV 21 degs  4. Pt will report being able to walk 2 block with no difficulty secondary to right foot/ankle pain to improve ability to perform functional activities.    Eval: quite a bit of difficulty per FOTO    PLAN  [x]  Upgrade activities as tolerated     [x] Continue plan of care  [x]  Update interventions per flow sheet       []  Discharge due to:_  []  Other:_      Aditi Waite, PT 1/24/2020  2:40 PM    Future Appointments   Date Time Provider Lisandro Hu   1/24/2020  1:00 PM Aspen Jamison [facility]   2/6/2020  8:20 AM Davie Glaser  [address]   2/11/2020  1:20 PM Lewis Joe MD [facility]

## 2020-01-31 ENCOUNTER — HOSPITAL ENCOUNTER (OUTPATIENT)
Dept: PHYSICAL THERAPY | Age: 30
Discharge: HOME | End: 2020-01-31
Payer: MEDICAID

## 2020-01-31 PROCEDURE — 97110 THERAPEUTIC EXERCISES: CPT

## 2020-01-31 NOTE — PROGRESS NOTES
PT DAILY TREATMENT NOTE 10-18    Patient Name: Ramón Pi  Date:2020  : 1990  [x]  Patient  Verified  Payor: [facility] MEDICAID / Plan: [facility] COMMUNITY PLAN [facility] / Product Type: Managed Care Medicaid /    In time:1001  Out time:1031  Total Treatment Time (min): 30  Visit #: 2 of 10    Treatment Area: Right ankle pain [M25.571]  Sprain of unspecified ligament of right ankle, subsequent encounter [S96.623D]    SUBJECTIVE  Pain Level (0-10 scale): 4-5/10  Any medication changes, allergies to medications, adverse drug reactions, diagnosis change, or new procedure performed?: [x] No    [] Yes (see summary sheet for update)  Subjective functional status/changes:   [] No changes reported  Pt stated that he is doing ok today. Reported that he ordered a heel lift and is expecting it any day. OBJECTIVE    30 min Therapeutic Exercise:  [x] See flow sheet :   Rationale: increase ROM and increase strength to improve the patients ability to increase ease with walking    With   [x] TE   [] TA   [] neuro   [] other: Patient Education: [x] Review HEP    [] Progressed/Changed HEP based on:   [] positioning   [] body mechanics   [] transfers   [] heat/ice application    [] other:      Other Objective/Functional Measures:   Had no difficulty with exercises  Pt did not report increased pain with any of the exercises, but had increased pain at end of session  Has limited EV of right ankle     Pain Level (0-10 scale) post treatment: 6-7/10    ASSESSMENT/Changes in Function:   Initiated therex today per flow sheet. Pt reported compliance with HEP.  Pt put forth good effort with all exercises    Patient will continue to benefit from skilled PT services to modify and progress therapeutic interventions, address functional mobility deficits, address ROM deficits, address strength deficits, analyze and cue movement patterns, analyze and modify body mechanics/ergonomics and instruct in home and community integration to attain remaining goals. [x]  See Plan of Care  []  See progress note/recertification  []  See Discharge Summary         Progress towards goals / Updated goals:  Short Term Goals: To be accomplished in 2 treatments:  1. Pt will report compliance and independence to HEP to help the pt manage their pain and symptoms. Goal met. 1/31/2020     Long Term Goals: To be accomplished in 10 treatments:  1. Pt will increase FOTO score to 62 points to improve ability to perform ADLs. Eval: 44 points  2. Pt will report an improvement in at worst pain to 6/10 to improve ability to ambulate community distances with more ease. Eval: 10/10 at worst  3. Pt will increase AROM right ankle DF to 5 degs, PF to 55 degs, INV to 35 degs to improve ability to tolerate work activities. Eval: ankle DF 0 degs, PF 42 degs, INV 21 degs  4. Pt will report being able to walk 2 block with no difficulty secondary to right foot/ankle pain to improve ability to perform functional activities.    Eval: quite a bit of difficulty per FOTO    PLAN  []  Upgrade activities as tolerated     [x]  Continue plan of care  []  Update interventions per flow sheet       []  Discharge due to:_  []  Other:_      Jerrica Ruvalcaba PTA 1/31/2020  10:07 AM    Future Appointments   Date Time Provider Lisandro Mayte   2/4/2020 12:30 PM Ciaran Colby [facility]   2/6/2020  8:20 AM Dread Ramirez  [address]   2/7/2020 12:30 PM Pito Tobias PTA [facility]   2/11/2020  9:30 AM Pito Tobias PTA [facility]   2/11/2020  1:20 PM Olinda Griggs MD [facility]   2/14/2020 11:00 AM David Garcia PT [facility]   2/18/2020  1:00 PM Pito Tobias PTA [facility]   2/20/2020 10:00 AM Brady Louise PT [facility]   2/24/2020 12:30 PM Pito Tobias, PTA [facility]   2/26/2020 11:30 AM Brady Louise, PT [facility]

## 2020-02-04 ENCOUNTER — HOSPITAL ENCOUNTER (OUTPATIENT)
Dept: PHYSICAL THERAPY | Age: 30
Discharge: HOME | End: 2020-02-04
Payer: MEDICAID

## 2020-02-04 PROCEDURE — 97110 THERAPEUTIC EXERCISES: CPT

## 2020-02-04 NOTE — PROGRESS NOTES
PT DAILY TREATMENT NOTE 10-18    Patient Name: Eduarda Gamble  Date:2020  : 1990  [x]  Patient  Verified  Payor: [employer] / Plan: [facility] COMMUNITY PLAN [facility] / Product Type: Managed Care Medicaid /    In time:143  Out time:210  Total Treatment Time (min): 27  Visit #: 3 of 10    Treatment Area: Right ankle pain [M25.571]  Sprain of unspecified ligament of right ankle, subsequent encounter [S98.108D]    SUBJECTIVE  Pain Level (0-10 scale): 4/10  Any medication changes, allergies to medications, adverse drug reactions, diagnosis change, or new procedure performed?: [x] No    [] Yes (see summary sheet for update)  Subjective functional status/changes:   [] No changes reported  Pt stated that he heard a pop in his ankle last night as he was running after his pig. Reported that he had pain for about 5 minutes, but now has increased range of motion in the ankle and no added pain    OBJECTIVE    27 min Therapeutic Exercise:  [x] See flow sheet :   Rationale: increase ROM and increase strength to improve the patients ability to increase ease with ADLs    With   [x] TE   [] TA   [] neuro   [] other: Patient Education: [x] Review HEP    [] Progressed/Changed HEP based on:   [] positioning   [] body mechanics   [] transfers   [] heat/ice application    [] other:      Other Objective/Functional Measures:   Had no difficulty with exercises  Reported burning in the left foot with standing exercises  Has slight difficulty with bending toes on the right foot     Pain Level (0-10 scale) post treatment: 4-5/10    ASSESSMENT/Changes in Function:   Pt is progressing slowly toward goals. Pt cont with increased instability in the right ankle. Cont with moderate pain in the ankle and low back. Pt cont to have significant limp due to wearing boot on the left foot. Pt stated that his heel lift should be her today or tomorrow.      Patient will continue to benefit from skilled PT services to modify and progress therapeutic interventions, address functional mobility deficits, address ROM deficits, address strength deficits, analyze and cue movement patterns, analyze and modify body mechanics/ergonomics, assess and modify postural abnormalities and instruct in home and community integration to attain remaining goals. [x]  See Plan of Care  []  See progress note/recertification  []  See Discharge Summary         Progress towards goals / Updated goals:  Short Term Goals: To be accomplished in 2 treatments:  1. Pt will report compliance and independence to [facility] to help the pt manage their pain and symptoms.             Goal met. 1/31/2020     Long Term Goals: To be accomplished in 10 treatments:  1. Pt will increase FOTO score to 62 points to improve ability to perform ADLs. Eval: 44 points  2. Pt will report an improvement in at worst pain to 6/10 to improve ability to ambulate community distances with more ease. Progressing. Had 4/10 pain today. 2/4/20  3. Pt will increase AROM right ankle DF to 5 degs, PF to 55 degs, INV to 35 degs to improve ability to tolerate work activities. Eval: ankle DF 0 degs, PF 42 degs, INV 21 degs  4.  Pt will report being able to walk 2 block with no difficulty secondary to right foot/ankle pain to improve ability to perform functional activities.   Eval: quite a bit of difficulty per FOTO    PLAN  []  Upgrade activities as tolerated     [x]  Continue plan of care  []  Update interventions per flow sheet       []  Discharge due to:_  []  Other:_      Ebony Oglesby, TAYLOR 2/4/2020  2:01 PM    Future Appointments   Date Time Provider [facility]   2/6/2020  8:20 AM Delgado Peoples  [address]    2/7/2020 12:30 PM Kenneth Hof, PTA [facility]   2/11/2020  9:30 AM Kenneth Hof, PTA [facility]   2/11/2020  1:20 PM Sheila Rojas MD [place] [id number]   2/14/2020 11:00 AM Becki Reyes, PT [facility]   2/18/2020  1:00 PM Kenneth Hof, PTA [facility] 2/20/2020 10:00 AM Cristino Broderick, PT [facility]   2/24/2020 12:30 PM Luz Elena Jordan, TAYLOR [facility]   2/26/2020 11:30 AM Cristino Broderick, PT [facility]

## 2020-02-05 ENCOUNTER — HOSPITAL ENCOUNTER (EMERGENCY)
Age: 30
Discharge: HOME | End: 2020-02-05
Attending: EMERGENCY MEDICINE
Payer: MEDICAID

## 2020-02-05 VITALS
SYSTOLIC BLOOD PRESSURE: 176 MMHG | RESPIRATION RATE: 20 BRPM | OXYGEN SATURATION: 98 % | HEART RATE: 92 BPM | TEMPERATURE: 98.3 F | DIASTOLIC BLOOD PRESSURE: 109 MMHG

## 2020-02-05 DIAGNOSIS — F90.9 ATTENTION DEFICIT HYPERACTIVITY DISORDER (ADHD), UNSPECIFIED ADHD TYPE: Primary | ICD-10-CM

## 2020-02-05 DIAGNOSIS — Z76.0 MEDICATION REFILL: ICD-10-CM

## 2020-02-05 DIAGNOSIS — I10 ESSENTIAL HYPERTENSION: ICD-10-CM

## 2020-02-05 PROCEDURE — 99282 EMERGENCY DEPT VISIT SF MDM: CPT

## 2020-02-05 RX ORDER — LISINOPRIL 10 MG/1
10 TABLET ORAL DAILY
Qty: 30 TAB | Refills: 0 | Status: SHIPPED | OUTPATIENT
Start: 2020-02-05 | End: 2020-02-10 | Stop reason: ALTCHOICE

## 2020-02-05 RX ORDER — DEXTROAMPHETAMINE SACCHARATE, AMPHETAMINE ASPARTATE, DEXTROAMPHETAMINE SULFATE AND AMPHETAMINE SULFATE 7.5; 7.5; 7.5; 7.5 MG/1; MG/1; MG/1; MG/1
30 TABLET ORAL 2 TIMES DAILY
Qty: 28 TAB | Refills: 0 | Status: SHIPPED | OUTPATIENT
Start: 2020-02-05 | End: 2020-02-24 | Stop reason: SDUPTHER

## 2020-02-05 NOTE — ED TRIAGE NOTES
Pt reports medication refill on Adderall 30 mg BID.  \" I have insurance and [facility] will not refill prescription

## 2020-02-05 NOTE — ED NOTES
was paged by doctor regarding patient needing assistance with prescription, PCP and insurance.  assessed patient. Patient stated he was insured he just needed a doctor's authorization for his medications for insurance purposes. Patient stated his doctor is no longer available at [facility] to make authorization.  confirmed through registrar patient is insured.  was able to have patient scheduled to see a new physician so that his medications maybe authorized.  was able to get patient scheduled at [facility] on 2/10/2020 at 3:30 pm with Renetta Moreland NP.  handed patient appointment reminder letter with 's contact information.

## 2020-02-05 NOTE — DISCHARGE INSTRUCTIONS
Patient Education        Attention Deficit Hyperactivity Disorder (ADHD) in Adults: Care Instructions  Your Care Instructions    Attention deficit hyperactivity disorder, or ADHD, is a condition that makes it hard to pay attention. So you may have problems when you try to focus, get organized, and finish tasks. It might make you more active than other people. Or you might do things without thinking first.  ADHD is very common. It usually starts in early childhood. Many adults don't realize they have it until their children are diagnosed. Then they become aware of their own symptoms. Doctors don't know what causes ADHD. But it often runs in families. ADHD can be treated with medicines, behavior training, and counseling. Treatment can improve your life. Follow-up care is a key part of your treatment and safety. Be sure to make and go to all appointments, and call your doctor if you are having problems. It's also a good idea to know your test results and keep a list of the medicines you take. How can you care for yourself at home? · Learn all you can about ADHD. This will help you and your family understand it better. · Take your medicines exactly as prescribed. Call your doctor if you think you are having a problem with your medicine. You will get more details on the specific medicines your doctor prescribes. · If you miss a dose of your medicine, do not take an extra dose. · If your doctor suggests counseling, find a counselor you like and trust. Talk openly and honestly. Be willing to make some changes. · Find a support group for adults with ADHD. Talking to others with the same problems can help you feel better. It can also give you ideas about how to best cope with the condition. · Get rid of distractions at your work space. Keep your desk clean. Try not to face a window or busy hallway. · Use files, planners, and other tools to keep you organized. · Limit use of alcohol, and do not use illegal drugs. People with ADHD tend to develop substance use disorder more easily than others. Tell your doctor if you need help to quit. Counseling, support groups, and sometimes medicines can help you stay free of alcohol or drugs. · Get at least 30 minutes of physical activity on most days of the week. Exercise has been shown to help people cope with ADHD. Walking is a good choice. You also may want to do other activities, such as running, swimming, cycling, or playing tennis or team sports. When should you call for help? Watch closely for changes in your health, and be sure to contact your doctor if:    · You feel sad a lot or cry all the time.     · You have trouble sleeping, or you sleep too much.     · You find it hard to concentrate, make decisions, or remember things.     · You change how you normally eat.     · You feel guilty for no reason. Where can you learn more? Go to http://selina-amrit.info/. Enter B196 in the search box to learn more about \"Attention Deficit Hyperactivity Disorder (ADHD) in Adults: Care Instructions. \"  Current as of: May 28, 2019  Content Version: 12.2  © 8333-4158 [employer], [employer]. Care instructions adapted under license by [employer] (which disclaims liability or warranty for this information). If you have questions about a medical condition or this instruction, always ask your healthcare professional. [place] [address] any warranty or liability for your use of this information. Patient Education        Learning About Stimulant Medicines for Attention Deficit Hyperactivity Disorder (ADHD)  How are stimulant medicines used to treat ADHD? Stimulant medicines affect certain chemicals in the brain. They can help a person with ADHD to focus better. And they can make the person less hyperactive and impulsive. ADHD is treated with medicines and behavior therapy.  Stimulants are the medicines used most.  What are some types of these medicines? Stimulant medicines may include:  · Dexmethylphenidate (Focalin). · Lisdexamfetamine (Vyvanse). · Methylphenidate (Concerta, Daytrana, Metadate CD, Methylin, Ritalin). · Mixed salts amphetamine (Adderall). How do you take them safely? · Take your medicines exactly as prescribed. Call your doctor if you think you are having a problem with your medicine. You will get more details on the specific medicines your doctor prescribes. · Do not take \"make-up\" doses. If you miss a dose, and if it's not too late in the day, it's okay to take it. But don't double up doses. · Do not misuse your medicine. Some medicines for ADHD can be misused. Some people may take a larger dose than prescribed. They may take them for their non-medical effects. Or they may share or sell them. Misuse can lead to a stimulant use disorder. · Keep close track of your medicine. Don't sell or give medicine to other people. What are the side effects? Common side effects include loss of appetite, a headache, and an upset stomach. You may also have mood changes or sleep problems. Or you may feel nervous. Some stimulant medicines can cause a dry mouth. If these medicines have bothersome side effects or don't work for you, your doctor might prescribe another type of medicine. How long can you expect to use these medicines? Most doctors prescribe a low dose of stimulant medicines at first. Your doctor may have you slowly increase the dose until your symptoms are managed. Or you might get a different medicine or treatment. This can take several weeks. Some doctors may advise taking a break from the medicine over some weekends or during holidays. But this depends on the type of symptoms you have. You may need to take medicine for ADHD for a long time. But your doctor will check now and then to see if you can take a lower dose and still get the benefits of the medicine.   If you want to stop or reduce your use of the medicine, talk to your doctor first. Some signs that you may be able to lower or stop your dose include:  · You have no symptoms for more than a year while you take the medicine. · You are doing better at the same dose. · Your behavior is appropriate even if you miss a dose or two. · You are newly able to concentrate. Follow-up care is a key part of your treatment and safety. Be sure to make and go to all appointments, and call your doctor if you are having problems. It's also a good idea to know your test results and keep a list of the medicines you take. Where can you learn more? Go to http://selina-amrit.info/. Enter S155 in the search box to learn more about \"Learning About Stimulant Medicines for Attention Deficit Hyperactivity Disorder (ADHD). \"  Current as of: May 28, 2019  Content Version: 12.2  © 8263-8442 [employer]. Care instructions adapted under license by [employer] (which disclaims liability or warranty for this information). If you have questions about a medical condition or this instruction, always ask your healthcare professional. [address] any warranty or liability for your use of this information.

## 2020-02-05 NOTE — ED PROVIDER NOTES
EMERGENCY DEPARTMENT HISTORY AND PHYSICAL EXAM    Date: 2/5/2020  Patient Name: Shanna Rebolledo    History of Presenting Illness     No chief complaint on file. History Provided By: Patient    Additional History (Context): Shanna Rebloledo is a 34 y.o. male with hypertension and ADHD who presents with request for prescription of his Adderall. He takes 30 mg twice daily and has been out for 2-1/2 days. [facility] but now since he has Medicaid, he needs a new primary care physician as well as a new prescription for his Adderall. His current prescription is invalid because it requires a preauthorization but because he is no longer under the care of the [facility], the pharmacy cannot speak with the physician there for preauthorization. Patient denies any SI HI. He notes his blood pressure is been increasing with increasing Adderall doses. He is amenable to returning on blood pressure medication as his blood pressure is increasing. Denies any chest pain shortness of breath lightheaded dizziness numbness weakness. PCP: Other, MD Melissa    Current Facility-Administered Medications   Medication Dose Route Frequency Provider Last Rate Last Dose    dextroamphetamine-amphetamine (ADDERALL) tablet 30 mg  30 mg Oral NOW Emelina Mccartney PA         Current Outpatient Medications   Medication Sig Dispense Refill    dextroamphetamine-amphetamine (ADDERALL) 30 mg tablet Take 1 Tab by mouth two (2) times a day for 14 days. Max Daily Amount: 2 Tabs. 28 Tab 0    lisinopril (PRINIVIL) 10 mg tablet Take 1 Tab by mouth daily for 30 days. 30 Tab 0    calcium carbonate-vitamin D3 (CALTRATE 600 PLUS D) 600 mg (1,500 mg)-800 unit chew Take 1 Tab by mouth daily. 30 Tab 1    diclofenac (VOLTAREN) 1 % gel Apply 4 g to affected area four (4) times daily. USE AS DIRECTED 100 g 1    dextroamphetamine-amphetamine (ADDERALL) 30 mg tablet Take 1 Tab by mouth two (2) times a day for 28 days.  Max Daily Amount: 2 Tabs. Indications: Attention Deficit Disorder with Hyperactivity 120 Tab 0    LORazepam (ATIVAN) 0.5 mg tablet Take 1 Tab by mouth two (2) times daily as needed for Anxiety for up to 30 days. Max Daily Amount: 1 mg. Indications: anxious 60 Tab 1    ondansetron hcl (ZOFRAN) 4 mg tablet Take 1 Tab by mouth every eight (8) hours as needed for Nausea. 20 Tab 0    albuterol (PROVENTIL HFA, VENTOLIN HFA, PROAIR HFA) 90 mcg/actuation inhaler Take 1-2 Puffs by inhalation every four (4) hours as needed for Wheezing. 1 Inhaler 0       Past History     Past Medical History:  Past Medical History:   Diagnosis Date    ADD (attention deficit disorder)     Back injury     Back problem     Bipolar affective disorder ([facility] [address]     Depression     Fatty liver     Fractures     3 slip disk;     History of ear infections     Hypertension     Positive urine drug screen 10/25/2018    +cocaine and thc     Suicidal ideation        Past Surgical History:  Past Surgical History:   Procedure Laterality Date    HX TYMPANOSTOMY         Family History:  Family History   Problem Relation Age of Onset    Cancer Father         mesothelioma    Heart Disease Maternal Grandmother     Diabetes Maternal Grandmother        Social History:  Social History     Tobacco Use    Smoking status: Current Every Day Smoker     Packs/day: 1.00     Years: 12.00     Pack years: 12.00    Smokeless tobacco: Current User   Substance Use Topics    Alcohol use: Yes     Alcohol/week: 6.7 standard drinks     Types: 8 Cans of beer per week     Frequency: Monthly or less     Drinks per session: 5 or 6     Binge frequency: Less than monthly     Comment: social    Drug use: Yes     Types: Marijuana       Allergies: Allergies   Allergen Reactions    Amoxicillin Swelling         Review of Systems   Review of Systems   Respiratory: Negative for shortness of breath. Cardiovascular: Negative for chest pain.    Psychiatric/Behavioral: Positive for agitation and behavioral problems. Negative for suicidal ideas. The patient is not nervous/anxious. All Other Systems Negative  Physical Exam     Vitals:    02/05/20 1429   BP: (!) 176/109   Pulse: 92   Resp: 20   Temp: 98.3 °F (36.8 °C)   SpO2: 98%     Physical Exam  Vitals signs and nursing note reviewed. Constitutional:       General: He is not in acute distress. Appearance: He is well-developed. He is not ill-appearing, toxic-appearing or diaphoretic. HENT:      Head: Normocephalic and atraumatic. Neck:      Musculoskeletal: Normal range of motion and neck supple. Thyroid: No thyromegaly. Vascular: No carotid bruit. Trachea: No tracheal deviation. Cardiovascular:      Rate and Rhythm: Normal rate and regular rhythm. Heart sounds: Normal heart sounds. No murmur. No friction rub. No gallop. Pulmonary:      Effort: Pulmonary effort is normal. No respiratory distress. Breath sounds: Normal breath sounds. No stridor. No wheezing or rales. Chest:      Chest wall: No tenderness. Abdominal:      General: There is no distension. Palpations: Abdomen is soft. There is no mass. Tenderness: There is no abdominal tenderness. There is no guarding or rebound. Musculoskeletal: Normal range of motion. Skin:     General: Skin is warm and dry. Coloration: Skin is not pale. Neurological:      Mental Status: He is alert. Psychiatric:         Speech: Speech normal.         Behavior: Behavior normal.         Thought Content: Thought content normal.         Judgment: Judgment normal.          Diagnostic Study Results     Labs -   No results found for this or any previous visit (from the past 12 hour(s)). Radiologic Studies -   No orders to display     CT Results  (Last 48 hours)    None        CXR Results  (Last 48 hours)    None            Medical Decision Making   I am the first provider for this patient.     I reviewed the vital signs, available nursing notes, past medical history, past surgical history, family history and social history. Vital Signs-Reviewed the patient's vital signs. Records Reviewed: Nursing Notes and Old Medical Records    Procedures:  Procedures    Provider Notes (Medical Decision Making):   Patient used to be on lisinopril. He was counseled on increasing doses of his elbow Adderall medication. He has been no at 30 mg twice daily now for multiple months and office visits. We will renew and have him seen by  to give him a new primary care provider but have just learned that that will not be for another month. MED RECONCILIATION:  Current Facility-Administered Medications   Medication Dose Route Frequency    dextroamphetamine-amphetamine (ADDERALL) tablet 30 mg  30 mg Oral NOW     Current Outpatient Medications   Medication Sig    dextroamphetamine-amphetamine (ADDERALL) 30 mg tablet Take 1 Tab by mouth two (2) times a day for 14 days. Max Daily Amount: 2 Tabs.  lisinopril (PRINIVIL) 10 mg tablet Take 1 Tab by mouth daily for 30 days.  calcium carbonate-vitamin D3 (CALTRATE 600 PLUS D) 600 mg (1,500 mg)-800 unit chew Take 1 Tab by mouth daily.  diclofenac (VOLTAREN) 1 % gel Apply 4 g to affected area four (4) times daily. USE AS DIRECTED    dextroamphetamine-amphetamine (ADDERALL) 30 mg tablet Take 1 Tab by mouth two (2) times a day for 28 days. Max Daily Amount: 2 Tabs. Indications: Attention Deficit Disorder with Hyperactivity    LORazepam (ATIVAN) 0.5 mg tablet Take 1 Tab by mouth two (2) times daily as needed for Anxiety for up to 30 days. Max Daily Amount: 1 mg. Indications: anxious    ondansetron hcl (ZOFRAN) 4 mg tablet Take 1 Tab by mouth every eight (8) hours as needed for Nausea.  albuterol (PROVENTIL HFA, VENTOLIN HFA, PROAIR HFA) 90 mcg/actuation inhaler Take 1-2 Puffs by inhalation every four (4) hours as needed for Wheezing.        Disposition:  home    DISCHARGE NOTE:   2:59 PM    Pt has been reexamined. Patient has no new complaints, changes, or physical findings. Care plan outlined and precautions discussed. Results of  were reviewed with the patient. All medications were reviewed with the patient; will d/c home with lisinopril, adderall. All of pt's questions and concerns were addressed. Patient was instructed and agrees to follow up with PCP, as well as to return to the ED upon further deterioration. Patient is ready to go home. Follow-up Information     Follow up With Specialties Details Why [address] Garry Peck  Schedule an appointment as soon as possible for a visit in 1 day  Adrianna [address] Bhumi Ying MD Internal Medicine Schedule an appointment as soon as possible for a visit in 1 day  [address] [facility] [address] [facility]      [facility] EMERGENCY DEPT Emergency Medicine  If symptoms worsen return immediately [address]  [phone]          Current Discharge Medication List      START taking these medications    Details   !! dextroamphetamine-amphetamine (ADDERALL) 30 mg tablet Take 1 Tab by mouth two (2) times a day for 14 days. Max Daily Amount: 2 Tabs. Qty: 28 Tab, Refills: 0    Associated Diagnoses: Attention deficit hyperactivity disorder (ADHD), unspecified ADHD type      lisinopril (PRINIVIL) 10 mg tablet Take 1 Tab by mouth daily for 30 days. Qty: 30 Tab, Refills: 0       !! - Potential duplicate medications found. Please discuss with provider. CONTINUE these medications which have NOT CHANGED    Details   !! dextroamphetamine-amphetamine (ADDERALL) 30 mg tablet Take 1 Tab by mouth two (2) times a day for 28 days. Max Daily Amount: 2 Tabs.  Indications: Attention Deficit Disorder with Hyperactivity  Qty: 120 Tab, Refills: 0    Associated Diagnoses: Attention deficit hyperactivity disorder (ADHD), combined type       !! - Potential duplicate medications found. Please discuss with provider. Diagnosis     Clinical Impression:   1. Attention deficit hyperactivity disorder (ADHD), unspecified ADHD type    2. Medication refill    3.  Essential hypertension

## 2020-02-07 ENCOUNTER — HOSPITAL ENCOUNTER (OUTPATIENT)
Dept: PHYSICAL THERAPY | Age: 30
Discharge: HOME | End: 2020-02-07
Payer: MEDICAID

## 2020-02-07 PROCEDURE — 97110 THERAPEUTIC EXERCISES: CPT

## 2020-02-07 PROCEDURE — 97140 MANUAL THERAPY 1/> REGIONS: CPT

## 2020-02-07 NOTE — PROGRESS NOTES
PT DAILY TREATMENT NOTE 10-18    Patient Name: Romana Ammon  Date:2020  : 1990  [x]  Patient  Verified  Payor: [facility] MEDICAID / Plan: [employer] / Product Type: Managed Care Medicaid /    In time: 12:33  Out time: 1:15  Total Treatment Time (min): 42  Visit #: 4 of 10    Treatment Area: Right ankle pain [M25.571]  Sprain of unspecified ligament of right ankle, subsequent encounter [H23.188D]    SUBJECTIVE  Pain Level (0-10 scale): 4-5/10  Any medication changes, allergies to medications, adverse drug reactions, diagnosis change, or new procedure performed?: [x] No    [] Yes (see summary sheet for update)  Subjective functional status/changes:   [] No changes reported  Pt reports he hasn't got a heel lift yet and had to reschedule his MD appt for his back. He notes his low back has been bothering him more since wearing a boot. He notes laxity and flexibility in all his joints.      OBJECTIVE    32 min Therapeutic Exercise:  [x] See flow sheet :   Rationale: increase ROM and increase strength to improve the patients ability to increase ease with ADLs    10 minutes to address pelvic alignment to correct MET for left upslip; MET for right AI; shotgun technique; MET for left/left sacral rotation and L2-L5 rotation for ease of ambulation    With   [x] TE   [] TA   [] neuro   [] other: Patient Education: [x] Review HEP    [] Progressed/Changed HEP based on:   [] positioning   [] body mechanics   [] transfers   [] heat/ice application    [] other:      Other Objective/Functional Measures:   Application of heel lift for the right shoe to even leg lengths  Decreased strength with SLR x 4  Provided with GTB and BTB for HEP  Educated on modified planks to work on core stability  Increased hyperextension of the l/s and genu recurvatum bilaterally  Educated on stick massager to the calf and hamstring    Pain Level (0-10 scale) post treatment: 4-5/10    ASSESSMENT/Changes in Function: Pt progressing with stability and flexibility exercises. He has increased laxity and hypermobility of his knees and l/s. He has poor core endurance and weak hip stabilizers and a pelvic obliquity due to CAM boot wear without heel lift. Will work on stability and strengthening to decrease pain and reinjury to allow him to play with his 3and 1year old children. Progress towards goals / Updated goals:  Short Term Goals: To be accomplished in 2 treatments:  1. Pt will report compliance and independence to [facility] to help the pt manage their pain and symptoms.             Goal met. 1/31/2020   Long Term Goals: To be accomplished in 10 treatments:  1. Pt will increase FOTO score to 62 points to improve ability to perform ADLs. Eval: 44 points  2. Pt will report an improvement in at worst pain to 6/10 to improve ability to ambulate community distances with more ease. Progressing. Had 4/10 pain today. 2/4/20  3. Pt will increase AROM right ankle DF to 5 degs, PF to 55 degs, INV to 35 degs to improve ability to tolerate work activities. Eval: ankle DF 0 degs, PF 42 degs, INV 21 degs  4.  Pt will report being able to walk 2 block with no difficulty secondary to right foot/ankle pain to improve ability to perform functional activities.   Eval: quite a bit of difficulty per FOTO    PLAN  [x]  Upgrade activities as tolerated     [x]  Continue plan of care  []  Update interventions per flow sheet       []  Discharge due to:_  []  Other:_      Eli Brito PTA 2/7/2020  2:01 PM    Future Appointments   Date Time Provider Lisandro Bhagati   2/7/2020 12:30 PM Keyonna Alert [facility]   2/10/2020  3:30 PM Victorio Homans, NP [facility] None   2/11/2020  9:30 AM Kenya Porter PTA [facility]   2/11/2020  1:20 PM MD Corky Rodas 69   2/14/2020 11:00 AM Anum Schneider PT [facility]   2/18/2020  1:00 PM Kenya Porter PTA [facility]   2/20/2020 10:00 AM Teena Saunders, PT [facility] 2/24/2020 12:30 PM Marlon Us PTA [facility]   2/26/2020 11:30 AM Vamsi Kumar PT [facility]

## 2020-02-10 ENCOUNTER — OFFICE VISIT (OUTPATIENT)
Dept: FAMILY MEDICINE CLINIC | Age: 30
End: 2020-02-10

## 2020-02-10 ENCOUNTER — HOSPITAL ENCOUNTER (OUTPATIENT)
Dept: LAB | Age: 30
Discharge: HOME | End: 2020-02-10
Payer: MEDICAID

## 2020-02-10 VITALS
WEIGHT: 273 LBS | SYSTOLIC BLOOD PRESSURE: 138 MMHG | RESPIRATION RATE: 16 BRPM | OXYGEN SATURATION: 97 % | HEART RATE: 91 BPM | BODY MASS INDEX: 36.18 KG/M2 | DIASTOLIC BLOOD PRESSURE: 96 MMHG | TEMPERATURE: 98.9 F | HEIGHT: 73 IN

## 2020-02-10 DIAGNOSIS — I10 ESSENTIAL HYPERTENSION: ICD-10-CM

## 2020-02-10 DIAGNOSIS — F90.2 ATTENTION DEFICIT HYPERACTIVITY DISORDER (ADHD), COMBINED TYPE: ICD-10-CM

## 2020-02-10 DIAGNOSIS — S92.352D CLOSED DISPLACED FRACTURE OF FIFTH METATARSAL BONE OF LEFT FOOT WITH ROUTINE HEALING: ICD-10-CM

## 2020-02-10 DIAGNOSIS — F41.1 GENERALIZED ANXIETY DISORDER: ICD-10-CM

## 2020-02-10 DIAGNOSIS — F90.2 ATTENTION DEFICIT HYPERACTIVITY DISORDER (ADHD), COMBINED TYPE: Primary | ICD-10-CM

## 2020-02-10 PROBLEM — K45.8 OTHER SPECIFIED ABDOMINAL HERNIA WITHOUT OBSTRUCTION OR GANGRENE: Status: ACTIVE | Noted: 2018-01-22

## 2020-02-10 PROBLEM — F98.8 ADULT ATTENTION DEFICIT DISORDER: Status: ACTIVE | Noted: 2018-01-22

## 2020-02-10 PROCEDURE — 80307 DRUG TEST PRSMV CHEM ANLYZR: CPT

## 2020-02-10 RX ORDER — MELOXICAM 15 MG/1
TABLET ORAL
COMMUNITY
Start: 2020-01-10

## 2020-02-10 RX ORDER — FAMOTIDINE 40 MG/1
TABLET, FILM COATED ORAL
COMMUNITY
Start: 2020-01-14

## 2020-02-10 RX ORDER — CANDESARTAN 8 MG/1
8 TABLET ORAL DAILY
Qty: 30 TAB | Refills: 0 | Status: SHIPPED | OUTPATIENT
Start: 2020-02-10 | End: 2020-02-13 | Stop reason: ALTCHOICE

## 2020-02-10 NOTE — PROGRESS NOTES
Rumaldo Galeazzi presents today for   Chief Complaint   Patient presents with    Hypertension     hx htn    Behavioral Problem     Hx of Adhd, states he will need med refill in a few days. Is someone accompanying this pt? No    Is the patient using any DME equipment during OV? No    Depression Screening:  3 most recent PHQ Screens 2/10/2020   PHQ Not Done -   Little interest or pleasure in doing things Not at all   Feeling down, depressed, irritable, or hopeless Not at all   Total Score PHQ 2 0       Learning Assessment:  No flowsheet data found. Abuse Screening:  Abuse Screening Questionnaire 2/10/2020   Do you ever feel afraid of your partner? N   Are you in a relationship with someone who physically or mentally threatens you? N   Is it safe for you to go home? Y         Health Maintenance Due   Topic Date Due    Pneumococcal 0-64 years (1 of 1 - PPSV23) 11/12/1996    Influenza Age 5 to Adult  08/01/2019   . Health Maintenance reviewed and discussed and ordered per Provider. Coordination of Care  1. Have you been to the ER, urgent care clinic since your last visit? Hospitalized since your last visit? No    2. Have you seen or consulted any other health care providers outside of the [address] [place] [address] since your last visit? Include any pap smears or colon screening. No        Advance Directive:  1. Do you have an advance directive in place? Patient Reply:No    2. If not, would you like material regarding how to put one in place?  Patient Reply: No
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
## 2020-02-11 ENCOUNTER — OFFICE VISIT (OUTPATIENT)
Dept: ORTHOPEDIC SURGERY | Age: 30
End: 2020-02-11

## 2020-02-11 ENCOUNTER — HOSPITAL ENCOUNTER (OUTPATIENT)
Dept: PHYSICAL THERAPY | Age: 30
Discharge: HOME | End: 2020-02-11
Payer: MEDICAID

## 2020-02-11 ENCOUNTER — TELEPHONE (OUTPATIENT)
Dept: FAMILY MEDICINE CLINIC | Age: 30
End: 2020-02-11

## 2020-02-11 VITALS
DIASTOLIC BLOOD PRESSURE: 95 MMHG | SYSTOLIC BLOOD PRESSURE: 148 MMHG | HEART RATE: 91 BPM | OXYGEN SATURATION: 95 % | RESPIRATION RATE: 12 BRPM | HEIGHT: 73 IN | WEIGHT: 273 LBS | TEMPERATURE: 98.2 F | BODY MASS INDEX: 36.18 KG/M2

## 2020-02-11 DIAGNOSIS — M25.572 ACUTE LEFT ANKLE PAIN: ICD-10-CM

## 2020-02-11 DIAGNOSIS — S92.355D CLOSED NONDISPLACED FRACTURE OF FIFTH METATARSAL BONE OF LEFT FOOT WITH ROUTINE HEALING, SUBSEQUENT ENCOUNTER: ICD-10-CM

## 2020-02-11 DIAGNOSIS — S82.65XD CLOSED NONDISPLACED FRACTURE OF LATERAL MALLEOLUS OF LEFT FIBULA WITH ROUTINE HEALING, SUBSEQUENT ENCOUNTER: Primary | ICD-10-CM

## 2020-02-11 PROCEDURE — 97110 THERAPEUTIC EXERCISES: CPT

## 2020-02-11 NOTE — PROGRESS NOTES
AMBULATORY PROGRESS NOTE      Patient: Marlys Moscoso             MRN: [id number]     SSN: [id number] Body mass index is 36.02 kg/m². YOB: 1990     AGE: 34 y.o. SEX: male    PCP: Modesto Lunsford NP     IMPRESSION/DIAGNOSIS AND TREATMENT PLAN     DIAGNOSES    1. Closed nondisplaced fracture of lateral malleolus of left fibula with routine healing, subsequent encounter    2. Closed nondisplaced fracture of fifth metatarsal bone of left foot with routine healing, subsequent encounter    3. Acute left ankle pain        Orders Placed This Encounter    Generic Supply Order    Generic Supply Order    [55230] Ankle Min 3V    [address] 2V    [employer] [address] understands his diagnoses and the proposed plan. So, my impression is that he would benefit from a laterally-posted orthotic bilaterally. On the left side, the main reason is to offload the lateral column of his foot and offload the fifth metatarsal distal-recinos, and the right side would be a laterally-posted orthotic as well to make him uniformly balanced. We will continue formal physical therapy for the left ankle and the right ankle. He mentioned having recurrent right ankle sprains in the past, for which he does have some instability of the right ankle as well. X-rays, three views of the left foot to include two views of the left ankle obtained today:     1. There is metatarsus adductus. 2. High arch type foot alignment in these nonweightbearing films. 3. There is a healed, left fifth metatarsal at the head neck junction on this left side. 4. There is mild joint space narrowing at the number two and three TMT joint articulation. 5. There is some spurring at the talar neck in the lateral radiographic x-ray. 6. The subtalar joint is well maintained. 7. The ankle joint is well maintained.    8. 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 He is improving slowly. He is not yet 100%. The plan is listed as below. I will have him go to [facility] for the following orthotic listed as below. Plan:    1) DME order: bilateral laterally posted orthotics (Re[facility])   2) Dictate letter to workman's comp for orthotics  3) Referral to PT to strengthen left ankle ligaments, Theraband, stabilizing ankle exercises. 4) Wean CAM walker boot and try to wear sneakers. 5) DME order: left AS/AC ankle brace      Albin Lopez MD  has ordered a custom brace or DME product for you. This will be customized and made for you by an outside facility. I am requesting that you contact the Orthotist company provided below in order to have the prescription made. One of our orthotists will be contacting you to set up an appointment in the next 2-3 business days. Saad Milks at [facility]:     [address] [facility] [place] [address]   Phone: [phone]    Or     [address] [place] [address]. [address] [place] [address] [facility]  Phone: [phone] - 1 month    [facility]    TRILAMINAR ORTHOTIC,  LATERAL POSTED Left      HPI AND EXAMINATION     Bj Ricks IS A 34 y.o. male who presents to my outpatient office for a follow up visit of closed nondisplaced fracture of lateral malleolus of left fibula and closed nondisplaced fracture of fifth metatarsal bone of left foot. At the last visit, I provided a referral to PT, instructed the pt to continue to wear left long CAM boot, prescribed Ergocalciferol 50,000, and instructed the patient to continue activity modification as directed. Date of injury: 11/26/2019. Since we saw him last, Mr. Giselle Johnson states that he is still experiencing intense pain in his left foot and is not able to put his foot into shoes.  He is not able to WB on his left foot especially without the boot. He describes the pain as a dull ache localized to anterior ankle. He notes also having some pain in his right foot that is more tolerable and he is able to WB. He recalls going up the stairs and feeling some popping. The patient is allergic to amoxicillin. Visit Vitals  BP (!) 148/95   Pulse 91   Temp 98.2 °F (36.8 °C) (Oral)   Resp 12   Ht 6' 1\" (1.854 m)   Wt 273 lb (123.8 kg)   SpO2 95%   BMI 36.02 kg/m²     Appearance: Alert, well appearing and pleasant patient who is in no distress, oriented to person, place/time, and who follows commands. Psychiatric: Affect and mood are appropriate. Cardiovascular/Peripheral Vascular: Normal Pulses to each hand and foot    Musculoskeletal:  LOCATION:  Left FOOT/ANKLE  Integumentary: No rashes, skin patches, wounds, or abrasions to the right or left legs       Warm and normal color. No regions of expressible drainage.     Mild   to lateral Ankle is present    Nice wrinkles    Swelling to Medial Ankle not present    Swelling to the dorsolateral foot    Swelling to the 5th metatarsal base is present    < 1 + swelling of ankle      Gait: slight limp       Tenderness: ATFL/CFL Anterolateral ankle ligaments tenderness is not present   Lateral and medial hindfoot tenderness is present   Lateral and medial calcaneal wall tenderness is present   Distal fibula tenderness is present   Mild Tenderness to peroneal tendons retro fibular left ankle   Anterior Syndesmosis is not present    Medial deltoid ligament tenderness is not  present   Peroneal tendon sheath present    4/5 Metatarsal base tenderness is present     Midfoot tenderness is not present    Achilles tenderness is not present    Cuboid tenderness is not present     Proximal fibula tenderness: is not present      Motor Strength/Tone Exam: Normal to the toes with respect to extension/flexion      Sensory Exam:   Intact Normal Sensation to ankle/foot      Stability Testing: Peroneal tendon instability tests: not conducted due to tenderness              Stability of ankle and subtalar region not tested due to tenderness      ROM: full ROM with pain       Contractures: No Achilles or Gastrocnemius Contractures      Calf tenderness: Absent for calf or gastrocnemius muscle regions       Soft, supple, non tender, non taut lower extremity compartments       Alignment: Neutral Hindfoot  Wounds/Abrasions:   None present  Extremities:   No embolic phenomena to the toes or hands         No significant edema to the foot and or toes. Lower extremities are warm and appear well perfused    DVT: No evidence of DVT seen on examination at this time             No calf swelling, no tenderness to calf muscles  Lymphatic:  No Evidence of Lymphedema  Vascular: Medial Border of Tibia Region: Edema is not present        Pulses: Dorsalis Pedis &  Posterior Tibial Pulses : Palpable yes        Varicosities Lower Limbs :  None    Neuro: Negative bilateral Straight leg raise (seated position)    See Musculoskeletal section for pertinent individual extremity examination    No abnormal hand/wrist, foot/ankle, or facial/neck tremors    Knees:  Left       Cutaneous: Skin intact, no abrasions, blisters, wounds, erythema       Effusion: Is not present       Crepitus:  mild PF joint crepitus       Tenderness: Patellar tendon        Alignment of Knee: neutral when standing       ROM: full range of motion       Fullness or swelling: None to popliteal fossa region       Stability: No instability to anterior, posterior, varus, valgus stress testing       Thrust: No varus thrust with gait       Neuro:  Extensor mechanism is intact      ANKLE/FOOT right    Gait: Normal  Tenderness: No tenderness . Cutaneous:  WNL. Joint Motion: full ROM with popping   Joint / Tendon Stability:  Some ankle or Subtalar instability or joint laxity.                        No peroneal sublux ability or dislocation  Alignment: Forefoot, Midfoot, Hindfoot WNL. Neuro Motor/Sensory: NL/NL. Vascular: NL foot/ankle pulses. Lymphatics: No extremity lymphedema, No calf swelling, no tenderness to calf muscles. CHART REVIEW     Past Medical History:   Diagnosis Date    ADD (attention deficit disorder)     Back injury     Back problem     Bipolar affective disorder ([facility] [address]     Depression     Fatty liver     Fractures     3 slip disk;     History of ear infections     Hypertension     Positive urine drug screen 10/25/2018    +cocaine and thc     Suicidal ideation      Current Outpatient Medications   Medication Sig    famotidine (PEPCID) 40 mg tablet     meloxicam (MOBIC) 15 mg tablet     candesartan (ATACAND) 8 mg tablet Take 1 Tab by mouth daily.  dextroamphetamine-amphetamine (ADDERALL) 30 mg tablet Take 1 Tab by mouth two (2) times a day for 14 days. Max Daily Amount: 2 Tabs.  calcium carbonate-vitamin D3 (CALTRATE 600 PLUS D) 600 mg (1,500 mg)-800 unit chew Take 1 Tab by mouth daily.  diclofenac (VOLTAREN) 1 % gel Apply 4 g to affected area four (4) times daily. USE AS DIRECTED    dextroamphetamine-amphetamine (ADDERALL) 30 mg tablet Take 1 Tab by mouth two (2) times a day for 28 days. Max Daily Amount: 2 Tabs. Indications: Attention Deficit Disorder with Hyperactivity    ondansetron hcl (ZOFRAN) 4 mg tablet Take 1 Tab by mouth every eight (8) hours as needed for Nausea.  albuterol (PROVENTIL HFA, VENTOLIN HFA, PROAIR HFA) 90 mcg/actuation inhaler Take 1-2 Puffs by inhalation every four (4) hours as needed for Wheezing.  LORazepam (ATIVAN) 0.5 mg tablet Take 1 Tab by mouth two (2) times daily as needed for Anxiety for up to 30 days. Max Daily Amount: 1 mg. Indications: anxious     No current facility-administered medications for this visit.       Allergies   Allergen Reactions    Amoxicillin Swelling, Itching, Shortness of Breath and Unknown (comments)     Past Surgical History:   Procedure Laterality Date    HX TYMPANOSTOMY       Social History     Occupational History    Occupation: Unemployed   Tobacco Use    Smoking status: Current Every Day Smoker     Packs/day: 1.00     Years: 12.00     Pack years: 12.00    Smokeless tobacco: Current User   Substance and Sexual Activity    Alcohol use: Yes     Alcohol/week: 6.7 standard drinks     Types: 8 Cans of beer per week     Frequency: Monthly or less     Drinks per session: 5 or 6     Binge frequency: Less than monthly     Comment: social    Drug use: Yes     Types: Marijuana    Sexual activity: Yes     Partners: Female     Birth control/protection: Condom     Family History   Problem Relation Age of Onset    Cancer Father         mesothelioma    Heart Disease Maternal Grandmother     Diabetes Maternal Grandmother         REVIEW OF SYSTEMS : 2/11/2020  ALL BELOW ARE Negative except : SEE HPI         REVIEW OF SYSTEMS : Total of 12 systems reviewed as follows:            CONSTITUTIONAL: No weight loss. PSYCHOLOGICAL : No Feelings of anxiety, depression, agitation  EYES: No blurred vision and no eye discharge. NO eye pain, double vision  ENT: No nasal discharge. No ear pain. CARDIOVASCULAR: No chest pain and no diaphoresis. RESPIRATORY: No cough, no hemoptysis. GI: No vomiting, no diarrhea   : No urinary frequency and no dysuria. MUSCULOSKELETAL: see HPI  SKIN: No rashes. NEURO: Negative for : dizziness,weakness, headaches     Negative for : visual changes or confusion, or seizures,   ENDOCRINE: No polyphagia and no polydipsia. HEMATOLOGY: No bleeding tendencies. DIAGNOSTIC IMAGING         No notes on file    Please see above section of this report. I have personally reviewed the results of the above study. The interpretation of this study is my professional opinion. Written by Lurdes Khoury, as dictated by Dr. Swati Barrientos. I, Dr. Swati Barrientos, confirm that all documentation is accurate.

## 2020-02-11 NOTE — PROGRESS NOTES
1. Have you been to the ER, urgent care clinic since your last visit? Hospitalized since your last visit? No    2. Have you seen or consulted any other health care providers outside of the [address] [place] [address] since your last visit? Include any pap smears or colon screening.  No

## 2020-02-11 NOTE — PROGRESS NOTES
PT DAILY TREATMENT NOTE 10-18    Patient Name: Sudheer Ndiaye  Date:2020  : 1990  [x]  Patient  Verified  Payor: [facility] MEDICAID / Plan: [place] [employer] [place] [employer]P / Product Type: Managed Care Medicaid /    In time: 9:40  Out time: 10:13  Total Treatment Time (min): 33  Visit #: 5 of 10    Treatment Area: Right ankle pain [M25.571]  Sprain of unspecified ligament of right ankle, subsequent encounter [J95.564D]    SUBJECTIVE  Pain Level (0-10 scale): 5/10  Any medication changes, allergies to medications, adverse drug reactions, diagnosis change, or new procedure performed?: [x] No    [] Yes (see summary sheet for update)  Subjective functional status/changes:   [] No changes reported  Pt reports continued pain in his right ankle which he feels he will likely have to deal with due to his injuries from the past. He goes to see the MD today for his left ankle. He states the pain is on the inner side of his ankle and deep in the front of his ankle. He wants to be able to walk without the boot but has tried and had increased pain.      OBJECTIVE    33 min Therapeutic Exercise:  [x] See flow sheet :   Rationale: increase ROM and increase strength to improve the patients ability to increase ease with ADLs      With   [x] TE   [] TA   [] neuro   [] other: Patient Education: [x] Review HEP    [] Progressed/Changed HEP based on:   [] positioning   [] body mechanics   [] transfers   [] heat/ice application    [] other:      Other Objective/Functional Measures:   Pain with resisted inversion but improved with isometric activation  No pain with eversion just weakness  Educated on correct form with short foot; only able to perform in sitting at this time  Educated on orthotics for arch support to assist with ankle alignment for ambulation; may want to try KT support first  Added seated level 1 BAPS for ankle stability  Educated on an ankle brace for stability to prevent further injury while healing    Pain Level (0-10 scale) post treatment: 5/10    ASSESSMENT/Changes in Function: Pt progressing slowly in regards to decreased right ankle pain but does have improving strength. He is most limited with inversion strength and has decreased DF mobility. Will work to improve global right ankle stability to prevent chance of reinjury and to improve strength for ease of ambulation with decreased pain. Progress towards goals / Updated goals:  Short Term Goals: To be accomplished in 2 treatments:  1. Pt will report compliance and independence to [facility] to help the pt manage their pain and symptoms.             Goal met. 1/31/2020   Long Term Goals: To be accomplished in 10 treatments:  1. Pt will increase FOTO score to 62 points to improve ability to perform ADLs. Eval: 44 points  2. Pt will report an improvement in at worst pain to 6/10 to improve ability to ambulate community distances with more ease. Progressing. Had 4/10 pain today. 2/4/20  3. Pt will increase AROM right ankle DF to 5 degs, PF to 55 degs, INV to 35 degs to improve ability to tolerate work activities. Eval: ankle DF 0 degs, PF 42 degs, INV 21 degs  4.  Pt will report being able to walk 2 block with no difficulty secondary to right foot/ankle pain to improve ability to perform functional activities.   Eval: quite a bit of difficulty per FOTO    PLAN  [x]  Upgrade activities as tolerated     [x]  Continue plan of care  []  Update interventions per flow sheet       []  Discharge due to:_  []  Other:_      Marlin Ards, PTA 2/11/2020  2:01 PM    Future Appointments   Date Time Provider Lisandro Bhagati   2/11/2020  9:30 AM Kelly Moore PTA [id number]   2/11/2020  1:20 PM Maite Hong MD [address]   2/14/2020 11:00 AM Gregg Dodd, PT [id number] [facility]   2/18/2020  1:00 PM Kelly Moore PTA [facility]   2/20/2020 10:00 AM Lita Nixon, PT IEYWENA [facility]   2/21/2020  2:00 PM [facility] 2/24/2020 12:30 PM Pato Horton PTA [facility]   2/26/2020 11:30 AM Gary Grant PTA [facility]

## 2020-02-11 NOTE — PATIENT INSTRUCTIONS
You have been provided with an order for durable medical equipment that you may  at an outside facility as our office does not carry the equipment you need. You may pick it up at any medical supply company you like.  Listed below are a few different locations for your convenience:    [address] [place] [address],[address]  [address], [address] [address]  Phone: [phone]    bilateral laterally posted orthotics

## 2020-02-13 ENCOUNTER — TELEPHONE (OUTPATIENT)
Dept: FAMILY MEDICINE CLINIC | Age: 30
End: 2020-02-13

## 2020-02-13 RX ORDER — LOSARTAN POTASSIUM 25 MG/1
25 TABLET ORAL DAILY
Qty: 30 TAB | Refills: 0 | Status: SHIPPED | OUTPATIENT
Start: 2020-02-13 | End: 2020-02-24 | Stop reason: DRUGHIGH

## 2020-02-13 NOTE — TELEPHONE ENCOUNTER
Message left regarding alternate blood pressure medication order placed.   Lorsartan instead of Candesartan

## 2020-02-14 ENCOUNTER — HOSPITAL ENCOUNTER (OUTPATIENT)
Dept: PHYSICAL THERAPY | Age: 30
Discharge: HOME | End: 2020-02-14
Payer: MEDICAID

## 2020-02-14 NOTE — PROGRESS NOTES
PT DAILY TREATMENT NOTE - [facility]  Patient Name: Bj Ricks Date:2020 : 1990 [x]  Patient  Verified Payor: [facility] MEDICAID / Plan: [place] [employer] COMMUNITY [employer]AN [place] [employer]P / Product Type: Managed Care Medicaid / In time:***  Out time:*** Total Treatment Time (min): *** Total Timed Codes (min): *** 
1:1 Treatment Time ( [address] only): *** Visit #: *** of *** Treatment Area: Right ankle pain [M25.571] Sprain of unspecified ligament of right ankle, subsequent encounter [G91.392K] SUBJECTIVE Pain Level (0-10 scale): *** Any medication changes, allergies to medications, adverse drug reactions, diagnosis change, or new procedure performed?: [x] No    [] Yes (see summary sheet for update) Subjective functional status/changes:   [] No changes reported *** OBJECTIVE Modality rationale: {BSHSI INMOTION MODALITIES:43895} to improve the patients ability to *** Min Type Additional Details  
 [] Estim:  []Unatt       []IFC  []Premod []Other:  []w/ice   []w/heat Position: Location:  
 [] Estim: []Att    []TENS instruct  []NMES []Other:  []w/US   []w/ice   []w/heat Position: Location:  
 []  Traction: [] Cervical       []Lumbar 
                     [] Prone          []Supine []Intermittent   []Continuous Lbs: 
[] before manual 
[] after manual  
 []  Ultrasound: []Continuous   [] Pulsed []1MHz   []3MHz W/cm2: 
Location:  
 []  Iontophoresis with dexamethasone Location: [] Take home patch  
[] In clinic  
 []  Ice     []  heat 
[]  Ice massage 
[]  Laser  
[]  Anodyne Position: Location:  
 []  Laser with stim 
[]  Other:  Position: Location:  
 []  Vasopneumatic Device Pressure:       [] lo [] med [] hi  
Temperature: [] lo [] med [] hi  
[] Skin assessment post-treatment:  []intact []redness- no adverse reaction 
  []redness  adverse reaction: *** min Therapeutic Exercise:  [] See flow sheet :  
Rationale: {BSHSI IMMOTION THER EX:47056} to improve the patients ability to *** 
 
*** min Manual Therapy:  *** Rationale: {BSHSI IMMOTION MANUAL THERAPY:62923} to *** With 
 [] TE 
 [] TA 
 [] neuro 
 [] other: Patient Education: [x] Review HEP [] Progressed/Changed HEP based on:  
[] positioning   [] body mechanics   [] transfers   [] heat/ice application   
[] other:   
 
Other Objective/Functional Measures: *** Pain Level (0-10 scale) post treatment: *** ASSESSMENT/Changes in Function: *** Patient will continue to benefit from skilled PT services to modify and progress therapeutic interventions, address functional mobility deficits, address ROM deficits, address strength deficits, analyze and address soft tissue restrictions, analyze and cue movement patterns, analyze and modify body mechanics/ergonomics, assess and modify postural abnormalities and address imbalance/dizziness to attain remaining goals. [x]  See Plan of Care 
[]  See progress note/recertification 
[]  See Discharge Summary Progress towards goals / Updated goals: 
 
Short Term Goals: To be accomplished in 2 treatments: 1. Pt will report compliance and independence to HEP to help the pt manage their pain and symptoms.            
Goal met. 1/31/2020  
Long Term Goals: To be accomplished in 10 treatments: 1. Pt will increase FOTO score to 62 points to improve ability to perform ADLs. Eval: 44 points 2. Pt will report an improvement in at worst pain to 6/10 to improve ability to ambulate community distances with more ease. Progressing. Had 4/10 pain today. 2/4/20 3. Pt will increase AROM right ankle DF to 5 degs, PF to 55 degs, INV to 35 degs to improve ability to tolerate work activities. Eval: ankle DF 0 degs, PF 42 degs, INV 21 degs 4.  Pt will report being able to walk 2 block with no difficulty secondary to right foot/ankle pain to improve ability to perform functional activities.  
Eval: quite a bit of difficulty per [facility] [x]  Upgrade activities as tolerated     [x]  Continue plan of care 
[]  Update interventions per flow sheet      
[]  Discharge due to:_ 
[]  Other:_ Silvestre Winter, PT 2/14/2020  9:23 AM 
 
Future Appointments Date Time Provider Lisandro Mayte 2/14/2020 11:00 AM Gregg Mas, PT [facility]  
2/18/2020  1:00 PM Matt Medina, PTA [facility]  
2/20/2020 10:00 AM Anson Carrion, PT [facility]  
2/21/2020  2:00 PM Modesto Lunsford, [facility] None 2/24/2020 12:30 PM Matt Medina, PTA [facility]  
2/26/2020 11:30 AM Roselia Phillips, PTA [facility]  
3/10/2020  1:40 PM Eric Hong MD [address]

## 2020-02-16 LAB — DRUGS UR: NORMAL

## 2020-02-24 ENCOUNTER — TELEPHONE (OUTPATIENT)
Dept: FAMILY MEDICINE CLINIC | Age: 30
End: 2020-02-24

## 2020-02-24 ENCOUNTER — OFFICE VISIT (OUTPATIENT)
Dept: FAMILY MEDICINE CLINIC | Age: 30
End: 2020-02-24

## 2020-02-24 ENCOUNTER — HOSPITAL ENCOUNTER (OUTPATIENT)
Dept: PHYSICAL THERAPY | Age: 30
End: 2020-02-24
Payer: MEDICAID

## 2020-02-24 VITALS
BODY MASS INDEX: 36.31 KG/M2 | SYSTOLIC BLOOD PRESSURE: 136 MMHG | HEART RATE: 93 BPM | TEMPERATURE: 97.8 F | RESPIRATION RATE: 20 BRPM | OXYGEN SATURATION: 98 % | DIASTOLIC BLOOD PRESSURE: 88 MMHG | WEIGHT: 274 LBS | HEIGHT: 73 IN

## 2020-02-24 DIAGNOSIS — F90.2 ATTENTION DEFICIT HYPERACTIVITY DISORDER (ADHD), COMBINED TYPE: ICD-10-CM

## 2020-02-24 DIAGNOSIS — I10 ESSENTIAL HYPERTENSION: Primary | ICD-10-CM

## 2020-02-24 RX ORDER — DEXTROAMPHETAMINE SACCHARATE, AMPHETAMINE ASPARTATE, DEXTROAMPHETAMINE SULFATE AND AMPHETAMINE SULFATE 7.5; 7.5; 7.5; 7.5 MG/1; MG/1; MG/1; MG/1
30 TABLET ORAL 2 TIMES DAILY
Qty: 60 TAB | Refills: 0 | Status: SHIPPED | OUTPATIENT
Start: 2020-02-24 | End: 2020-03-26

## 2020-02-24 RX ORDER — LOSARTAN POTASSIUM 50 MG/1
50 TABLET ORAL DAILY
Qty: 90 TAB | Refills: 0 | Status: SHIPPED | OUTPATIENT
Start: 2020-02-24

## 2020-02-24 NOTE — TELEPHONE ENCOUNTER
Per insurance, generic Adderal requires a prior authorization. She gave me the number to call [phone]. I called and gave all pertinent information explaining he was put on this medication on 11/4/18 and had been on it since them. He was referred to our practice by the [facility] because he now has insurance. I was told it could take up to 24 hours to receive a fax notification of approval or denial.     I called patient and advised.
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
## 2020-02-24 NOTE — PROGRESS NOTES
Luciana Landry presents today for   Chief Complaint   Patient presents with    Behavioral Problem     visit to follow up r/t ADD medication    Hypertension     Follow up. Patient has no complaints r/t new BP med. Is someone accompanying this pt? No    Is the patient using any DME equipment during OV? No    Depression Screening:  3 most recent PHQ Screens 2/11/2020   PHQ Not Done -   Little interest or pleasure in doing things Not at all   Feeling down, depressed, irritable, or hopeless Not at all   Total Score PHQ 2 0       Learning Assessment:  No flowsheet data found. Abuse Screening:  Abuse Screening Questionnaire 2/10/2020   Do you ever feel afraid of your partner? N   Are you in a relationship with someone who physically or mentally threatens you? N   Is it safe for you to go home? Y         Health Maintenance Due   Topic Date Due    Pneumococcal 0-64 years (1 of 1 - PPSV23) 11/12/1996    Influenza Age 5 to Adult  08/01/2019   . Health Maintenance reviewed and discussed and ordered per Provider. Coordination of Care  1. Have you been to the ER, urgent care clinic since your last visit? Hospitalized since your last visit? No    2. Have you seen or consulted any other health care providers outside of the [address] [place] [address] since your last visit? Include any pap smears or colon screening. No    Advance Directive:  1. Do you have an advance directive in place? Patient Reply:No    2. If not, would you like material regarding how to put one in place?  Patient Reply: No

## 2020-02-24 NOTE — LETTER
Name:Mónica GODINEZ:[birth year] MR #:[id number] Provider Shavon Bradley NP  
*CTAA-916* BSMG-491 (5/16) Page 1 of 5 Initial [employer] CONTROLLED SUBSTANCE AGREEMENT I may be prescribed medications that are controlled substances as part  of my treatment plan for management of my medical condition(s). The goal of my treatment plan is to maintain and/or improve my health and wellbeing. Because controlled substances have an increased risk of abuse or harm, continual re-evaluation is needed determine if the goals of my treatment plan are being met for my safety and the safety of others. Kelsea Grossman  am entering into this Controlled Substance Agreement with my provider, Reena Cooper NP at [place] [address] . I understand that successful treatment requires mutual trust and honesty between me and my provider. I understand that there are state and federal laws and regulations which apply to the medications that my provider may prescribe that must be followed. I understand there are risks and benefits ts of taking the medicines that my provider may prescribe. I understand and agree that following this Agreement is necessary in continuing my provider-patient relationship and success of my treatment plan. As a part of my treatment plan, I agree to the following: COMMUNICATION: 
 
1. I will communicate fully with my provider about my medical condition(s), including the effect on my daily life and how well my medications are helping. I will tell my provider all of the medications that I take for any reason, including medications I receive from another health care provider, and will notify my provider about all issues, problems or concerns, including any side effects, which may be related to my medications. I understand that this information allows my provider to adjust my treatment plan to help manage my medical condition.  I understand that this information will become part of my permanent medical record. 2. I will notify my provider if I have a history of alcohol/drug misuse/addiction or if I have had treatment for alcohol/drug addiction in the past, or if I have a new problem with or concern about alcohol/drug use/addiction, because this increases the likelihood of high risk behaviors and may lead to serious medical conditions. 3. Females Only: I will notify my provider if I am or become pregnant, or if I intend to become pregnant, or if I intend to breastfeed. I understand that communication of these issues with my provider is important, due to possible effects my medication could have on an unborn fetus or breastfeeding child. Name:.Efraín Euceda IIU:10/39/1922 MR #:[id number] Provider Shannan Gaffney NP  
*KBOX-854* BSMG-491 (5/16) Page 2 of 5 Initial [employer] MISUSE OF MEDICATIONS / DRUGS: 
 
1. I agree to take all controlled substances as prescribed, and will not misuse or abuse any controlled substances prescribed by my provider. For my safety, I will not increase the amount of medicine I take without first talking with and getting permission from my provider. 2. If I have a medical emergency, another health care provider may prescribe me medication. If I seek emergency treatment, I will notify my provider within seventy-two (72) hours. 3. I understand that my provider may discuss my use and/or possible misuse/abuse of controlled substances and alcohol, as appropriate, with any health care provider involved in my care, pharmacist or legal authority. ILLEGAL DRUGS: 
 
1. I will not use illegal drugs of any kind, including but not limited to marijuana, heroin, cocaine, or any prescription drug which is not prescribed to me. DRUG DIVERSION / PRESCRIPTION FRAUD: 
 
1. I will not share, sell, trade, give away, or otherwise misuse my prescriptions or medications. 2. I will not alter any prescriptions provided to me by my provider. SINGLE PROVIDER: 
 
1. I agree that all controlled substances that I take will be prescribed only by my provider (or his/her covering provider) under this Agreement. This agreement does not prevent me from seeking emergency medical treatment or receiving pain management related to a surgery. PROTECTING MEDICATIONS: 
 
1. I am responsible for keeping my prescriptions and medications in a safe and secure place including safeguarding them from loss or theft. I understand that lost, stolen or damaged/destroyed prescriptions or medications will not be replaced. Name:.Efraín Roe ZYL:[birth year] MR #:[id number] Provider Abraham Martin NP  
*GGOL-265* BSMG-491 (5/16) Page 3 of 5 Initial [employer] PRESCRIPTION RENEWALS/REFILLS: 
 
1. I will follow my controlled substance medication schedule as prescribed by my provider. 2. I understand and agree that I will make any requests for renewals or refills of my prescriptions only at the time of an office visit or during my providers regular office hours subject to the prescription refill requirements of the individual practice. 3. I understand that my provider may not call in prescriptions for controlled substances to my pharmacy. 4. I understand that my provider may adjust or discontinue these medications as deemed appropriate for my medical treatment plan. This Agreement does not guarantee the prescription of controlled medications. 5. I agree that if my medications are adjusted or discontinued, I will properly dispose of any remaining medications. I understand that I will be required to dispose of any remaining controlled medications prior to being provided with any prescriptions for other controlled medications.  
 
 
1. I authorize my provider and my pharmacy to cooperate fully with any local, state, or federal law enforcement agency in the investigation of any possible misuse, sale, or other diversion of my controlled substance prescriptions or medications. RISKS: 
 
 
1. I understand that if I do not adhere to this Agreement in any way, my provider may change my prescriptions, stop prescribing controlled substances or end our provider-patient relationship. 2. If my provider decides to stop prescribing medication, or decides to end our provider-patient relationship,my provider may require that I taper my medications slowly. If necessary, my provider may also provide a prescription for other medications to treat my withdrawal symptoms. UNDERSTANDING THIS AGREEMENT: 
 
I understand that my provider may adjust or stop my prescriptions for controlled substances based on my medical condition and my treatment plan. I understand that this Agreement does not guarantee that I will be prescribed medications or controlled substances. I understand that controlled substances may be just one part 
of my treatment plan. My initial on each page and my signature below shows that I have read each page of this Agreement, I have had an opportunity to ask questions, and all of my questions have been answered to my satisfaction by my provider. By signing below, I agree to comply with this Agreement, and I understand that if I do not follow the Agreements listed above, my provider may stop 
 
 
 
_________________________________________  Date/Time 2/24/2020 12:43 PM   
             (Patient Signature)

## 2020-02-25 ENCOUNTER — DOCUMENTATION ONLY (OUTPATIENT)
Dept: ORTHOPEDIC SURGERY | Age: 30
End: 2020-02-25

## 2020-02-25 DIAGNOSIS — M25.571 ACUTE BILATERAL ANKLE PAIN: ICD-10-CM

## 2020-02-25 DIAGNOSIS — M25.572 ACUTE BILATERAL ANKLE PAIN: ICD-10-CM

## 2020-02-25 DIAGNOSIS — S82.65XD CLOSED NONDISPLACED FRACTURE OF LATERAL MALLEOLUS OF LEFT FIBULA WITH ROUTINE HEALING, SUBSEQUENT ENCOUNTER: Primary | ICD-10-CM

## 2020-02-25 DIAGNOSIS — M25.371 INSTABILITY OF RIGHT ANKLE JOINT: ICD-10-CM

## 2020-02-25 NOTE — PROGRESS NOTES
Holli Boss, TAYLOR with [employer], sent a message to Dr. Marc Pedersen asking for an order for bilateral ankle therapy. Per chanelle Hernandez for order.  New order placed and sent to the [facility]  [facility]

## 2020-02-27 NOTE — TELEPHONE ENCOUNTER
I submitted prior auth request on 2/24/20. I called on 2/25/20 to inquire and was told they needed a urine drug screen, for which I sent them the results. Medication was approved on 2/25/20. Patient was kept updated on Monday and Tuesday.

## 2020-03-02 ENCOUNTER — HOSPITAL ENCOUNTER (OUTPATIENT)
Dept: PHYSICAL THERAPY | Age: 30
Discharge: HOME | End: 2020-03-02
Payer: MEDICAID

## 2020-03-02 PROCEDURE — 97140 MANUAL THERAPY 1/> REGIONS: CPT

## 2020-03-02 PROCEDURE — 97110 THERAPEUTIC EXERCISES: CPT

## 2020-03-02 NOTE — PROGRESS NOTES
PT DAILY TREATMENT NOTE 10-18    Patient Name: Marilin Gardner  Date:3/2/2020  : 1990  [x]  Patient  Verified  Payor: [facility] MEDICAID / Plan: [place] [employer] [employer] / Product Type: Managed Care Medicaid /    In time: 4:00   Out time: 4:30  Total Treatment Time (min): 30  Visit #: 7 of 10    Treatment Area: Right ankle pain [M25.571]  Sprain of unspecified ligament of right ankle, subsequent encounter [S9.232D]    SUBJECTIVE  Pain Level (0-10 scale): 4-5/10  Any medication changes, allergies to medications, adverse drug reactions, diagnosis change, or new procedure performed?: [x] No    [] Yes (see summary sheet for update)  Subjective functional status/changes:   [] No changes reported  Pt reports he is pushing himself to wear a regular shoe and try to walk normally even though he has a lot of pain. He got the brace for his left ankle. He states pain in the right at worst is 4/10. He notes he has been doing his exercises at home even with gaps trying to make doctor's appts and do a driving class. OBJECTIVE    20 min Therapeutic Exercise:  [x] See flow sheet :   Rationale: increase ROM and increase strength to improve the patients ability to increase ease with ADLs    10 minutes for manual therapy; right upslip right AI; left L3-L5 rotation; left/left sacral torsion; leg lengthening to correct right upslip; MET for RIght AI; MET for l/s and sacral rotation      With   [x] TE   [] TA   [] neuro   [] other: Patient Education: [x] Review HEP    [] Progressed/Changed HEP based on:   [] positioning   [] body mechanics   [] transfers   [] heat/ice application    [] other:      Other Objective/Functional Measures:    FOTO: TC  Pain at worst: 4/10  AROM right ankle DF: 22 degrees  AROM right ankle PF: 30 degrees  AROM right ankle inversion: 30 degrees  Walking 2 blocks: pain  Educated on self pelvic correction and cues to stretch right piriformis  Provided with PTB for HEP  TTP right tibialis anterior consistent with shin splint; educated on ice rolling and rest    Pain Level (0-10 scale) post treatment: 4/10    ASSESSMENT/Changes in Function:    See progress note. Progress towards goals / Updated goals:  Short Term Goals: To be accomplished in 2 treatments:  1. Pt will report compliance and independence to HEP to help the pt manage their pain and symptoms.             Goal met. 1/31/2020   Long Term Goals: To be accomplished in 10 treatments:  1. Pt will increase FOTO score to 62 points to improve ability to perform ADLs. Eval: 44 points  2. Pt will report an improvement in at worst pain to 6/10 to improve ability to ambulate community distances with more ease. Progressing. Had 4/10 pain today. 2/4/20  Not met 4-5/10   MET  3. Pt will increase AROM right ankle DF to 5 degs, PF to 55 degs, INV to 35 degs to improve ability to tolerate work activities. Eval: ankle DF 0 degs, PF 42 degs, INV 21 degs  Improving but not formally measured >0 degrees DF  Progressing AROM right ankle DF: 22 degrees  AROM right ankle PF: 30 degrees  AROM right ankle inversion: 30 degrees  4.  Pt will report being able to walk 2 block with no difficulty secondary to right foot/ankle pain to improve ability to perform functional activities.   Eval: quite a bit of difficulty per FOTO  Increased pain    PLAN  [x]  Upgrade activities as tolerated     [x]  Continue plan of care  []  Update interventions per flow sheet       []  Discharge due to:_  []  Other:_      Mary Beth Swartz PTA 3/2/2020  2:01 PM    Future Appointments   Date Time Provider Lisandro Hu   3/2/2020  4:00 PM Rabia Guevara [facility]   3/10/2020  1:40 PM Marietta Hong MD [address]   4/24/2020  1:15 PM Sarah Garcia NP [facility] None

## 2020-03-03 NOTE — PROGRESS NOTES
[facility] Amishashanicemaria l Cervantes  [address] [facility]  [phone] fax    Physical Therapy Progress Note  Patient name: Stoney Whalen Start of Care: 2020   Referral source: Sohail Guzman MD : 1990   Medical/Treatment Diagnosis: Right ankle pain [M25.571]  Sprain of unspecified ligament of right ankle, subsequent encounter [S92.178D]  Payor: [facility] MEDICAID / Plan: [place] [employer] / Product Type: Managed Care Medicaid /  Onset Date:2019     Prior Hospitalization: see medical history Provider#: [id number]   Medications: Verified on Patient Summary List    Comorbidities: tobacco use, HTN, recent weight gain, chronic LBP, bipolar disorder per MD note, hx 1-2 ankle fractures and several sprains in his right ankle in the past.   Prior Level of Function:Independent with ADLs, functional, and work tasks with intermittent LBP prior to the initial onset. Visits from Start of Care: 7    Missed Visits: 4    Established Goals:         Excellent           Good         Limited           None  [x] Increased ROM   []  [x]  []  []  [x] Increased Strength  []  [x]  []  []  [x] Increased Mobility  []  [x]  []  []   [x] Decreased Pain   []  []  [x]  []  [] Decreased Swelling  []  []  []  []    Key Functional Changes: Improved AROM, ambulating without Boot, improving strength      Short Term Goals: To be accomplished in 2 treatments:  1. Pt will report compliance and independence to [facility] to help the pt manage their pain and symptoms.             Goal met. 2020   Long Term Goals: To be accomplished in 10 treatments:  1. Pt will increase FOTO score to 62 points to improve ability to perform ADLs. Eval: 44 points  2. Pt will report an improvement in at worst pain to 6/10 to improve ability to ambulate community distances with more ease. Progressing. Had 4/10 pain today. 20  Not met 4-5/10   MET  3.  Pt will increase AROM right ankle DF to 5 degs, PF to 55 degs, INV to 35 degs to improve ability to tolerate work activities. Eval: ankle DF 0 degs, PF 42 degs, INV 21 degs  Improving but not formally measured >0 degrees DF  Progressing AROM right ankle DF: 22 degrees  AROM right ankle PF: 30 degrees  AROM right ankle inversion: 30 degrees  4. Pt will report being able to walk 2 block with no difficulty secondary to right foot/ankle pain to improve ability to perform functional activities.   Eval: quite a bit of difficulty per FOTO  Increased pain    Updated Goals: to be achieved in 6 weeks:  1. Pt will increase FOTO score to 62 points to improve ability to perform ADLs. 2. Pt will increase B ankle strength to 4/5 to improve ease of ambulation. 3. Pt will report decreased pain in B ankles to <4/10 to improve ease of working. 4. Pt will be able to perform SLS B for 30 seconds to show improved stability for walking on uneven surfaces for work. 5. Pt will be independent with final HEP for self progression of stretching/strengthening upon D/C from therapy. ASSESSMENT/RECOMMENDATIONS: Mr. Sara Buchanan is progressing in PT in 7 visits with increased AROM of the right ankle. He still averages 4/10 pain due to instability and joint stiffness. He struggles with prolonged ambulation and walking on uneven surfaces. He continues to demonstrate a pelvic obliquity likely due to hip weakness and uneven weight shifting due to left CAM boot and pain. 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      [x]Continue therapy per initial plan/protocol at a frequency of  2 x per week for 6 weeks  []Continue therapy with the following recommended changes:_____________________      _____________________________________________________________________  []Discontinue therapy progressing towards or have reached established goals  []Discontinue therapy due to lack of appreciable progress towards goals  []Discontinue therapy due to lack of attendance or compliance  []Await Physician's recommendations/decisions regarding therapy  []Other:________________________________________________________________    Thank you for this referral.   Hyacinth Mcburney, PTA 3/2/2020 7:10 PM    NOTE TO PHYSICIAN:  [address] TO [facility]: [phone]  If you are unable to process this request in 24 hours please contact our office: [phone]    ? I have read the above report and request that my patient continue as recommended. ? I have read the above report and request that my patient continue therapy with the following changes/special instructions:____________________________________  ? I have read the above report and request that my patient be discharged from therapy.     Physicians signature: ______________________________Date: ______Time:______

## 2020-03-17 ENCOUNTER — TELEPHONE (OUTPATIENT)
Dept: ORTHOPEDIC SURGERY | Age: 30
End: 2020-03-17

## 2020-03-17 DIAGNOSIS — I10 ESSENTIAL HYPERTENSION: ICD-10-CM

## 2020-03-17 DIAGNOSIS — F90.2 ATTENTION DEFICIT HYPERACTIVITY DISORDER (ADHD), COMBINED TYPE: ICD-10-CM

## 2020-03-17 RX ORDER — DEXTROAMPHETAMINE SACCHARATE, AMPHETAMINE ASPARTATE, DEXTROAMPHETAMINE SULFATE AND AMPHETAMINE SULFATE 7.5; 7.5; 7.5; 7.5 MG/1; MG/1; MG/1; MG/1
30 TABLET ORAL 2 TIMES DAILY
Qty: 60 TAB | Refills: 0 | OUTPATIENT
Start: 2020-03-17 | End: 2020-04-16

## 2020-03-17 RX ORDER — LOSARTAN POTASSIUM 50 MG/1
50 TABLET ORAL DAILY
Qty: 90 TAB | Refills: 0 | OUTPATIENT
Start: 2020-03-17

## 2020-03-18 NOTE — TELEPHONE ENCOUNTER
Spoke with Dr. Gab Zamudoi and he does not want to prescribe any medication. Patient can take OTC pain medication as needed at this time.     Bishop Quinones PA-C  3/18/2020  2:46 PM

## 2020-03-20 ENCOUNTER — TELEPHONE (OUTPATIENT)
Dept: FAMILY MEDICINE CLINIC | Age: 30
End: 2020-03-20

## 2020-03-23 ENCOUNTER — OFFICE VISIT (OUTPATIENT)
Dept: FAMILY MEDICINE CLINIC | Age: 30
End: 2020-03-23

## 2020-03-23 VITALS
TEMPERATURE: 98.6 F | HEART RATE: 91 BPM | OXYGEN SATURATION: 97 % | SYSTOLIC BLOOD PRESSURE: 138 MMHG | HEIGHT: 73 IN | RESPIRATION RATE: 20 BRPM | DIASTOLIC BLOOD PRESSURE: 82 MMHG | WEIGHT: 278 LBS | BODY MASS INDEX: 36.84 KG/M2

## 2020-03-23 DIAGNOSIS — F90.2 ATTENTION DEFICIT HYPERACTIVITY DISORDER (ADHD), COMBINED TYPE: ICD-10-CM

## 2020-03-23 DIAGNOSIS — I10 ESSENTIAL HYPERTENSION: Primary | ICD-10-CM
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
## 2020-03-23 NOTE — PROGRESS NOTES
Colleen Romero presents today for   Chief Complaint   Patient presents with    Behavioral Problem    Hypertension       Is someone accompanying this pt? No     Is the patient using any DME equipment during OV? No    Depression Screening:  3 most recent PHQ Screens 2/11/2020   PHQ Not Done -   Little interest or pleasure in doing things Not at all   Feeling down, depressed, irritable, or hopeless Not at all   Total Score PHQ 2 0         Abuse Screening:  Abuse Screening Questionnaire 2/10/2020   Do you ever feel afraid of your partner? N   Are you in a relationship with someone who physically or mentally threatens you? N   Is it safe for you to go home? Y         Health Maintenance Due   Topic Date Due    Pneumococcal 0-64 years (1 of 1 - PPSV23) 11/12/1996    Influenza Age 5 to Adult  08/01/2019   . Health Maintenance reviewed and discussed and ordered per Provider. Coordination of Care  1. Have you been to the ER, urgent care clinic since your last visit? Hospitalized since your last visit? No    2. Have you seen or consulted any other health care providers outside of the [facility] since your last visit? Include any pap smears or colon screening. No        Advance Directive:  1. Do you have an advance directive in place? Patient Reply:No     2. If not, would you like material regarding how to put one in place?  Patient Reply: No

## 2020-03-26 DIAGNOSIS — F90.2 ATTENTION DEFICIT HYPERACTIVITY DISORDER (ADHD), COMBINED TYPE: ICD-10-CM

## 2020-03-26 RX ORDER — DEXTROAMPHETAMINE SACCHARATE, AMPHETAMINE ASPARTATE, DEXTROAMPHETAMINE SULFATE AND AMPHETAMINE SULFATE 7.5; 7.5; 7.5; 7.5 MG/1; MG/1; MG/1; MG/1
30 TABLET ORAL 2 TIMES DAILY
Qty: 60 TAB | Refills: 0 | Status: SHIPPED | OUTPATIENT
Start: 2020-03-26

## 2020-03-26 NOTE — TELEPHONE ENCOUNTER
Pt stated that he was seen in the office on 3/23/2020 and stated that one refill was due to patient for ADHD refill, Please advise   Requested Prescriptions     Pending Prescriptions Disp Refills    dextroamphetamine-amphetamine (ADDERALL) 30 mg tablet [Pharmacy Med Name: DEXTROAMP-AMPHETAMIN 30 MG TAB] 60 Tab      Sig: TAKE 1 TABLET BY MOUTH TWO TIMES A DAY FOR 14 DAYS (MAX DAILY 2 TABLETS/ DAY)

## 2020-05-02 ENCOUNTER — HOSPITAL ENCOUNTER (EMERGENCY)
Age: 30
Discharge: HOME | End: 2020-05-03
Attending: EMERGENCY MEDICINE
Payer: MEDICAID

## 2020-05-02 ENCOUNTER — APPOINTMENT (OUTPATIENT)
Dept: CT IMAGING | Age: 30
End: 2020-05-02
Attending: PHYSICIAN ASSISTANT
Payer: MEDICAID

## 2020-05-02 VITALS
SYSTOLIC BLOOD PRESSURE: 115 MMHG | DIASTOLIC BLOOD PRESSURE: 59 MMHG | RESPIRATION RATE: 18 BRPM | OXYGEN SATURATION: 97 % | HEART RATE: 78 BPM | TEMPERATURE: 99.2 F

## 2020-05-02 DIAGNOSIS — R10.30 LOWER ABDOMINAL PAIN: ICD-10-CM

## 2020-05-02 DIAGNOSIS — K52.9 COLITIS: ICD-10-CM

## 2020-05-02 DIAGNOSIS — R19.7 DIARRHEA, UNSPECIFIED TYPE: ICD-10-CM

## 2020-05-02 DIAGNOSIS — R19.5 OCCULT BLOOD POSITIVE STOOL: Primary | ICD-10-CM

## 2020-05-02 LAB
ALBUMIN SERPL-MCNC: 4.2 G/DL (ref 3.4–5)
ALBUMIN/GLOB SERPL: 1.2 {RATIO} (ref 0.8–1.7)
ALP SERPL-CCNC: 92 U/L (ref 45–117)
ALT SERPL-CCNC: 67 U/L (ref 16–61)
ANION GAP SERPL CALC-SCNC: 5 MMOL/L (ref 3–18)
APTT PPP: 35.1 SEC (ref 23–36.4)
AST SERPL-CCNC: 29 U/L (ref 10–38)
BASOPHILS # BLD: 0 K/UL (ref 0–0.1)
BASOPHILS NFR BLD: 0 % (ref 0–2)
BILIRUB SERPL-MCNC: 0.4 MG/DL (ref 0.2–1)
BUN SERPL-MCNC: 10 MG/DL (ref 7–18)
BUN/CREAT SERPL: 10 (ref 12–20)
CALCIUM SERPL-MCNC: 8.8 MG/DL (ref 8.5–10.1)
CHLORIDE SERPL-SCNC: 107 MMOL/L (ref 100–111)
CO2 SERPL-SCNC: 28 MMOL/L (ref 21–32)
CREAT SERPL-MCNC: 1.04 MG/DL (ref 0.6–1.3)
DIFFERENTIAL METHOD BLD: ABNORMAL
EOSINOPHIL # BLD: 0.2 K/UL (ref 0–0.4)
EOSINOPHIL NFR BLD: 2 % (ref 0–5)
ERYTHROCYTE [DISTWIDTH] IN BLOOD BY AUTOMATED COUNT: 12.8 % (ref 11.6–14.5)
GLOBULIN SER CALC-MCNC: 3.5 G/DL (ref 2–4)
GLUCOSE SERPL-MCNC: 80 MG/DL (ref 74–99)
HCT VFR BLD AUTO: 46.3 % (ref 36–48)
HEMOCCULT STL QL: POSITIVE
HGB BLD-MCNC: 16 G/DL (ref 13–16)
INR PPP: 1 (ref 0.8–1.2)
LIPASE SERPL-CCNC: 90 U/L (ref 73–393)
LYMPHOCYTES # BLD: 2.2 K/UL (ref 0.9–3.6)
LYMPHOCYTES NFR BLD: 19 % (ref 21–52)
MCH RBC QN AUTO: 31.7 PG (ref 24–34)
MCHC RBC AUTO-ENTMCNC: 34.6 G/DL (ref 31–37)
MCV RBC AUTO: 91.7 FL (ref 74–97)
MONOCYTES # BLD: 1.3 K/UL (ref 0.05–1.2)
MONOCYTES NFR BLD: 11 % (ref 3–10)
NEUTS SEG # BLD: 7.9 K/UL (ref 1.8–8)
NEUTS SEG NFR BLD: 68 % (ref 40–73)
PLATELET # BLD AUTO: 306 K/UL (ref 135–420)
PMV BLD AUTO: 9.2 FL (ref 9.2–11.8)
POTASSIUM SERPL-SCNC: 3.7 MMOL/L (ref 3.5–5.5)
PROT SERPL-MCNC: 7.7 G/DL (ref 6.4–8.2)
PROTHROMBIN TIME: 13 SEC (ref 11.5–15.2)
RBC # BLD AUTO: 5.05 M/UL (ref 4.7–5.5)
SODIUM SERPL-SCNC: 140 MMOL/L (ref 136–145)
WBC # BLD AUTO: 11.7 K/UL (ref 4.6–13.2)

## 2020-05-02 PROCEDURE — 99284 EMERGENCY DEPT VISIT MOD MDM: CPT

## 2020-05-02 PROCEDURE — 74011250637 HC RX REV CODE- 250/637: Performed by: PHYSICIAN ASSISTANT

## 2020-05-02 PROCEDURE — 74011636320 HC RX REV CODE- 636/320: Performed by: EMERGENCY MEDICINE

## 2020-05-02 PROCEDURE — 85025 COMPLETE CBC W/AUTO DIFF WBC: CPT

## 2020-05-02 PROCEDURE — 85610 PROTHROMBIN TIME: CPT

## 2020-05-02 PROCEDURE — 82272 OCCULT BLD FECES 1-3 TESTS: CPT

## 2020-05-02 PROCEDURE — 80053 COMPREHEN METABOLIC PANEL: CPT

## 2020-05-02 PROCEDURE — 85730 THROMBOPLASTIN TIME PARTIAL: CPT

## 2020-05-02 PROCEDURE — 83690 ASSAY OF LIPASE: CPT

## 2020-05-02 PROCEDURE — 87177 OVA AND PARASITES SMEARS: CPT

## 2020-05-02 PROCEDURE — 74177 CT ABD & PELVIS W/CONTRAST: CPT

## 2020-05-02 RX ORDER — DICYCLOMINE HYDROCHLORIDE 10 MG/1
10 CAPSULE ORAL
Qty: 20 CAP | Refills: 0 | Status: SHIPPED | OUTPATIENT
Start: 2020-05-02 | End: 2020-05-07

## 2020-05-02 RX ORDER — CIPROFLOXACIN 500 MG/1
500 TABLET ORAL 2 TIMES DAILY
Qty: 14 TAB | Refills: 0 | Status: SHIPPED | OUTPATIENT
Start: 2020-05-02 | End: 2020-05-09

## 2020-05-02 RX ORDER — METRONIDAZOLE 500 MG/1
500 TABLET ORAL 2 TIMES DAILY
Qty: 14 TAB | Refills: 0 | Status: SHIPPED | OUTPATIENT
Start: 2020-05-02 | End: 2020-05-09

## 2020-05-02 RX ORDER — METRONIDAZOLE 500 MG/1
500 TABLET ORAL
Status: COMPLETED | OUTPATIENT
Start: 2020-05-02 | End: 2020-05-02

## 2020-05-02 RX ORDER — CIPROFLOXACIN 500 MG/1
500 TABLET ORAL
Status: COMPLETED | OUTPATIENT
Start: 2020-05-02 | End: 2020-05-02

## 2020-05-02 RX ADMIN — METRONIDAZOLE 500 MG: 500 TABLET ORAL at 23:27

## 2020-05-02 RX ADMIN — CIPROFLOXACIN HYDROCHLORIDE 500 MG: 500 TABLET, FILM COATED ORAL at 23:27

## 2020-05-02 RX ADMIN — IOPAMIDOL 100 ML: 612 INJECTION, SOLUTION INTRAVENOUS at 22:18

## 2020-05-02 NOTE — ED TRIAGE NOTES
Pt arrived via triage with complaints of abdominal pain since 7 am. Pt also had bright red bloody stool.

## 2020-05-03 NOTE — ED PROVIDER NOTES
EMERGENCY DEPARTMENT HISTORY AND PHYSICAL EXAM    Date: 5/2/2020  Patient Name: Lamont Auguste    History of Presenting Illness     Chief Complaint   Patient presents with    Abdominal Pain    Melena         History Provided By: patient     Chief Complaint: abd pain and bloody diarrhea   Duration: 1 day   Timing:  Acute   Location: lower abdomen and periumbilical   Quality: sharp pain  Severity: moderate  Modifying Factors:  None   Associated Symptoms: bloody diarrhea       Additional History (Context): Lamont Auguste is a 34 y.o. male with PMH ADD, bipolar disorder, hypertension, and fatty liver who presents with c/o 1 day of sharp lower abdominal and periumbilical pain with intermittent diarrhea. Pt states just before coming to the ED he had an episode of bloody diarrhea that seemed to relieve some of his abdominal pain. Denies fever, chills, and urinary sx. Pt states he ate [employer] and [employer] house yesterday. Expresses concerns for possible food poisoning. No other complaints reported at this time. PCP: Mike Rea NP    Current Outpatient Medications   Medication Sig Dispense Refill    ciprofloxacin HCl (Cipro) 500 mg tablet Take 1 Tab by mouth two (2) times a day for 7 days. 14 Tab 0    metroNIDAZOLE (FlagyL) 500 mg tablet Take 1 Tab by mouth two (2) times a day for 7 days. 14 Tab 0    dicyclomine (BentyL) 10 mg capsule Take 1 Cap by mouth every six (6) hours as needed for Abdominal Cramps (abd cramps) for up to 5 days. 20 Cap 0    dextroamphetamine-amphetamine (ADDERALL) 30 mg tablet Take 1 Tab by mouth two (2) times a day. Max Daily Amount: 2 Tabs. 60 Tab 0    losartan (COZAAR) 50 mg tablet Take 1 Tab by mouth daily. 90 Tab 0    famotidine (PEPCID) 40 mg tablet       meloxicam (MOBIC) 15 mg tablet       calcium carbonate-vitamin D3 (CALTRATE 600 PLUS D) 600 mg (1,500 mg)-800 unit chew Take 1 Tab by mouth daily.  30 Tab 1    diclofenac (VOLTAREN) 1 % gel Apply 4 g to affected area four (4) times daily. USE AS DIRECTED 100 g 1    LORazepam (ATIVAN) 0.5 mg tablet Take 1 Tab by mouth two (2) times daily as needed for Anxiety for up to 30 days. Max Daily Amount: 1 mg. Indications: anxious 60 Tab 1    albuterol (PROVENTIL HFA, VENTOLIN HFA, PROAIR HFA) 90 mcg/actuation inhaler Take 1-2 Puffs by inhalation every four (4) hours as needed for Wheezing. 1 Inhaler 0       Past History     Past Medical History:  Past Medical History:   Diagnosis Date    ADD (attention deficit disorder)     Back injury     Back problem     Bipolar affective disorder ([facility] [address]     Depression     Fatty liver     Fractures     3 slip disk;     History of ear infections     Hypertension     Positive urine drug screen 10/25/2018    +cocaine and thc     Suicidal ideation        Past Surgical History:  Past Surgical History:   Procedure Laterality Date    HX TYMPANOSTOMY         Family History:  Family History   Problem Relation Age of Onset    Cancer Father         mesothelioma    Heart Disease Maternal Grandmother     Diabetes Maternal Grandmother        Social History:  Social History     Tobacco Use    Smoking status: Current Every Day Smoker     Packs/day: 1.00     Years: 12.00     Pack years: 12.00    Smokeless tobacco: Current User   Substance Use Topics    Alcohol use: Yes     Alcohol/week: 6.7 standard drinks     Types: 8 Cans of beer per week     Frequency: Monthly or less     Drinks per session: 5 or 6     Binge frequency: Less than monthly     Comment: social    Drug use: Yes     Types: Marijuana       Allergies: Allergies   Allergen Reactions    Amoxicillin Swelling, Itching, Shortness of Breath and Unknown (comments)         Review of Systems   Review of Systems   Constitutional: Negative. Negative for chills and fever. HENT: Negative. Negative for congestion, ear pain and rhinorrhea. Eyes: Negative. Negative for pain and redness. Respiratory: Negative.   Negative for cough, shortness of breath, wheezing and stridor. Cardiovascular: Negative. Negative for chest pain and leg swelling. Gastrointestinal: Positive for abdominal pain, blood in stool and diarrhea. Negative for constipation, nausea and vomiting. Genitourinary: Negative. Negative for dysuria and frequency. Musculoskeletal: Negative. Negative for back pain and neck pain. Skin: Negative. Negative for rash and wound. Neurological: Negative. Negative for dizziness, seizures, syncope and headaches. All other systems reviewed and are negative. All Other Systems Negative  Physical Exam     Vitals:    05/02/20 1857 05/02/20 2035 05/02/20 2115 05/02/20 2302   BP: (!) 153/97 153/86 (!) 153/92 115/59   Pulse: 93 87 83 78   Resp: 16 21 15 18   Temp: 99.2 °F (37.3 °C)      SpO2: 99% 98% 98% 97%     Physical Exam  Vitals signs and nursing note reviewed. Constitutional:       General: He is not in acute distress. Appearance: He is well-developed. He is obese. He is not diaphoretic. HENT:      Head: Normocephalic and atraumatic. Eyes:      General: No scleral icterus. Right eye: No discharge. Left eye: No discharge. Conjunctiva/sclera: Conjunctivae normal.   Neck:      Musculoskeletal: Normal range of motion and neck supple. Cardiovascular:      Rate and Rhythm: Normal rate and regular rhythm. Heart sounds: Normal heart sounds. No murmur. No friction rub. No gallop. Pulmonary:      Effort: Pulmonary effort is normal. No respiratory distress. Breath sounds: Normal breath sounds. No stridor. No wheezing or rales. Abdominal:      General: Bowel sounds are normal. There is no distension. Palpations: Abdomen is soft. There is no mass. Tenderness: There is abdominal tenderness. There is no guarding or rebound. Hernia: No hernia is present.       Comments: Mild periumbilical TTP noted w/o guarding or peritoneal signs noted   Musculoskeletal: Normal range of motion. Skin:     General: Skin is warm and dry. Findings: No erythema or rash. Neurological:      Mental Status: He is alert and oriented to person, place, and time. Coordination: Coordination normal.      Comments: Gait is steady and patient exhibits no evidence of ataxia. Patient is able to ambulate without difficulty. No focal neurological deficit noted. No facial droop, slurred speech, or evidence of altered mentation noted on exam.     Psychiatric:         Behavior: Behavior normal.         Thought Content: Thought content normal.                Diagnostic Study Results     Labs -     Recent Results (from the past 12 hour(s))   CBC WITH AUTOMATED DIFF    Collection Time: 05/02/20  7:12 PM   Result Value Ref Range    WBC 11.7 4.6 - 13.2 K/uL    RBC 5.05 4.70 - 5.50 M/uL    HGB 16.0 13.0 - 16.0 g/dL    HCT 46.3 36.0 - 48.0 %    MCV 91.7 74.0 - 97.0 FL    MCH 31.7 24.0 - 34.0 PG    MCHC 34.6 31.0 - 37.0 g/dL    RDW 12.8 11.6 - 14.5 %    PLATELET 762 232 - 221 K/uL    MPV 9.2 9.2 - 11.8 FL    NEUTROPHILS 68 40 - 73 %    LYMPHOCYTES 19 (L) 21 - 52 %    MONOCYTES 11 (H) 3 - 10 %    EOSINOPHILS 2 0 - 5 %    BASOPHILS 0 0 - 2 %    ABS. NEUTROPHILS 7.9 1.8 - 8.0 K/UL    ABS. LYMPHOCYTES 2.2 0.9 - 3.6 K/UL    ABS. MONOCYTES 1.3 (H) 0.05 - 1.2 K/UL    ABS. EOSINOPHILS 0.2 0.0 - 0.4 K/UL    ABS.  BASOPHILS 0.0 0.0 - 0.1 K/UL    DF AUTOMATED     METABOLIC PANEL, COMPREHENSIVE    Collection Time: 05/02/20  7:12 PM   Result Value Ref Range    Sodium 140 136 - 145 mmol/L    Potassium 3.7 3.5 - 5.5 mmol/L    Chloride 107 100 - 111 mmol/L    CO2 28 21 - 32 mmol/L    Anion gap 5 3.0 - 18 mmol/L    Glucose 80 74 - 99 mg/dL    BUN 10 7.0 - 18 MG/DL    Creatinine 1.04 0.6 - 1.3 MG/DL    BUN/Creatinine ratio 10 (L) 12 - 20      GFR est AA >60 >60 ml/min/1.73m2    GFR est non-AA >60 >60 ml/min/1.73m2    Calcium 8.8 8.5 - 10.1 MG/DL    Bilirubin, total 0.4 0.2 - 1.0 MG/DL    ALT (SGPT) 67 (H) 16 - 61 U/L    AST (SGOT) 29 10 - 38 U/L    Alk. phosphatase 92 45 - 117 U/L    Protein, total 7.7 6.4 - 8.2 g/dL    Albumin 4.2 3.4 - 5.0 g/dL    Globulin 3.5 2.0 - 4.0 g/dL    A-G Ratio 1.2 0.8 - 1.7     LIPASE    Collection Time: 05/02/20  7:12 PM   Result Value Ref Range    Lipase 90 73 - 393 U/L   PROTHROMBIN TIME + INR    Collection Time: 05/02/20  7:12 PM   Result Value Ref Range    Prothrombin time 13.0 11.5 - 15.2 sec    INR 1.0 0.8 - 1.2     PTT    Collection Time: 05/02/20  7:12 PM   Result Value Ref Range    aPTT 35.1 23.0 - 36.4 SEC   OCCULT BLOOD, STOOL    Collection Time: 05/02/20  8:55 PM   Result Value Ref Range    Occult blood, stool Positive (A) NEG         Radiologic Studies -   CT ABD PELV W CONT   Final Result   IMPRESSION:       Empty transverse colon and proximal to mid descending colon. Possible mild   circumferential bowel wall thickening vs. simply under luminal distention. Cannot exclude colitis. No definite surrounding inflammation. Stable hepatomegaly with steatosis. Tiny umbilical hernia contains fat. Report provided to the emergency department at 2227 hrs. CT Results  (Last 48 hours)               05/02/20 2218  CT ABD PELV W CONT Final result    Impression:  IMPRESSION:        Empty transverse colon and proximal to mid descending colon. Possible mild   circumferential bowel wall thickening vs. simply under luminal distention. Cannot exclude colitis. No definite surrounding inflammation. Stable hepatomegaly with steatosis. Tiny umbilical hernia contains fat. Report provided to the emergency department at 2227 hrs. Narrative:  EXAM: CT ABDOMEN AND PELVIS WITH CONTRAST       CLINICAL HISTORY/INDICATION:  bloody diarrhea, lower abd pain and periumbilical   pain described as sharp, onset day of arrival        COMPARISON: CT abdomen and pelvis 11/26/2019.        TECHNIQUE: Following the uneventful intravenous administration of 100 cc of   nonionic contrast, dynamic enhanced scanning of the abdomen and pelvis was   performed using standard 5 mm axial images. Coronal and sagittal reformations   obtained. All CT scans at this facility are performed using dose optimization technique as   appropriate to a performed exam, to include automated exposure control,   adjustment of the mA and/or kV according to patient's size (including   appropriate matching for site-specific examinations), or use of iterative   reconstruction technique. FINDINGS:        Abdomen -       The lung bases are clear; no pleural fluid or pneumothorax evident. The included portion of the chest wall and the abdominal wall  is unremarkable. The liver is enlarged with coronal measurement of 19.8 cm. Density measurement   of 45 Hounsfield units consistent with diffuse steatosis. Gallbladder   unremarkable. Spleen normal.       There is bilateral renal function without obstruction, without cyst, stone or   mass. Adrenals and Pancreas  are of normal CT appearance. There is no free fluid or free air. There is gas and stool in the colon from the cecum to the distal ascending   colon. The distal ascending colon, transverse colon to the level of the mid   descending colon is relatively empty. The more distal colon contains scattered   gas. The appendix is normal. Small bowel is unremarkable. There is no significant adenopathy. Pelvis -       There is no free pelvic fluid. The pelvic viscera are unremarkable. The bladder is incompletely distended but unremarkable. There is no significant adenopathy. Review of osseous structures throughout on bone window settings shows no   significant osseous pathology. CXR Results  (Last 48 hours)    None            Medical Decision Making   I am the first provider for this patient.     I reviewed the vital signs, available nursing notes, past medical history, past surgical history, family history and social history. Vital Signs-Reviewed the patient's vital signs. Records Reviewed: Trupti Rehman PA-C     Procedures:  Procedures    Provider Notes (Medical Decision Making): Impression: abd pain, hemoccult positive stool, colitis     Occult stool positive, stool culture sent out. Labs essentially normal with the exception of ALT of 67. CT scan shows under luminal distention vs mild colitis. Given the pt's diarrhea, abd pain, and bloody stool will plan to cover with abx for colitis and close GI follow-up. Pt agrees. Trupti Rehman PA-C     MED RECONCILIATION:  No current facility-administered medications for this encounter. Current Outpatient Medications   Medication Sig    ciprofloxacin HCl (Cipro) 500 mg tablet Take 1 Tab by mouth two (2) times a day for 7 days.  metroNIDAZOLE (FlagyL) 500 mg tablet Take 1 Tab by mouth two (2) times a day for 7 days.  dicyclomine (BentyL) 10 mg capsule Take 1 Cap by mouth every six (6) hours as needed for Abdominal Cramps (abd cramps) for up to 5 days.  dextroamphetamine-amphetamine (ADDERALL) 30 mg tablet Take 1 Tab by mouth two (2) times a day. Max Daily Amount: 2 Tabs.  losartan (COZAAR) 50 mg tablet Take 1 Tab by mouth daily.  famotidine (PEPCID) 40 mg tablet     meloxicam (MOBIC) 15 mg tablet     calcium carbonate-vitamin D3 (CALTRATE 600 PLUS D) 600 mg (1,500 mg)-800 unit chew Take 1 Tab by mouth daily.  diclofenac (VOLTAREN) 1 % gel Apply 4 g to affected area four (4) times daily. USE AS DIRECTED    LORazepam (ATIVAN) 0.5 mg tablet Take 1 Tab by mouth two (2) times daily as needed for Anxiety for up to 30 days. Max Daily Amount: 1 mg. Indications: anxious    albuterol (PROVENTIL HFA, VENTOLIN HFA, PROAIR HFA) 90 mcg/actuation inhaler Take 1-2 Puffs by inhalation every four (4) hours as needed for Wheezing. Disposition:  D/c    DISCHARGE NOTE:   Patient is stable for discharge at this time.  I have discussed all the findings from today's work up with the patient, including lab results and imaging. I have answered all questions. Rx for cipro and flagyl given. Rest and close follow-up with the GI specialist recommended this week. Return to the ED immediately for any new or worsening symptoms. Trupti Rehman PA-C     Follow-up Information     Follow up With Specialties Details Why Contact Info    Chito Trinh MD Gastroenterology Schedule an appointment as soon as possible for a visit in 3 days  [address] [address]      [facility] EMERGENCY DEPT Emergency Medicine  As needed, If symptoms worsen [address] [facility] [address]  [phone]          Current Discharge Medication List      START taking these medications    Details   ciprofloxacin HCl (Cipro) 500 mg tablet Take 1 Tab by mouth two (2) times a day for 7 days. Qty: 14 Tab, Refills: 0      metroNIDAZOLE (FlagyL) 500 mg tablet Take 1 Tab by mouth two (2) times a day for 7 days. Qty: 14 Tab, Refills: 0      dicyclomine (BentyL) 10 mg capsule Take 1 Cap by mouth every six (6) hours as needed for Abdominal Cramps (abd cramps) for up to 5 days. Qty: 20 Cap, Refills: 0                 Diagnosis     Clinical Impression:   1. Occult blood positive stool    2. Lower abdominal pain    3. Diarrhea, unspecified type    4.  Colitis

## 2020-05-03 NOTE — DISCHARGE INSTRUCTIONS
Patient Education     Colitis: Care Instructions  Your Care Instructions  Colitis is the medical term for swelling (inflammation) of the intestine. It can be caused by different things, such as an infection or loss of blood flow in the intestine. Other causes are problems like Crohn's disease or ulcerative colitis. Symptoms may include fever, diarrhea that may be bloody, or belly pain. Sometimes symptoms go away without treatment. But you may need treatment or more tests, such as blood tests or a stool test. Or you may need imaging tests like a CT scan or a colonoscopy. In some cases, the doctor may want to test a sample of tissue from the intestine. This test is called a biopsy. The doctor has checked you carefully, but problems can develop later. If you notice any problems or new symptoms, get medical treatment right away. Follow-up care is a key part of your treatment and safety. Be sure to make and go to all appointments, and call your doctor if you are having problems. It's also a good idea to know your test results and keep a list of the medicines you take. How can you care for yourself at home? · Rest until you feel better. · Your doctor may recommend that you eat bland foods. These include rice, dry toast or crackers, bananas, and applesauce. · To prevent dehydration, drink plenty of fluids. Choose water and other caffeine-free clear liquids until you feel better. If you have kidney, heart, or liver disease and have to limit fluids, talk with your doctor before you increase the amount of fluids you drink. · Be safe with medicines. Take your medicines exactly as prescribed. Call your doctor if you think you are having a problem with your medicine. You will get more details on the specific medicines your doctor prescribes. When should you call for help? Call 911 anytime you think you may need emergency care. For example, call if:  · You passed out (lost consciousness).   · You vomit blood or what looks like coffee grounds. · Your stools are maroon or very bloody. Call your doctor now or seek immediate medical care if:  · You have new or worse pain. · You have a new or higher fever. · You have new or worse symptoms. · You cannot keep fluids or medicines down. Watch closely for changes in your health, and be sure to contact your doctor if:  · You do not get better as expected. Where can you learn more? Go to [employer]  Enter [id number] in the search box to learn more about \"Colitis: Care Instructions. \"   © 7272-3678 Healthwise, [employer]. Care instructions adapted under license by [address] [place] [address] (which disclaims liability or warranty for this information). This care instruction is for use with your licensed healthcare professional. If you have questions about a medical condition or this instruction, always ask your healthcare professional. [place] [address] any warranty or liability for your use of this information. Content Version: 00.2.648227; Current as of: November 20, 2015           Patient Education        Abdominal Pain: Care Instructions  Your Care Instructions    Abdominal pain has many possible causes. Some aren't serious and get better on their own in a few days. Others need more testing and treatment. If your pain continues or gets worse, you need to be rechecked and may need more tests to find out what is wrong. You may need surgery to correct the problem. Don't ignore new symptoms, such as fever, nausea and vomiting, urination problems, pain that gets worse, and dizziness. These may be signs of a more serious problem. Your doctor may have recommended a follow-up visit in the next 8 to 12 hours. If you are not getting better, you may need more tests or treatment. The doctor has checked you carefully, but problems can develop later. If you notice any problems or new symptoms, get medical treatment right away.   Follow-up care is a key part of your treatment and safety. Be sure to make and go to all appointments, and call your doctor if you are having problems. It's also a good idea to know your test results and keep a list of the medicines you take. How can you care for yourself at home? · Rest until you feel better. · To prevent dehydration, drink plenty of fluids, enough so that your urine is light yellow or clear like water. Choose water and other caffeine-free clear liquids until you feel better. If you have kidney, heart, or liver disease and have to limit fluids, talk with your doctor before you increase the amount of fluids you drink. · If your stomach is upset, eat mild foods, such as rice, dry toast or crackers, bananas, and applesauce. Try eating several small meals instead of two or three large ones. · Wait until 48 hours after all symptoms have gone away before you have spicy foods, alcohol, and drinks that contain caffeine. · Do not eat foods that are high in fat. · Avoid anti-inflammatory medicines such as aspirin, ibuprofen (Advil, Motrin), and naproxen (Aleve). These can cause stomach upset. Talk to your doctor if you take daily aspirin for another health problem. When should you call for help? Call 911 anytime you think you may need emergency care. For example, call if:    · You passed out (lost consciousness).     · You pass maroon or very bloody stools.     · You vomit blood or what looks like coffee grounds.     · You have new, severe belly pain.    Call your doctor now or seek immediate medical care if:    · Your pain gets worse, especially if it becomes focused in one area of your belly.     · You have a new or higher fever.     · Your stools are black and look like tar, or they have streaks of blood.     · You have unexpected vaginal bleeding.     · You have symptoms of a urinary tract infection. These may include:  ? Pain when you urinate. ? Urinating more often than usual.  ?  Blood in your urine.     · You are dizzy or lightheaded, or you feel like you may faint.    Watch closely for changes in your health, and be sure to contact your doctor if:    · You are not getting better after 1 day (24 hours). Where can you learn more? Go to http://selina-amrit.info/  Enter B123 in the search box to learn more about \"Abdominal Pain: Care Instructions. \"  Current as of: 2019Content Version: 12.4  © 9256-6436 Healthwise, [employer]. Care instructions adapted under license by [employer] (which disclaims liability or warranty for this information). If you have questions about a medical condition or this instruction, always ask your healthcare professional. [address] any warranty or liability for your use of this information. Workbooks Activation    Thank you for requesting access to Workbooks. Please follow the instructions below to securely access and download your online medical record. Workbooks allows you to send messages to your doctor, view your test results, renew your prescriptions, schedule appointments, and more. How Do I Sign Up? 1. In your internet browser, go to [email or web address]  2. Click on the First Time User? Click Here link in the Sign In box. You will be redirect to the New Member Sign Up page. 3. Enter your Workbooks Access Code exactly as it appears below. You will not need to use this code after youve completed the sign-up process. If you do not sign up before the expiration date, you must request a new code. Workbooks Access Code: Activation code not generated  Current Workbooks Status: Active (This is the date your Workbooks access code will )    4. Enter the last four digits of your Social Security Number (xxxx) and Date of Birth (mm/dd/yyyy) as indicated and click Submit. You will be taken to the next sign-up page. 5. Create a Workbooks ID.  This will be your Workbooks login ID and cannot be changed, so think of one that is secure and easy to remember. 6. Create a [employer] password. You can change your password at any time. 7. Enter your Password Reset Question and Answer. This can be used at a later time if you forget your password. 8. Enter your e-mail address. You will receive e-mail notification when new information is available in [address] 9. Click Sign Up. You can now view and download portions of your medical record. 10. Click the Download Summary menu link to download a portable copy of your medical information. Additional Information    If you have questions, please visit the Frequently Asked Questions section of the [employer] website at [email or web address] Remember, [employer] is NOT to be used for urgent needs. For medical emergencies, dial 911.

## 2020-05-06 LAB
O+P SPEC MICRO: NORMAL
O+P STL CONC: NORMAL
SPECIMEN SOURCE: NORMAL

## 2020-07-31 NOTE — ANCILLARY DISCHARGE INSTRUCTIONS
[facility] [address] [facility] 
[phone] fax Physical Therapy Discharge Summary Patient name: Shana Tavares Start of Care: 2020 Referral source: Kim Guajardo MD : 1990 Medical/Treatment Diagnosis: Right ankle pain [M25.571] Sprain of unspecified ligament of right ankle, subsequent encounter [T53.014F] Payor: [facility] MEDICAID / Plan: [place] [employer] / Product Type: Managed Care Medicaid /  Onset Date:2019 
   
Prior Hospitalization: see medical history Provider#: [id number] Medications: Verified on Patient Summary List    
Comorbidities: tobacco use, HTN, recent weight gain, chronic LBP, bipolar disorder per MD note, hx 1-2 ankle fractures and several sprains in his right ankle in the past.  
Prior Level of Function:Independent with ADLs, functional, and work tasks with intermittent LBP prior to the initial onset.  
 
Visits from Start of Care: 7    Missed Visits: 3 Reporting Period : 3/2/20 to 3/2/20 Last Progress note. Summary of Care: 1. Pt will report compliance and independence to HEP to help the pt manage their pain and symptoms.            
Goal met. 2020  
Long Term Goals: To be accomplished in 10 treatments: 1. Pt will increase FOTO score to 62 points to improve ability to perform ADLs. Eval: 44 points 2. Pt will report an improvement in at worst pain to 6/10 to improve ability to ambulate community distances with more ease. Progressing. Had 4/10 pain today. 20 Not met 4-5/10  
MET 3. Pt will increase AROM right ankle DF to 5 degs, PF to 55 degs, INV to 35 degs to improve ability to tolerate work activities. Eval: ankle DF 0 degs, PF 42 degs, INV 21 degs Improving but not formally measured >0 degrees DF Progressing AROM right ankle DF: 22 degrees AROM right ankle PF: 30 degrees AROM right ankle inversion: 30 degrees 4. Pt will report being able to walk 2 block with no difficulty secondary to right foot/ankle pain to improve ability to perform functional activities.  
Eval: quite a bit of difficulty per [facility] Increased pain ASSESSMENT/RECOMMENDATIONS:Mr. Jose Alfredo Estrada is progressing in PT in 7 visits with increased AROM of the right ankle. He still averages 4/10 pain due to instability and joint stiffness. He struggles with prolonged ambulation and walking on uneven surfaces. He continues to demonstrate a pelvic obliquity likely due to hip weakness and uneven weight shifting due to left CAM boot and pain. Pt did not return to PT since last visit and Progress note on 3/2/20. Pt is DC'd. 
  
[x]Discontinue therapy: []Patient has reached or is progressing toward set goals []Patient is non-compliant or has abdicated 
    []Due to lack of appreciable progress towards set goals Hebert Fang, PT 7/31/2020 10:43 AM

## 2020-09-09 ENCOUNTER — TELEPHONE (OUTPATIENT)
Dept: FAMILY MEDICINE CLINIC | Age: 30
End: 2020-09-09

## 2020-09-10 NOTE — TELEPHONE ENCOUNTER
He did not answer when I tried to call him (2) I will make him an appointment and also give him the e visit option.

## 2020-10-20 PROCEDURE — 99284 EMERGENCY DEPT VISIT MOD MDM: CPT

## 2020-10-21 ENCOUNTER — APPOINTMENT (OUTPATIENT)
Dept: CT IMAGING | Age: 30
End: 2020-10-21
Attending: EMERGENCY MEDICINE
Payer: MEDICAID

## 2020-10-21 ENCOUNTER — HOSPITAL ENCOUNTER (EMERGENCY)
Age: 30
Discharge: HOME | End: 2020-10-21
Attending: EMERGENCY MEDICINE
Payer: MEDICAID

## 2020-10-21 VITALS
HEART RATE: 63 BPM | TEMPERATURE: 98.1 F | DIASTOLIC BLOOD PRESSURE: 79 MMHG | RESPIRATION RATE: 16 BRPM | SYSTOLIC BLOOD PRESSURE: 144 MMHG | OXYGEN SATURATION: 98 %

## 2020-10-21 DIAGNOSIS — R42 EPISODIC LIGHTHEADEDNESS: Primary | ICD-10-CM

## 2020-10-21 LAB
ATRIAL RATE: 61 BPM
CALCULATED P AXIS, ECG09: 27 DEGREES
CALCULATED R AXIS, ECG10: 28 DEGREES
CALCULATED T AXIS, ECG11: 45 DEGREES
DIAGNOSIS, 93000: NORMAL
P-R INTERVAL, ECG05: 178 MS
Q-T INTERVAL, ECG07: 434 MS
QRS DURATION, ECG06: 102 MS
QTC CALCULATION (BEZET), ECG08: 436 MS
VENTRICULAR RATE, ECG03: 61 BPM

## 2020-10-21 PROCEDURE — 70450 CT HEAD/BRAIN W/O DYE: CPT

## 2020-10-21 PROCEDURE — 93005 ELECTROCARDIOGRAM TRACING: CPT

## 2020-10-21 NOTE — ED TRIAGE NOTES
Patient arrived to ED via EMS complaining of a pressure headache and numbness and tingling all over especially in his extremities. The patient's symptoms began approximately one hour ago. Patient has a history of HTN and states he has not been taking his hypertension medication for approximately 6 months but his pressure has been \"fine. \" Upon arrival to ED patient stated he was experiencing some blurred vision because of his pressure headache.

## 2020-10-21 NOTE — ED NOTES
I have reviewed discharge instructions with the patient. The patient verbalized understanding of the instructions and the importance of scheduling a follow up appointment. The patient ambulated to the [facility] without assistance and in stable condition.

## 2020-10-21 NOTE — ED PROVIDER NOTES
EMERGENCY DEPARTMENT HISTORY AND PHYSICAL EXAM      Date: 10/21/2020  Patient Name: Chad Nicole    History of Presenting Illness     Chief Complaint   Patient presents with    Headache     pressure    Numbness       History (Context): Chad Nicole is a 34 y.o. gentleman, who presents unexplained sudden onset, severe, constant, lightheadedness started an hour ago. This sensation feels like the patient is about to pass out. The patient did not lose consciousness. This has  caused gait instability. This is associated with numbness and weakness in all 4 extremities. No other clear exacerbating/relieving features or other associated symptoms. On review of systems, the patient denies fever, chills, recent trauma trauma, neck pain, chest pain, back pain, abdominal pain, vomiting, diaphoresis, seizure-like activity, numbness, weakness, tingling. PCP: Jeff Da Silva NP    Current Outpatient Medications   Medication Sig Dispense Refill    dextroamphetamine-amphetamine (ADDERALL) 30 mg tablet Take 1 Tab by mouth two (2) times a day. Max Daily Amount: 2 Tabs. 60 Tab 0    losartan (COZAAR) 50 mg tablet Take 1 Tab by mouth daily. 90 Tab 0    famotidine (PEPCID) 40 mg tablet       meloxicam (MOBIC) 15 mg tablet       calcium carbonate-vitamin D3 (CALTRATE 600 PLUS D) 600 mg (1,500 mg)-800 unit chew Take 1 Tab by mouth daily. 30 Tab 1    diclofenac (VOLTAREN) 1 % gel Apply 4 g to affected area four (4) times daily. USE AS DIRECTED 100 g 1    LORazepam (ATIVAN) 0.5 mg tablet Take 1 Tab by mouth two (2) times daily as needed for Anxiety for up to 30 days. Max Daily Amount: 1 mg. Indications: anxious 60 Tab 1    albuterol (PROVENTIL HFA, VENTOLIN HFA, PROAIR HFA) 90 mcg/actuation inhaler Take 1-2 Puffs by inhalation every four (4) hours as needed for Wheezing.  1 Inhaler 0       Past History     Past Medical History:  Past Medical History:   Diagnosis Date    ADD (attention deficit disorder)  Back injury     Back problem     Bipolar affective disorder ([address]     Depression     Fatty liver     Fractures     3 slip disk;     History of ear infections     Hypertension     Positive urine drug screen 10/25/2018    +cocaine and thc     Suicidal ideation        Past Surgical History:  Past Surgical History:   Procedure Laterality Date    HX TYMPANOSTOMY         Family History:  Family History   Problem Relation Age of Onset    Cancer Father         mesothelioma    Heart Disease Maternal Grandmother     Diabetes Maternal Grandmother        Social History:  Social History     Tobacco Use    Smoking status: Current Every Day Smoker     Packs/day: 1.00     Years: 12.00     Pack years: 12.00    Smokeless tobacco: Current User   Substance Use Topics    Alcohol use: Yes     Alcohol/week: 6.7 standard drinks     Types: 8 Cans of beer per week     Frequency: Monthly or less     Drinks per session: 5 or 6     Binge frequency: Less than monthly     Comment: social    Drug use: Yes     Types: Marijuana       Allergies: Allergies   Allergen Reactions    Amoxicillin Swelling, Itching, Shortness of Breath and Unknown (comments)       PMH, PSH, family history, social history, allergies reviewed with the patient with significant items noted above. Review of Systems   As per HPI, otherwise reviewed and negative. Physical Exam     Vitals:    10/21/20 0230 10/21/20 0305 10/21/20 0345 10/21/20 0400   BP: 136/70 (!) 146/88 130/77 (!) 144/79   Pulse: 62  71 63   Resp: 16  18 16   Temp:       SpO2: 97%  98% 98%       Gen: Well-appearing, in no acute distress  HEENT: Normocephalic, sclera anicteric  Cardiovascular: Normal rate, regular rhythm, no murmurs, rubs, gallops. Pulses intact and equal distally. Pulmonary: No respiratory distress. No stridor. Clear lungs. ABD: Soft, nontender, nondistended. Neuro: Alert. Normal speech. Normal mentation. No dysarthria. No resting nystagmus.   Full strength throughout. Mildly diminished sensation throughout. Cerebellar exam is normal: Rapid alternating motions, finger-nose-finger, heel shin. Gait steady. Psych: Normal thought content and thought processes. : No CVA tenderness  EXT: No swelling. Moves all extremities well. No cyanosis or clubbing. Skin: Warm and well-perfused. Diagnostic Study Results     Labs -   No results found for this or any previous visit (from the past 12 hour(s)). Radiologic Studies -   CT HEAD WO CONT   Final Result   Impression:      1. No acute intracranial pathology. CT Results  (Last 48 hours)    None        CXR Results  (Last 48 hours)    None            Medical Decision Making   I am the first provider for this patient. I reviewed the vital signs, available nursing notes, past medical history, past surgical history, family history and social history. Vital Signs-Reviewed the patient's vital signs. EKG: Interpreted by myself, as noted below in ED course. No evidence of long/short QT, short IA interval, WPW, high degree heart block, ARVD, HOCM, Brugada syndrome, frequent PVCs. Records Reviewed: Personally, on initial evaluation    MDM:   Patient presents with lightheadedness. The symptoms have resolved. Exam significant for normal exam including orthostatic vital signs. No cerebellar signs. HINTS exam suggestive of peripheral etiology. Symptoms are not constant and are exacerbated by positioning.   DDX considered: Presyncope, metabolic abnormality, anemia, orthostatic hypotension, vasovagal, arrhythmia, anxiety  DDX thought to be less likely but also considered due to high risk condition: Stroke, vertigo  Patient condition on initial evaluation: Stable    Plan:   Close Observation  Cardiac monitoring  As per orders noted below:  Orders Placed This Encounter    CT HEAD WO CONT    EKG, 12 LEAD, INITIAL        ED Course:   ED Course as of Oct 25 1811   Wed Oct 21, 2020   0340 Negative for acute intracranial pathology   CT HEAD WO CONT [DT]   0340 Patient remains asymptomatic on reevaluation, will discharge home. [DT]      ED Course User Index  [DT] Mathew Brown MD         Patient condition at time of disposition: Stable  DISCHARGE NOTE:   Pt has been reexamined. Patient has no new complaints, changes, or physical findings. Care plan outlined and precautions discussed. Results were reviewed with the patient. All medications were reviewed with the patient; will d/c home with no changes to meds. All of pt's questions and concerns were addressed. Alarm symptoms and return precautions associated with chief complaint and evaluation were reviewed with the patient in detail. The patient demonstrated adequate understanding. Patient was instructed and agrees to follow up with PCP, as well as to return to the ED upon further deterioration. Patient is ready to go home. The patient is happy with this plan    Follow-up Information     Follow up With Specialties Details Why [address] [facility] EMERGENCY DEPT Emergency Medicine  As needed, If symptoms worsen [address] [facility] [id number]  [phone]    Antonette Butcher NP Nurse Practitioner  As needed, If symptoms worsen [address]  [phone]            Discharge Medication List as of 10/21/2020  3:41 AM                Diagnosis     Clinical Impression:   1. Episodic lightheadedness        [place],  Valentin Wen MD  Emergency Physician  [facility]    As a voice dictation software was utilized to dictate this note, minor word transpositions can occur. I apologize for confusing wording and typographic errors. Please feel free to contact me for clarification.

## 2020-10-21 NOTE — DISCHARGE INSTRUCTIONS
Patient Education        Vertigo: Care Instructions  Your Care Instructions     Vertigo is the feeling that you or your surroundings are moving when there is no actual movement. It is often described as a feeling of spinning, whirling, falling, or tilting. Vertigo may make you vomit or feel nauseated. You may have trouble standing or walking and may lose your balance. Vertigo is often related to an inner ear problem, but it can have other more serious causes. If vertigo continues, you may need more tests to find its cause. Follow-up care is a key part of your treatment and safety. Be sure to make and go to all appointments, and call your doctor if you are having problems. It's also a good idea to know your test results and keep a list of the medicines you take. How can you care for yourself at home? · Do not lie flat on your back. Prop yourself up slightly. This may reduce the spinning feeling. Keep your eyes open. · Move slowly so that you do not fall. · If your doctor recommends medicine, take it exactly as directed. · Do not drive while you are having vertigo. Certain exercises, called Atwood-Daroff exercises, can help decrease vertigo. To do Atwood-Daroff exercises:  · Sit on the edge of a bed or sofa and quickly lie down on the side that causes the worst vertigo. Lie on your side with your ear down. · Stay in this position for at least 30 seconds or until the vertigo goes away. · Sit up. If this causes vertigo, wait for it to stop. · Repeat the procedure on the other side. · Repeat this 10 times. Do these exercises 2 times a day until the vertigo is gone. When should you call for help? Call 911 anytime you think you may need emergency care. For example, call if:    · You passed out (lost consciousness).     · You have symptoms of a stroke. These may include:  ? Sudden numbness, tingling, weakness, or loss of movement in your face, arm, or leg, especially on only one side of your body.   ? Sudden vision changes. ? Sudden trouble speaking. ? Sudden confusion or trouble understanding simple statements. ? Sudden problems with walking or balance. ? A sudden, severe headache that is different from past headaches. Call your doctor now or seek immediate medical care if:    · Vertigo occurs with a fever, a headache, or ringing in your ears.     · You have new or increased nausea and vomiting. Watch closely for changes in your health, and be sure to contact your doctor if:    · Vertigo gets worse or happens more often.     · Vertigo has not gotten better after 2 weeks. Where can you learn more? Go to http://www.gray.com/  Enter M758 in the search box to learn more about \"Vertigo: Care Instructions. \"  Current as of: April 15, 2020               Content Version: 12.6  © 2989-9741 [employer]. Care instructions adapted under license by [employer] (which disclaims liability or warranty for this information). If you have questions about a medical condition or this instruction, always ask your healthcare professional. [place] [address] any warranty or liability for your use of this information. Patient Education        Dizziness: Care Instructions  Your Care Instructions  Dizziness is the feeling of unsteadiness or fuzziness in your head. It is different than having vertigo, which is a feeling that the room is spinning or that you are moving or falling. It is also different from lightheadedness, which is the feeling that you are about to faint. It can be hard to know what causes dizziness. Some people feel dizzy when they have migraine headaches. Sometimes bouts of flu can make you feel dizzy. Some medical conditions, such as heart problems or high blood pressure, can make you feel dizzy. Many medicines can cause dizziness, including medicines for high blood pressure, pain, or anxiety.   If a medicine causes your symptoms, your doctor may recommend that you stop or change the medicine. If it is a problem with your heart, you may need medicine to help your heart work better. If there is no clear reason for your symptoms, your doctor may suggest watching and waiting for a while to see if the dizziness goes away on its own. Follow-up care is a key part of your treatment and safety. Be sure to make and go to all appointments, and call your doctor if you are having problems. It's also a good idea to know your test results and keep a list of the medicines you take. How can you care for yourself at home? · If your doctor recommends or prescribes medicine, take it exactly as directed. Call your doctor if you think you are having a problem with your medicine. · Do not drive while you feel dizzy. · Try to prevent falls. Steps you can take include:  ? Using nonskid mats, adding grab bars near the tub, and using night-lights. ? Clearing your home so that walkways are free of anything you might trip on.  ? Letting family and friends know that you have been feeling dizzy. This will help them know how to help you. When should you call for help? Call 911 anytime you think you may need emergency care. For example, call if:    · You passed out (lost consciousness).     · You have dizziness along with symptoms of a heart attack. These may include:  ? Chest pain or pressure, or a strange feeling in the chest.  ? Sweating. ? Shortness of breath. ? Nausea or vomiting. ? Pain, pressure, or a strange feeling in the back, neck, jaw, or upper belly or in one or both shoulders or arms. ? Lightheadedness or sudden weakness. ? A fast or irregular heartbeat.     · You have symptoms of a stroke. These may include:  ? Sudden numbness, tingling, weakness, or loss of movement in your face, arm, or leg, especially on only one side of your body. ? Sudden vision changes. ? Sudden trouble speaking.   ? Sudden confusion or trouble understanding simple statements. ? Sudden problems with walking or balance. ? A sudden, severe headache that is different from past headaches. Call your doctor now or seek immediate medical care if:    · You feel dizzy and have a fever, headache, or ringing in your ears.     · You have new or increased nausea and vomiting.     · Your dizziness does not go away or comes back. Watch closely for changes in your health, and be sure to contact your doctor if:    · You do not get better as expected. Where can you learn more? Go to http://www.gray.com/  Enter Q823 in the search box to learn more about \"Dizziness: Care Instructions. \"  Current as of: June 26, 2019               Content Version: 12.6  © 2231-1512 [employer]. Care instructions adapted under license by "[employer] (which disclaims liability or warranty for this information). If you have questions about a medical condition or this instruction, always ask your healthcare professional. [address] any warranty or liability for your use of this information.

## 2021-01-08 ENCOUNTER — CLINICAL SUPPORT (OUTPATIENT)
Dept: FAMILY MEDICINE CLINIC | Age: 31
End: 2021-01-08
Payer: MEDICAID

## 2021-01-08 ENCOUNTER — TELEPHONE (OUTPATIENT)
Dept: FAMILY MEDICINE CLINIC | Age: 31
End: 2021-01-08

## 2021-01-08 VITALS — TEMPERATURE: 98.1 F

## 2021-01-08 DIAGNOSIS — Z11.1 SCREENING-PULMONARY TB: Primary | ICD-10-CM

## 2021-01-08 PROCEDURE — 86580 TB INTRADERMAL TEST: CPT | Performed by: NURSE PRACTITIONER

## 2021-01-08 NOTE — PROGRESS NOTES
PPD Placement note  Huyen Guy, 27 y.o. male is here today for placement of PPD test  Reason for PPD test: foster care parent  Pt taken PPD test before:   Verified in allergy area and with patient that they are not allergic to the products PPD is made of (Phenol or Tween). Yes  Is patient taking any oral or IV steroid medication now or have they taken it in the last month? no  Has the patient ever received the BCG vaccine?: no  Has the patient been in recent contact with anyone known or suspected of having active TB disease?: no       Date of exposure (if applicable):        Name of person they were exposed to (if applicable):   Patient's Country of origin?: [place]  O: Alert and oriented in NAD. P:  PPD placed on 1/8/2021. Patient advised to return for reading within 48-72 hours.     Placed in patient's right forearm

## 2021-01-11 LAB
MM INDURATION POC: 0 MM (ref 0–5)
PPD POC: NEGATIVE NEGATIVE

## 2021-05-05 ENCOUNTER — HOSPITAL ENCOUNTER (EMERGENCY)
Age: 31
Discharge: HOME | End: 2021-05-05
Attending: EMERGENCY MEDICINE
Payer: MEDICAID

## 2021-05-05 VITALS
SYSTOLIC BLOOD PRESSURE: 164 MMHG | HEART RATE: 81 BPM | TEMPERATURE: 98.6 F | DIASTOLIC BLOOD PRESSURE: 110 MMHG | RESPIRATION RATE: 18 BRPM | OXYGEN SATURATION: 97 %

## 2021-05-05 DIAGNOSIS — H61.23 CERUMEN DEBRIS ON TYMPANIC MEMBRANE OF BOTH EARS: ICD-10-CM

## 2021-05-05 DIAGNOSIS — L02.91 ABSCESS: Primary | ICD-10-CM

## 2021-05-05 PROCEDURE — 75810000289 HC I&D ABSCESS SIMP/COMP/MULT

## 2021-05-05 PROCEDURE — 99281 EMR DPT VST MAYX REQ PHY/QHP: CPT

## 2021-05-05 RX ORDER — CLINDAMYCIN HYDROCHLORIDE 300 MG/1
300 CAPSULE ORAL 4 TIMES DAILY
Qty: 28 CAP | Refills: 0 | Status: SHIPPED | OUTPATIENT
Start: 2021-05-05 | End: 2021-05-12

## 2021-05-06 NOTE — ED PROVIDER NOTES
EMERGENCY DEPARTMENT HISTORY AND PHYSICAL EXAM    Date: 5/5/2021  Patient Name: Rubin Frank    History of Presenting Illness     Chief Complaint   Patient presents with    Skin Problem         History Provided By:patient     Chief Complaint: abscess  Duration: 3 days  Timing:  acute  Location: R inner thigh   Quality:throbbing   Severity: moderate  Modifying Factors: warm compress has not helped  Associated Symptoms: ear wax build up      Additional History (Context): Rubin Frank is a 27 y.o. male with PMH ADD, bipolar affective disorder, depression, fatty liver disease, hypertension, and recurrent ear infections who presents with complaints of an abscess to the right inner thigh for the past 3 days. Patient states he has tried squeezing the area and applying warm compress with little relief in his symptoms. Denies fever and chills. Patient also expresses concerns about earwax buildup to the right ear canal.  Denies any upper respiratory symptoms. No other complaints are reported at this time. PCP: Kacey Bailey NP    Current Outpatient Medications   Medication Sig Dispense Refill    clindamycin (CLEOCIN) 300 mg capsule Take 1 Cap by mouth four (4) times daily for 7 days. 28 Cap 0    carbamide peroxide (Debrox) 6.5 % otic solution Administer 5 Drops into each ear two (2) times a day. 7.5 mL 0    dextroamphetamine-amphetamine (ADDERALL) 30 mg tablet Take 1 Tab by mouth two (2) times a day. Max Daily Amount: 2 Tabs. 60 Tab 0    losartan (COZAAR) 50 mg tablet Take 1 Tab by mouth daily. 90 Tab 0    famotidine (PEPCID) 40 mg tablet       meloxicam (MOBIC) 15 mg tablet       calcium carbonate-vitamin D3 (CALTRATE 600 PLUS D) 600 mg (1,500 mg)-800 unit chew Take 1 Tab by mouth daily. 30 Tab 1    diclofenac (VOLTAREN) 1 % gel Apply 4 g to affected area four (4) times daily.  USE AS DIRECTED 100 g 1    LORazepam (ATIVAN) 0.5 mg tablet Take 1 Tab by mouth two (2) times daily as needed for Anxiety for up to 30 days. Max Daily Amount: 1 mg. Indications: anxious 60 Tab 1    albuterol (PROVENTIL HFA, VENTOLIN HFA, PROAIR HFA) 90 mcg/actuation inhaler Take 1-2 Puffs by inhalation every four (4) hours as needed for Wheezing. 1 Inhaler 0       Past History     Past Medical History:  Past Medical History:   Diagnosis Date    ADD (attention deficit disorder)     Back injury     Back problem     Bipolar affective disorder ([facility] [address]     Depression     Fatty liver     Fractures     3 slip disk;     History of ear infections     Hypertension     Positive urine drug screen 10/25/2018    +cocaine and thc     Suicidal ideation        Past Surgical History:  Past Surgical History:   Procedure Laterality Date    HX TYMPANOSTOMY         Family History:  Family History   Problem Relation Age of Onset    Cancer Father         mesothelioma    Heart Disease Maternal Grandmother     Diabetes Maternal Grandmother        Social History:  Social History     Tobacco Use    Smoking status: Current Every Day Smoker     Packs/day: 1.00     Years: 12.00     Pack years: 12.00    Smokeless tobacco: Current User   Substance Use Topics    Alcohol use: Yes     Alcohol/week: 6.7 standard drinks     Types: 8 Cans of beer per week     Frequency: Monthly or less     Drinks per session: 5 or 6     Binge frequency: Less than monthly     Comment: social    Drug use: Yes     Types: Marijuana       Allergies: Allergies   Allergen Reactions    Amoxicillin Swelling, Itching, Shortness of Breath and Unknown (comments)         Review of Systems   Review of Systems   Constitutional: Negative. Negative for chills and fever. HENT: Negative. Negative for congestion, ear pain and rhinorrhea. Eyes: Negative. Negative for pain and redness. Respiratory: Negative. Negative for cough, shortness of breath, wheezing and stridor. Cardiovascular: Negative. Negative for chest pain and leg swelling. Gastrointestinal: Negative. Negative for abdominal pain, constipation, diarrhea, nausea and vomiting. Genitourinary: Negative. Negative for dysuria and frequency. Musculoskeletal: Negative. Negative for back pain and neck pain. Skin: Positive for wound. Negative for rash. Neurological: Negative. Negative for dizziness, seizures, syncope and headaches. All other systems reviewed and are negative. All Other Systems Negative  Physical Exam     Vitals:    05/05/21 2237   BP: (!) 164/110   Pulse: 81   Resp: 18   Temp: 98.6 °F (37 °C)   SpO2: 97%     Physical Exam  Vitals signs and nursing note reviewed. Constitutional:       General: He is not in acute distress. Appearance: He is well-developed. He is obese. He is not diaphoretic. HENT:      Head: Normocephalic and atraumatic. Ears:      Comments: Increased cerumen debris to the canals bilaterally. Eyes:      General: No scleral icterus. Right eye: No discharge. Left eye: No discharge. Conjunctiva/sclera: Conjunctivae normal.   Neck:      Musculoskeletal: Normal range of motion and neck supple. Cardiovascular:      Rate and Rhythm: Normal rate. Pulmonary:      Effort: Pulmonary effort is normal. No respiratory distress. Breath sounds: No stridor. Musculoskeletal: Normal range of motion. Skin:     General: Skin is warm and dry. Comments: Small 2 cm abscess to the R inguinal region, slightly indurated and TTP. No active purulence elicited on palpation. Neurological:      Mental Status: He is alert and oriented to person, place, and time. Coordination: Coordination normal.      Comments: Gait is steady and patient exhibits no evidence of ataxia. Patient is able to ambulate without difficulty. No focal neurological deficit noted. No facial droop, slurred speech, or evidence of altered mentation noted on exam.     Psychiatric:         Behavior: Behavior normal.         Thought Content:  Thought content normal. Diagnostic Study Results     Labs -   No results found for this or any previous visit (from the past 12 hour(s)). Radiologic Studies -   No orders to display     CT Results  (Last 48 hours)    None        CXR Results  (Last 48 hours)    None            Medical Decision Making   I am the first provider for this patient. I reviewed the vital signs, available nursing notes, past medical history, past surgical history, family history and social history. Vital Signs-Reviewed the patient's vital signs. Records Reviewed: Trupti Rehman PA-C     Procedures:  I&D Abcess Simple    Date/Time: 5/5/2021 11:27 PM  Performed by: Hernan Maurice  Authorized by: Hernan Maurice     Consent:     Consent obtained:  Verbal    Consent given by:  Patient    Risks discussed:  Bleeding, incomplete drainage, infection and pain    Alternatives discussed:  No treatment and delayed treatment  Location:     Type:  Abscess    Size:  2 cm     Location: R inguinal region   Pre-procedure details:     Skin preparation:  Betadine  Anesthesia (see MAR for exact dosages): Anesthesia method:  Local infiltration    Local anesthetic:  Lidocaine 1% w/o epi (2 mL)  Procedure type:     Complexity:  Simple  Procedure details:     Needle aspiration: no      Incision types:  Single straight    Incision depth:  Subcutaneous    Scalpel blade:  11    Wound management:  Irrigated with saline    Drainage:  Bloody    Drainage amount: Moderate    Wound treatment:  Wound left open    Packing materials:  None  Post-procedure details:     Patient tolerance of procedure: Tolerated well, no immediate complications        Provider Notes (Medical Decision Making): Impression:  Abscess, increased cerumen debris    I and D performed, bloody drainage, will plan to treat with clindamycin. Debrox drops for cerumen build up. pcp follow-up. Pt agrees.  Trupti Rehman PA-C     MED RECONCILIATION:  No current facility-administered medications for this encounter. Current Outpatient Medications   Medication Sig    clindamycin (CLEOCIN) 300 mg capsule Take 1 Cap by mouth four (4) times daily for 7 days.  carbamide peroxide (Debrox) 6.5 % otic solution Administer 5 Drops into each ear two (2) times a day.  dextroamphetamine-amphetamine (ADDERALL) 30 mg tablet Take 1 Tab by mouth two (2) times a day. Max Daily Amount: 2 Tabs.  losartan (COZAAR) 50 mg tablet Take 1 Tab by mouth daily.  famotidine (PEPCID) 40 mg tablet     meloxicam (MOBIC) 15 mg tablet     calcium carbonate-vitamin D3 (CALTRATE 600 PLUS D) 600 mg (1,500 mg)-800 unit chew Take 1 Tab by mouth daily.  diclofenac (VOLTAREN) 1 % gel Apply 4 g to affected area four (4) times daily. USE AS DIRECTED    LORazepam (ATIVAN) 0.5 mg tablet Take 1 Tab by mouth two (2) times daily as needed for Anxiety for up to 30 days. Max Daily Amount: 1 mg. Indications: anxious    albuterol (PROVENTIL HFA, VENTOLIN HFA, PROAIR HFA) 90 mcg/actuation inhaler Take 1-2 Puffs by inhalation every four (4) hours as needed for Wheezing. Disposition:  D/c    DISCHARGE NOTE:   Patient is stable for discharge at this time. I have discussed all the findings from today's work up with the patient, including lab results and imaging. I have answered all questions. Rx for clindamycin and debrox drops given. Rest and close follow-up with the PCP recommended this week. Return to the ED immediately for any new or worsening symptoms.   April Claudia Hough PA-C     Follow-up Information     Follow up With Specialties Details Why Contact Info    Anika Stein NP Nurse Practitioner In 1 week  [address]  [phone]      [facility] EMERGENCY DEPT Emergency Medicine  As needed, If symptoms worsen [address] [place] [address]  [phone]          Current Discharge Medication List      START taking these medications    Details   clindamycin (CLEOCIN) 300 mg capsule Take 1 Cap by mouth four (4) times daily for 7 days. Qty: 28 Cap, Refills: 0      carbamide peroxide (Debrox) 6.5 % otic solution Administer 5 Drops into each ear two (2) times a day. Qty: 7.5 mL, Refills: 0               Diagnosis     Clinical Impression:   1. Abscess    2.  Cerumen debris on tympanic membrane of both ears

## 2021-05-06 NOTE — DISCHARGE INSTRUCTIONS
[employer] Activation    Thank you for requesting access to [employer]. Please follow the instructions below to securely access and download your online medical record. [employer] allows you to send messages to your doctor, view your test results, renew your prescriptions, schedule appointments, and more. How Do I Sign Up? In your internet browser, go to [email or web address]  Click on the First Time User? Click Here link in the Sign In box. You will be redirect to the New Member Sign Up page. Enter your [employer] Access Code exactly as it appears below. You will not need to use this code after youve completed the sign-up process. If you do not sign up before the expiration date, you must request a new code. [employer] Access Code: [unfilled] (This is the date your [employer] access code will )    Enter the last four digits of your Social Security Number (xxxx) and Date of Birth (mm/dd/yyyy) as indicated and click Submit. You will be taken to the next sign-up page. Create a [employer] ID. This will be your [employer] login ID and cannot be changed, so think of one that is secure and easy to remember. Create a [employer] password. You can change your password at any time. Enter your Password Reset Question and Answer. This can be used at a later time if you forget your password. Enter your e-mail address. You will receive e-mail notification when new information is available in [address] Click Sign Up. You can now view and download portions of your medical record. Click the [place] link to download a portable copy of your medical information. Additional Information    If you have questions, please visit the Frequently Asked Questions section of the [employer] website at [email or web address] Remember, [employer] is NOT to be used for urgent needs. For medical emergencies, dial 911.

## 2021-05-18 ENCOUNTER — APPOINTMENT (OUTPATIENT)
Dept: GENERAL RADIOLOGY | Age: 31
End: 2021-05-18
Attending: PHYSICIAN ASSISTANT
Payer: MEDICAID

## 2021-05-18 ENCOUNTER — HOSPITAL ENCOUNTER (EMERGENCY)
Age: 31
Discharge: HOME | End: 2021-05-18
Attending: EMERGENCY MEDICINE
Payer: MEDICAID

## 2021-05-18 VITALS
RESPIRATION RATE: 24 BRPM | BODY MASS INDEX: 38.43 KG/M2 | DIASTOLIC BLOOD PRESSURE: 93 MMHG | TEMPERATURE: 98.3 F | WEIGHT: 290 LBS | HEIGHT: 73 IN | SYSTOLIC BLOOD PRESSURE: 156 MMHG | HEART RATE: 80 BPM | OXYGEN SATURATION: 100 %

## 2021-05-18 DIAGNOSIS — R07.9 ACUTE CHEST PAIN: Primary | ICD-10-CM

## 2021-05-18 DIAGNOSIS — F14.10 COCAINE ABUSE (HCC): ICD-10-CM

## 2021-05-18 LAB
ALBUMIN SERPL-MCNC: 4.1 G/DL (ref 3.4–5)
ALBUMIN/GLOB SERPL: 1.1 {RATIO} (ref 0.8–1.7)
ALP SERPL-CCNC: 79 U/L (ref 45–117)
ALT SERPL-CCNC: 90 U/L (ref 16–61)
AMPHET UR QL SCN: NEGATIVE
ANION GAP SERPL CALC-SCNC: 7 MMOL/L (ref 3–18)
AST SERPL-CCNC: 44 U/L (ref 10–38)
BARBITURATES UR QL SCN: NEGATIVE
BASOPHILS # BLD: 0.1 K/UL (ref 0–0.1)
BASOPHILS NFR BLD: 1 % (ref 0–2)
BENZODIAZ UR QL: NEGATIVE
BILIRUB SERPL-MCNC: 0.3 MG/DL (ref 0.2–1)
BNP SERPL-MCNC: <5 PG/ML (ref 0–450)
BUN SERPL-MCNC: 7 MG/DL (ref 7–18)
BUN/CREAT SERPL: 8 (ref 12–20)
CALCIUM SERPL-MCNC: 8 MG/DL (ref 8.5–10.1)
CANNABINOIDS UR QL SCN: POSITIVE
CHLORIDE SERPL-SCNC: 104 MMOL/L (ref 100–111)
CK MB CFR SERPL CALC: NORMAL % (ref 0–4)
CK MB SERPL-MCNC: <1 NG/ML (ref 5–25)
CK SERPL-CCNC: 142 U/L (ref 39–308)
CO2 SERPL-SCNC: 25 MMOL/L (ref 21–32)
COCAINE UR QL SCN: POSITIVE
CREAT SERPL-MCNC: 0.91 MG/DL (ref 0.6–1.3)
DIFFERENTIAL METHOD BLD: ABNORMAL
EOSINOPHIL # BLD: 0 K/UL (ref 0–0.4)
EOSINOPHIL NFR BLD: 1 % (ref 0–5)
ERYTHROCYTE [DISTWIDTH] IN BLOOD BY AUTOMATED COUNT: 12.6 % (ref 11.6–14.5)
GLOBULIN SER CALC-MCNC: 3.8 G/DL (ref 2–4)
GLUCOSE SERPL-MCNC: 125 MG/DL (ref 74–99)
HCT VFR BLD AUTO: 46.4 % (ref 36–48)
HDSCOM,HDSCOM: ABNORMAL
HGB BLD-MCNC: 16.2 G/DL (ref 13–16)
LYMPHOCYTES # BLD: 2.4 K/UL (ref 0.9–3.6)
LYMPHOCYTES NFR BLD: 37 % (ref 21–52)
MAGNESIUM SERPL-MCNC: 2.2 MG/DL (ref 1.6–2.6)
MCH RBC QN AUTO: 31.6 PG (ref 24–34)
MCHC RBC AUTO-ENTMCNC: 34.9 G/DL (ref 31–37)
MCV RBC AUTO: 90.6 FL (ref 74–97)
METHADONE UR QL: NEGATIVE
MONOCYTES # BLD: 0.5 K/UL (ref 0.05–1.2)
MONOCYTES NFR BLD: 8 % (ref 3–10)
NEUTS SEG # BLD: 3.5 K/UL (ref 1.8–8)
NEUTS SEG NFR BLD: 53 % (ref 40–73)
OPIATES UR QL: NEGATIVE
PCP UR QL: NEGATIVE
PLATELET # BLD AUTO: 331 K/UL (ref 135–420)
PMV BLD AUTO: 8.5 FL (ref 9.2–11.8)
POTASSIUM SERPL-SCNC: 3.5 MMOL/L (ref 3.5–5.5)
PROT SERPL-MCNC: 7.9 G/DL (ref 6.4–8.2)
RBC # BLD AUTO: 5.12 M/UL (ref 4.35–5.65)
SODIUM SERPL-SCNC: 136 MMOL/L (ref 136–145)
TROPONIN I SERPL-MCNC: <0.02 NG/ML (ref 0–0.04)
TROPONIN I SERPL-MCNC: <0.02 NG/ML (ref 0–0.04)
WBC # BLD AUTO: 6.5 K/UL (ref 4.6–13.2)

## 2021-05-18 PROCEDURE — 74011250637 HC RX REV CODE- 250/637: Performed by: EMERGENCY MEDICINE

## 2021-05-18 PROCEDURE — 71045 X-RAY EXAM CHEST 1 VIEW: CPT

## 2021-05-18 PROCEDURE — 85025 COMPLETE CBC W/AUTO DIFF WBC: CPT

## 2021-05-18 PROCEDURE — 80053 COMPREHEN METABOLIC PANEL: CPT

## 2021-05-18 PROCEDURE — 82553 CREATINE MB FRACTION: CPT

## 2021-05-18 PROCEDURE — 99285 EMERGENCY DEPT VISIT HI MDM: CPT

## 2021-05-18 PROCEDURE — 74011250636 HC RX REV CODE- 250/636: Performed by: EMERGENCY MEDICINE

## 2021-05-18 PROCEDURE — 83880 ASSAY OF NATRIURETIC PEPTIDE: CPT

## 2021-05-18 PROCEDURE — 80307 DRUG TEST PRSMV CHEM ANLYZR: CPT

## 2021-05-18 PROCEDURE — 96374 THER/PROPH/DIAG INJ IV PUSH: CPT

## 2021-05-18 PROCEDURE — 93005 ELECTROCARDIOGRAM TRACING: CPT

## 2021-05-18 PROCEDURE — 83735 ASSAY OF MAGNESIUM: CPT

## 2021-05-18 RX ORDER — GUAIFENESIN 100 MG/5ML
324 LIQUID (ML) ORAL
Status: COMPLETED | OUTPATIENT
Start: 2021-05-18 | End: 2021-05-18

## 2021-05-18 RX ORDER — LORAZEPAM 0.5 MG/1
0.5 TABLET ORAL
Status: DISCONTINUED | OUTPATIENT
Start: 2021-05-18 | End: 2021-05-18 | Stop reason: HOSPADM

## 2021-05-18 RX ORDER — LORAZEPAM 2 MG/ML
1 INJECTION INTRAMUSCULAR
Status: COMPLETED | OUTPATIENT
Start: 2021-05-18 | End: 2021-05-18

## 2021-05-18 RX ADMIN — LORAZEPAM 1 MG: 2 INJECTION INTRAMUSCULAR; INTRAVENOUS at 14:18

## 2021-05-18 RX ADMIN — SODIUM CHLORIDE 1000 ML: 900 INJECTION, SOLUTION INTRAVENOUS at 14:18

## 2021-05-18 RX ADMIN — ASPIRIN 324 MG: 81 TABLET, CHEWABLE ORAL at 14:19

## 2021-05-18 RX ADMIN — SODIUM CHLORIDE 1000 ML: 900 INJECTION, SOLUTION INTRAVENOUS at 18:16

## 2021-05-18 NOTE — ED NOTES
Patient is aware that a urine sample is needed states he urinated before arriving at the ED, given a urinal

## 2021-05-18 NOTE — ED PROVIDER NOTES
EMERGENCY DEPARTMENT HISTORY AND PHYSICAL EXAM    Date: 5/18/2021  Patient Name: Teresa King    History of Presenting Illness     Chief Complaint   Patient presents with    Chest Pain         History Provided By: Patient      Additional History (Context): Teresa King is a 27 y.o. male with hypertension, obesity and ADHD, BPD who presents with pressure retrosternally. He said it began not too long after he finished using crack cocaine. He says he has been partying pretty intensely for the past week. Denies intentional overdose. He is complaining of numbness tingling in bilateral hands and feet. Pain does not radiate. Does not take daily ASA; denies recent stress testing, echocardiogram, FAM h/o early MI. Not followed by cardiology. Smokes tobacco and uses weed as well. Drinks ETOH, more this past week. Pain began approx 90min pta. PCP: Luis Mead NP    Current Facility-Administered Medications   Medication Dose Route Frequency Provider Last Rate Last Admin    sodium chloride 0.9 % bolus infusion 1,000 mL  1,000 mL IntraVENous ONCE Emelina Mccartney PA         Current Outpatient Medications   Medication Sig Dispense Refill    dextroamphetamine-amphetamine (ADDERALL) 30 mg tablet Take 1 Tab by mouth two (2) times a day. Max Daily Amount: 2 Tabs. 60 Tab 0    carbamide peroxide (Debrox) 6.5 % otic solution Administer 5 Drops into each ear two (2) times a day. 7.5 mL 0    losartan (COZAAR) 50 mg tablet Take 1 Tab by mouth daily. 90 Tab 0    famotidine (PEPCID) 40 mg tablet       meloxicam (MOBIC) 15 mg tablet       calcium carbonate-vitamin D3 (CALTRATE 600 PLUS D) 600 mg (1,500 mg)-800 unit chew Take 1 Tab by mouth daily. 30 Tab 1    diclofenac (VOLTAREN) 1 % gel Apply 4 g to affected area four (4) times daily. USE AS DIRECTED 100 g 1    LORazepam (ATIVAN) 0.5 mg tablet Take 1 Tab by mouth two (2) times daily as needed for Anxiety for up to 30 days. Max Daily Amount: 1 mg. Indications: anxious 60 Tab 1    albuterol (PROVENTIL HFA, VENTOLIN HFA, PROAIR HFA) 90 mcg/actuation inhaler Take 1-2 Puffs by inhalation every four (4) hours as needed for Wheezing. 1 Inhaler 0       Past History     Past Medical History:  Past Medical History:   Diagnosis Date    ADD (attention deficit disorder)     Back injury     Back problem     Bipolar affective disorder ([facility] [address]     Depression     Fatty liver     Fractures     3 slip disk;     History of ear infections     Hypertension     Positive urine drug screen 10/25/2018    +cocaine and thc     Suicidal ideation        Past Surgical History:  Past Surgical History:   Procedure Laterality Date    HX TYMPANOSTOMY         Family History:  Family History   Problem Relation Age of Onset    Cancer Father         mesothelioma    Heart Disease Maternal Grandmother     Diabetes Maternal Grandmother        Social History:  Social History     Tobacco Use    Smoking status: Current Every Day Smoker     Packs/day: 1.00     Years: 12.00     Pack years: 12.00    Smokeless tobacco: Current User   Substance Use Topics    Alcohol use: Yes     Alcohol/week: 6.7 standard drinks     Types: 8 Cans of beer per week     Frequency: Monthly or less     Drinks per session: 5 or 6     Binge frequency: Less than monthly     Comment: social    Drug use: Yes     Types: Marijuana, Cocaine     Comment: states he has been using cocaine for the past week, 5/18/21       Allergies: Allergies   Allergen Reactions    Amoxicillin Swelling, Itching, Shortness of Breath and Unknown (comments)         Review of Systems   Review of Systems   Constitutional: Negative. HENT: Negative. Eyes: Negative. Respiratory: Negative. Negative for shortness of breath. Cardiovascular: Positive for chest pain. Gastrointestinal: Negative for diarrhea, nausea and vomiting. Endocrine: Negative. Genitourinary: Negative. Musculoskeletal: Negative. Skin: Negative. Allergic/Immunologic: Negative. Neurological: Positive for light-headedness and numbness. Negative for weakness. Hematological: Negative. Psychiatric/Behavioral: Negative. All Other Systems Negative  Physical Exam     Vitals:    05/18/21 1430 05/18/21 1445 05/18/21 1500 05/18/21 1515   BP: (!) 160/147 (!) 146/89 (!) 138/93 (!) 140/85   Pulse: 88 82 84 84   Resp:  17 20 18   Temp:       SpO2: 98% 97% 97% 97%   Weight:       Height:         Physical Exam  Vitals signs and nursing note reviewed. Constitutional:       General: He is in acute distress. Appearance: He is well-developed. He is obese. He is not ill-appearing, toxic-appearing or diaphoretic. HENT:      Head: Normocephalic and atraumatic. Neck:      Musculoskeletal: Normal range of motion and neck supple. Thyroid: No thyromegaly. Vascular: No carotid bruit. Trachea: No tracheal deviation. Cardiovascular:      Rate and Rhythm: Normal rate and regular rhythm. Heart sounds: Normal heart sounds. No murmur. No friction rub. No gallop. Comments: equal radial pulses  Pulmonary:      Effort: Pulmonary effort is normal. No respiratory distress. Breath sounds: Normal breath sounds. No stridor. No wheezing or rales. Chest:      Chest wall: No tenderness. Abdominal:      General: There is no distension. Palpations: Abdomen is soft. There is no mass. Tenderness: There is no abdominal tenderness. There is no guarding or rebound. Comments: No pulsatile mass palpated. Musculoskeletal: Normal range of motion. Skin:     General: Skin is warm and dry. Coloration: Skin is not pale. Neurological:      Mental Status: He is alert. Psychiatric:         Speech: Speech normal.         Behavior: Behavior normal.         Thought Content:  Thought content normal.         Judgment: Judgment normal.                Diagnostic Study Results     Labs -     Recent Results (from the past 12 hour(s))   EKG, 12 LEAD, INITIAL    Collection Time: 05/18/21  1:41 PM   Result Value Ref Range    Ventricular Rate 85 BPM    Atrial Rate 85 BPM    P-R Interval 150 ms    QRS Duration 90 ms    Q-T Interval 392 ms    QTC Calculation (Bezet) 466 ms    Calculated P Axis 26 degrees    Calculated R Axis 29 degrees    Calculated T Axis 50 degrees    Diagnosis       Normal sinus rhythm  Normal ECG  When compared with ECG of 21-OCT-2020 04:17,  No significant change was found     CBC WITH AUTOMATED DIFF    Collection Time: 05/18/21  1:47 PM   Result Value Ref Range    WBC 6.5 4.6 - 13.2 K/uL    RBC 5.12 4.35 - 5.65 M/uL    HGB 16.2 (H) 13.0 - 16.0 g/dL    HCT 46.4 36.0 - 48.0 %    MCV 90.6 74.0 - 97.0 FL    MCH 31.6 24.0 - 34.0 PG    MCHC 34.9 31.0 - 37.0 g/dL    RDW 12.6 11.6 - 14.5 %    PLATELET 157 414 - 367 K/uL    MPV 8.5 (L) 9.2 - 11.8 FL    NEUTROPHILS 53 40 - 73 %    LYMPHOCYTES 37 21 - 52 %    MONOCYTES 8 3 - 10 %    EOSINOPHILS 1 0 - 5 %    BASOPHILS 1 0 - 2 %    ABS. NEUTROPHILS 3.5 1.8 - 8.0 K/UL    ABS. LYMPHOCYTES 2.4 0.9 - 3.6 K/UL    ABS. MONOCYTES 0.5 0.05 - 1.2 K/UL    ABS. EOSINOPHILS 0.0 0.0 - 0.4 K/UL    ABS. BASOPHILS 0.1 0.0 - 0.1 K/UL    DF AUTOMATED     METABOLIC PANEL, COMPREHENSIVE    Collection Time: 05/18/21  1:47 PM   Result Value Ref Range    Sodium 136 136 - 145 mmol/L    Potassium 3.5 3.5 - 5.5 mmol/L    Chloride 104 100 - 111 mmol/L    CO2 25 21 - 32 mmol/L    Anion gap 7 3.0 - 18 mmol/L    Glucose 125 (H) 74 - 99 mg/dL    BUN 7 7.0 - 18 MG/DL    Creatinine 0.91 0.6 - 1.3 MG/DL    BUN/Creatinine ratio 8 (L) 12 - 20      GFR est AA >60 >60 ml/min/1.73m2    GFR est non-AA >60 >60 ml/min/1.73m2    Calcium 8.0 (L) 8.5 - 10.1 MG/DL    Bilirubin, total 0.3 0.2 - 1.0 MG/DL    ALT (SGPT) 90 (H) 16 - 61 U/L    AST (SGOT) 44 (H) 10 - 38 U/L    Alk.  phosphatase 79 45 - 117 U/L    Protein, total 7.9 6.4 - 8.2 g/dL    Albumin 4.1 3.4 - 5.0 g/dL    Globulin 3.8 2.0 - 4.0 g/dL    A-G Ratio 1.1 0.8 - 1.7     CARDIAC PANEL,(CK, CKMB & TROPONIN)    Collection Time: 05/18/21  1:47 PM   Result Value Ref Range    CK - MB <1.0 <3.6 ng/ml    CK-MB Index  0.0 - 4.0 %     CALCULATION NOT PERFORMED WHEN RESULT IS BELOW LINEAR LIMIT     39 - 308 U/L    Troponin-I, QT <0.02 0.0 - 0.045 NG/ML   MAGNESIUM    Collection Time: 05/18/21  1:47 PM   Result Value Ref Range    Magnesium 2.2 1.6 - 2.6 mg/dL   NT-PRO BNP    Collection Time: 05/18/21  1:47 PM   Result Value Ref Range    NT pro-BNP <5 0 - 450 PG/ML       Radiologic Studies -   XR CHEST PORT   Final Result   Normal chest.        CT Results  (Last 48 hours)    None        CXR Results  (Last 48 hours)               05/18/21 1426  XR CHEST PORT Final result    Impression:  Normal chest.       Narrative:  EXAM: XR CHEST PORT       INDICATION: chest pain       COMPARISON: 9/22/2018       FINDINGS: A portable AP radiograph of the chest was obtained at 1409 hours. The   patient is on a cardiac monitor. The lungs are clear. The cardiac and   mediastinal contours and pulmonary vascularity are normal.  The bones and soft   tissues are grossly within normal limits. Medical Decision Making   I am the first provider for this patient. I reviewed the vital signs, available nursing notes, past medical history, past surgical history, family history and social history. Vital Signs-Reviewed the patient's vital signs.     Records Reviewed: Nursing Notes    Procedures:  EKG    Date/Time: 5/18/2021 1:50 PM  Performed by: WARREN Starr  Authorized by: WARREN Starr     ECG reviewed by ED Physician in the absence of a cardiologist: yes    Interpretation:     Interpretation: normal    Rate:     ECG rate:  85    ECG rate assessment: normal    Rhythm:     Rhythm: sinus rhythm    Ectopy:     Ectopy: none    QRS:     QRS axis:  Normal    QRS intervals:  Normal  Conduction:     Conduction: normal    ST segments:     ST segments:  Normal  T waves:     T waves: normal Provider Notes (Medical Decision Making): no acute EKG changes. Pain has improved significantly w/IV ativan and pt continues to sleep. Pain did not radiate to his back. Likely his source of bilateral hand and feet tingling b/c of hyperventilating, not a dissection. Had also ED attending examen patient at arrival.  Will repeat troponin at 6;, 6hrs past pain onset. 5:07 PM : Pt care transferred to [facility] provider. History of patient complaint(s), available diagnostic reports and current treatment plan has been discussed thoroughly. Bedside rounding on patient occured : yes . Intended disposition of patient : home  Pending diagnostics reports and/or labs (please list): repeat troponin at 6p and UDS    MED RECONCILIATION:  Current Facility-Administered Medications   Medication Dose Route Frequency    sodium chloride 0.9 % bolus infusion 1,000 mL  1,000 mL IntraVENous ONCE     Current Outpatient Medications   Medication Sig    dextroamphetamine-amphetamine (ADDERALL) 30 mg tablet Take 1 Tab by mouth two (2) times a day. Max Daily Amount: 2 Tabs.  carbamide peroxide (Debrox) 6.5 % otic solution Administer 5 Drops into each ear two (2) times a day.  losartan (COZAAR) 50 mg tablet Take 1 Tab by mouth daily.  famotidine (PEPCID) 40 mg tablet     meloxicam (MOBIC) 15 mg tablet     calcium carbonate-vitamin D3 (CALTRATE 600 PLUS D) 600 mg (1,500 mg)-800 unit chew Take 1 Tab by mouth daily.  diclofenac (VOLTAREN) 1 % gel Apply 4 g to affected area four (4) times daily. USE AS DIRECTED    LORazepam (ATIVAN) 0.5 mg tablet Take 1 Tab by mouth two (2) times daily as needed for Anxiety for up to 30 days. Max Daily Amount: 1 mg. Indications: anxious    albuterol (PROVENTIL HFA, VENTOLIN HFA, PROAIR HFA) 90 mcg/actuation inhaler Take 1-2 Puffs by inhalation every four (4) hours as needed for Wheezing.        Disposition:  TBD    Follow-up Information    None         Current Discharge Medication List            Diagnosis     Clinical Impression:   1. Acute chest pain    2.  [facility] ([facility]

## 2021-05-18 NOTE — ED NOTES
I performed a brief evaluation, including history and physical, of the patient here in triage and I have determined that pt will need further treatment and evaluation from the main side ER physician. I have placed initial orders to help in expediting patients care. May 18, 2021 at 1:44 PM - WRAREN Harvey      Lightheadeness with b/l arm and leg tingling with assoc aching b/l chest pain x 1 hour. Pt reports snorting cocaine last night but he believes it may have been cut with something. Denies cardiac hx.

## 2021-05-18 NOTE — ED NOTES
6:00 PM Assumed care of the pt at this time. Discussed with WARREN Mccartney concerning patient Milton Belcher, standard discussion of reason for visit, HPI, ROS, PE, and current results available. Recommendation for obtaining pending repeat troponin then bedside re-evaluation to dispo the pt. Trupti Rehman PA-C     7:45 PM patient's repeat troponin is negative. I have seen and reevaluated the patient at bedside. Patient states he felt better with his first dose of Ativan but is now starting to feel somewhat anxious again. I have discussed all of the results of today's visit with the patient at bedside. We will plan to give the patient 1 dose of oral Ativan and discharged with the appropriate outpatient follow-up. Return precautions given. Patient agrees with this plan.  Trupti Rehman PA-C     Disposition: d/c home

## 2021-05-18 NOTE — ED TRIAGE NOTES
Patient presents to ED with c/o chest pain that started about 1 hour prior to ED arrival after heavy cocaine use last night. Patient shares that he feels anxious and as if he's going to pass out.  PMh of HTN

## 2021-05-18 NOTE — DISCHARGE INSTRUCTIONS
Social[employer] Activation    Thank you for requesting access to [employer]. Please follow the instructions below to securely access and download your online medical record. Social[employer] allows you to send messages to your doctor, view your test results, renew your prescriptions, schedule appointments, and more. How Do I Sign Up? In your internet browser, go to [email or web address]  Click on the First Time User? Click Here link in the Sign In box. You will be redirect to the New Member Sign Up page. Enter your Socialscope Access Code exactly as it appears below. You will not need to use this code after youve completed the sign-up process. If you do not sign up before the expiration date, you must request a new code. Social[employer] Access Code: [unfilled] (This is the date your Socialscope access code will )    Enter the last four digits of your Social Security Number (xxxx) and Date of Birth (mm/dd/yyyy) as indicated and click Submit. You will be taken to the next sign-up page. Create a Socialscope ID. This will be your Socialscope login ID and cannot be changed, so think of one that is secure and easy to remember. Create a Socialscope password. You can change your password at any time. Enter your Password Reset Question and Answer. This can be used at a later time if you forget your password. Enter your e-mail address. You will receive e-mail notification when new information is available in [address] Click Sign Up. You can now view and download portions of your medical record. Click the [place] link to download a portable copy of your medical information. Additional Information    If you have questions, please visit the Frequently Asked Questions section of the Socialscope website at [email or web address] Remember, Socialscope is NOT to be used for urgent needs. For medical emergencies, dial 911.

## 2021-05-19 LAB
ATRIAL RATE: 85 BPM
CALCULATED P AXIS, ECG09: 26 DEGREES
CALCULATED R AXIS, ECG10: 29 DEGREES
CALCULATED T AXIS, ECG11: 50 DEGREES
DIAGNOSIS, 93000: NORMAL
P-R INTERVAL, ECG05: 150 MS
Q-T INTERVAL, ECG07: 392 MS
QRS DURATION, ECG06: 90 MS
QTC CALCULATION (BEZET), ECG08: 466 MS
VENTRICULAR RATE, ECG03: 85 BPM

## 2021-11-15 ENCOUNTER — HOSPITAL ENCOUNTER (EMERGENCY)
Age: 31
Discharge: HOME | End: 2021-11-15
Attending: STUDENT IN AN ORGANIZED HEALTH CARE EDUCATION/TRAINING PROGRAM | Admitting: STUDENT IN AN ORGANIZED HEALTH CARE EDUCATION/TRAINING PROGRAM
Payer: MEDICAID

## 2021-11-15 VITALS
WEIGHT: 296 LBS | HEIGHT: 73 IN | BODY MASS INDEX: 39.23 KG/M2 | OXYGEN SATURATION: 97 % | DIASTOLIC BLOOD PRESSURE: 99 MMHG | HEART RATE: 76 BPM | SYSTOLIC BLOOD PRESSURE: 150 MMHG | TEMPERATURE: 98 F | RESPIRATION RATE: 18 BRPM

## 2021-11-15 DIAGNOSIS — R22.0 FACIAL SWELLING: Primary | ICD-10-CM

## 2021-11-15 PROCEDURE — 99282 EMERGENCY DEPT VISIT SF MDM: CPT

## 2021-11-15 PROCEDURE — 74011250637 HC RX REV CODE- 250/637: Performed by: STUDENT IN AN ORGANIZED HEALTH CARE EDUCATION/TRAINING PROGRAM

## 2021-11-15 RX ORDER — CLINDAMYCIN HYDROCHLORIDE 150 MG/1
450 CAPSULE ORAL
Status: COMPLETED | OUTPATIENT
Start: 2021-11-15 | End: 2021-11-15

## 2021-11-15 RX ORDER — IBUPROFEN 600 MG/1
600 TABLET ORAL
Status: COMPLETED | OUTPATIENT
Start: 2021-11-15 | End: 2021-11-15

## 2021-11-15 RX ORDER — CLINDAMYCIN HYDROCHLORIDE 150 MG/1
450 CAPSULE ORAL 3 TIMES DAILY
Qty: 63 CAPSULE | Refills: 0 | Status: SHIPPED | OUTPATIENT
Start: 2021-11-15

## 2021-11-15 RX ORDER — CLINDAMYCIN HYDROCHLORIDE 150 MG/1
300 CAPSULE ORAL
Status: DISCONTINUED | OUTPATIENT
Start: 2021-11-15 | End: 2021-11-15

## 2021-11-15 RX ADMIN — IBUPROFEN 600 MG: 600 TABLET ORAL at 21:26

## 2021-11-15 RX ADMIN — CLINDAMYCIN HYDROCHLORIDE 450 MG: 150 CAPSULE ORAL at 21:26

## 2021-11-16 ENCOUNTER — APPOINTMENT (OUTPATIENT)
Dept: CT IMAGING | Age: 31
End: 2021-11-16
Attending: EMERGENCY MEDICINE
Payer: MEDICAID

## 2021-11-16 ENCOUNTER — HOSPITAL ENCOUNTER (EMERGENCY)
Age: 31
Discharge: HOME | End: 2021-11-16
Attending: EMERGENCY MEDICINE
Payer: MEDICAID

## 2021-11-16 VITALS
SYSTOLIC BLOOD PRESSURE: 184 MMHG | HEART RATE: 85 BPM | RESPIRATION RATE: 16 BRPM | DIASTOLIC BLOOD PRESSURE: 107 MMHG | HEIGHT: 73 IN | OXYGEN SATURATION: 96 % | TEMPERATURE: 98.6 F | BODY MASS INDEX: 39.23 KG/M2 | WEIGHT: 296 LBS

## 2021-11-16 DIAGNOSIS — K11.5 SIALOLITHIASIS OF SUBMANDIBULAR GLAND: Primary | ICD-10-CM

## 2021-11-16 LAB
ANION GAP SERPL CALC-SCNC: 6 MMOL/L (ref 3–18)
BASOPHILS # BLD: 0.1 K/UL (ref 0–0.1)
BASOPHILS NFR BLD: 1 % (ref 0–2)
BUN SERPL-MCNC: 12 MG/DL (ref 7–18)
BUN/CREAT SERPL: 13 (ref 12–20)
CALCIUM SERPL-MCNC: 9.6 MG/DL (ref 8.5–10.1)
CHLORIDE SERPL-SCNC: 104 MMOL/L (ref 100–111)
CO2 SERPL-SCNC: 29 MMOL/L (ref 21–32)
CREAT SERPL-MCNC: 0.92 MG/DL (ref 0.6–1.3)
DIFFERENTIAL METHOD BLD: ABNORMAL
EOSINOPHIL # BLD: 0.2 K/UL (ref 0–0.4)
EOSINOPHIL NFR BLD: 2 % (ref 0–5)
ERYTHROCYTE [DISTWIDTH] IN BLOOD BY AUTOMATED COUNT: 11.9 % (ref 11.6–14.5)
GLUCOSE SERPL-MCNC: 124 MG/DL (ref 74–99)
HCT VFR BLD AUTO: 45.4 % (ref 36–48)
HGB BLD-MCNC: 15.3 G/DL (ref 13–16)
IMM GRANULOCYTES # BLD AUTO: 0.1 K/UL (ref 0–0.04)
IMM GRANULOCYTES NFR BLD AUTO: 1 % (ref 0–0.5)
LYMPHOCYTES # BLD: 2.3 K/UL (ref 0.9–3.6)
LYMPHOCYTES NFR BLD: 26 % (ref 21–52)
MCH RBC QN AUTO: 30.8 PG (ref 24–34)
MCHC RBC AUTO-ENTMCNC: 33.7 G/DL (ref 31–37)
MCV RBC AUTO: 91.5 FL (ref 78–100)
MONOCYTES # BLD: 0.8 K/UL (ref 0.05–1.2)
MONOCYTES NFR BLD: 9 % (ref 3–10)
NEUTS SEG # BLD: 5.5 K/UL (ref 1.8–8)
NEUTS SEG NFR BLD: 62 % (ref 40–73)
NRBC # BLD: 0 K/UL (ref 0–0.01)
NRBC BLD-RTO: 0 PER 100 WBC
PLATELET # BLD AUTO: 307 K/UL (ref 135–420)
PMV BLD AUTO: 9 FL (ref 9.2–11.8)
POTASSIUM SERPL-SCNC: 3.7 MMOL/L (ref 3.5–5.5)
RBC # BLD AUTO: 4.96 M/UL (ref 4.35–5.65)
SODIUM SERPL-SCNC: 139 MMOL/L (ref 136–145)
WBC # BLD AUTO: 8.9 K/UL (ref 4.6–13.2)

## 2021-11-16 PROCEDURE — 70491 CT SOFT TISSUE NECK W/DYE: CPT

## 2021-11-16 PROCEDURE — 99281 EMR DPT VST MAYX REQ PHY/QHP: CPT

## 2021-11-16 PROCEDURE — 74011000636 HC RX REV CODE- 636: Performed by: EMERGENCY MEDICINE

## 2021-11-16 PROCEDURE — 85025 COMPLETE CBC W/AUTO DIFF WBC: CPT

## 2021-11-16 PROCEDURE — 80048 BASIC METABOLIC PNL TOTAL CA: CPT

## 2021-11-16 RX ORDER — IBUPROFEN 800 MG/1
800 TABLET ORAL EVERY 8 HOURS
Qty: 15 TABLET | Refills: 0 | Status: SHIPPED | OUTPATIENT
Start: 2021-11-16 | End: 2021-11-21

## 2021-11-16 RX ADMIN — IOPAMIDOL 80 ML: 612 INJECTION, SOLUTION INTRAVENOUS at 20:49

## 2021-11-16 NOTE — DISCHARGE INSTRUCTIONS
Follow-up with one of the provided dental clinics below:    [facility] [id number] [phone]  [place] [address] [address] [phone]    Call to schedule an appointment.

## 2021-11-16 NOTE — ED TRIAGE NOTES
Pt with complaint of increasing L sided jaw swelling x 5 hours. Pt states he does have cavities but is not having any dental pain. Denies any fevers.

## 2021-11-16 NOTE — ED TRIAGE NOTES
Pt comes in complaining of mouth swelling and pain in his mouth which radiates down his neck. Pt states that he was here yesterday and given antibiotics but the swelling has gotten worse. Swelling noted left lower mouth area.

## 2021-11-16 NOTE — ED PROVIDER NOTES
HPI   Patient is a 55-year-old male who presents with a 5-hour history left lower jaw facial swelling. He states he has never had anything like this before. He states it is mildly painful. He denies any trauma or injury. He states over the last couple days he has noticed that his left lower posterior molar has been coming and is been causing him some discomfort but he denies any cavities, cracks or obvious infections. He denies any fever, sweats or chills. Denies any trouble holding onto his prescription reactions, trouble swallowing or trouble breathing. Is not had to take anything for this. He is not sure makes it better or worse. Past Medical History:   Diagnosis Date    ADD (attention deficit disorder)     Back injury     Back problem     Bipolar affective disorder ([facility] [address]     Depression     Fatty liver     Fractures     3 slip disk;     History of ear infections     Hypertension     Positive urine drug screen 10/25/2018    +cocaine and thc     Suicidal ideation        Past Surgical History:   Procedure Laterality Date    HX TYMPANOSTOMY           Family History:   Problem Relation Age of Onset    Cancer Father         mesothelioma    Heart Disease Maternal Grandmother     Diabetes Maternal Grandmother        Social History     Socioeconomic History    Marital status: SINGLE     Spouse name: Not on file    Number of children: 3    Years of education: 9    Highest education level: Not on file   Occupational History    Occupation: Unemployed   Tobacco Use    Smoking status: Current Every Day Smoker     Packs/day: 1.00     Years: 12.00     Pack years: 12.00    Smokeless tobacco: Current User   Substance and Sexual Activity    Alcohol use:  Yes     Alcohol/week: 6.7 standard drinks     Types: 8 Cans of beer per week     Comment: social    Drug use: Yes     Types: Marijuana, Cocaine     Comment: states he has been using cocaine for the past week, 5/18/21    Sexual activity: Yes Partners: Female     Birth control/protection: Condom   Other Topics Concern     Service No    Blood Transfusions No    Caffeine Concern No    Occupational Exposure Yes    Hobby Hazards Yes     Comment: motorcycle    Sleep Concern Yes    Stress Concern Yes    Weight Concern Yes    Special Diet No    Back Care Yes     Comment: \"I have 2 slipped disc and herniated disc\"    Exercise Yes    Bike Helmet Yes    Seat Belt No    Self-Exams Yes   Social History Narrative    Patient lives with grandmother  mother, and little brother. Social Determinants of Health     Financial Resource Strain:     Difficulty of Paying Living Expenses: Not on file   Food Insecurity:     Worried About Running Out of Food in the Last Year: Not on file    Ramya of Food in the Last Year: Not on file   Transportation Needs:     Lack of Transportation (Medical): Not on file    Lack of Transportation (Non-Medical):  Not on file   Physical Activity:     Days of Exercise per Week: Not on file    Minutes of Exercise per Session: Not on file   Stress:     Feeling of Stress : Not on file   Social Connections:     Frequency of Communication with Friends and Family: Not on file    Frequency of Social Gatherings with Friends and Family: Not on file    Attends Orthodox Services: Not on file    Active Member of [address] [place] [address] Generous Deals or Organizations: Not on file    Attends Club or Organization Meetings: Not on file    Marital Status: Not on file   Intimate Partner Violence:     Fear of Current or Ex-Partner: Not on file    Emotionally Abused: Not on file    Physically Abused: Not on file    Sexually Abused: Not on file   Housing Stability:     Unable to Pay for Housing in the Last Year: Not on file    Number of [place] in the Last Year: Not on file    Unstable Housing in the Last Year: Not on file         ALLERGIES: Amoxicillin    Review of Systems  Constitutional: No fever  HENT: No ear pain  Eyes: No change in vision  Respiratory: No SOB  Cardio: No chest pain  GI: No blood in stool  : No hematuria  MSK: No back pain  Skin: No rashes  Neuro: No headache    Vitals:    11/15/21 2020   BP: (!) 150/99   Pulse: 76   Resp: 18   Temp: 98 °F (36.7 °C)   SpO2: 97%   Weight: 134.3 kg (296 lb)   Height: 6' 1\" (1.854 m)            Physical Exam   General: No acute distress  Head: Normocephalic, atraumatic  Psych: Cooperative and alert  Eyes: No scleral icterus, normal conjunctiva  ENT: Moist mucosa, no swelling noted to posterior pharynx, soft palate or under the tongue. No intra-abdominal abscesses or fluctuant pockets noted. Tenderness noted to left lower last molar without any obvious cavity or lesion however overall poor dentition  Neck: Supple, no significant lymphadenopathy, swelling  CV: Regular rate and rhythm, no pitting edema, palpable radial pulses  Pulm: Clear breath sounds bilaterally without any wheezing or rhonchi, normal respiratory rate  GI: Normal bowel sounds, soft, non-tender  MSK: Moves all four extremities  Skin: No rashes  Neuro: Alert and conversive    [facility]   Patient is 27-year-old male who presents with left-sided facial swelling. My suspicion is if this is an infection caused by a tooth infection to his left lower molar. Patient does not have any concerning findings of airway compromise, Charly's. He does not have any changes to medication or does not take any medications besides Adderall that could be a drug reaction. We did discuss concerning findings of airway obstruction or throat obstruction. Patient be treated with dose of clindamycin and ibuprofen here in the emergency department. Patient stable for discharge at this time. Patient is in agreement with the plan to be discharged at this time. All the patient's questions were answered. Patient was given written instructions on the diagnosis, and states understanding of the plan moving forward.   We did discuss important signs and symptoms that should prompt quick return to the emergency department. Disposition: Patient was discharged home in stable condition.   They will follow up with their primary care physician    Prescriptions: Clindamycin    Diagnosis: Acute facial swelling, likely from dental abscess    Procedures

## 2021-11-17 NOTE — ED PROVIDER NOTES
EMERGENCY DEPARTMENT HISTORY AND PHYSICAL EXAM    Date: 11/16/2021  Patient Name: Jovanna Ortiz    History of Presenting Illness     Chief Complaint   Patient presents with    Mouth Pain         History Provided By: Patient    Additional History (Context): Jovanna Ortiz is a 32 y.o. male with hypertension and ADHD, polysubstance abuse, SI who presents with c/o left sided neck swelling. Denies dental pain. Says neck swelling worse despite Rx for clindamycin from last night. PCP: None    Current Outpatient Medications   Medication Sig Dispense Refill    ibuprofen (MOTRIN) 800 mg tablet Take 1 Tablet by mouth every eight (8) hours for 5 days. 15 Tablet 0    clindamycin (CLEOCIN) 150 mg capsule Take 3 Capsules by mouth three (3) times daily. 63 Capsule 0    carbamide peroxide (Debrox) 6.5 % otic solution Administer 5 Drops into each ear two (2) times a day. 7.5 mL 0    dextroamphetamine-amphetamine (ADDERALL) 30 mg tablet Take 1 Tab by mouth two (2) times a day. Max Daily Amount: 2 Tabs. 60 Tab 0    losartan (COZAAR) 50 mg tablet Take 1 Tab by mouth daily. 90 Tab 0    famotidine (PEPCID) 40 mg tablet       meloxicam (MOBIC) 15 mg tablet       calcium carbonate-vitamin D3 (CALTRATE 600 PLUS D) 600 mg (1,500 mg)-800 unit chew Take 1 Tab by mouth daily. 30 Tab 1    diclofenac (VOLTAREN) 1 % gel Apply 4 g to affected area four (4) times daily. USE AS DIRECTED 100 g 1    LORazepam (ATIVAN) 0.5 mg tablet Take 1 Tab by mouth two (2) times daily as needed for Anxiety for up to 30 days. Max Daily Amount: 1 mg. Indications: anxious 60 Tab 1    albuterol (PROVENTIL HFA, VENTOLIN HFA, PROAIR HFA) 90 mcg/actuation inhaler Take 1-2 Puffs by inhalation every four (4) hours as needed for Wheezing.  1 Inhaler 0       Past History     Past Medical History:  Past Medical History:   Diagnosis Date    ADD (attention deficit disorder)     Back injury     Back problem     Bipolar affective disorder ([facility] [address]     Depression     Fatty liver     Fractures     3 slip disk;     History of ear infections     Hypertension     Positive urine drug screen 10/25/2018    +cocaine and thc     Suicidal ideation        Past Surgical History:  Past Surgical History:   Procedure Laterality Date    HX TYMPANOSTOMY         Family History:  Family History   Problem Relation Age of Onset    Cancer Father         mesothelioma    Heart Disease Maternal Grandmother     Diabetes Maternal Grandmother        Social History:  Social History     Tobacco Use    Smoking status: Current Every Day Smoker     Packs/day: 1.00     Years: 12.00     Pack years: 12.00    Smokeless tobacco: Current User   Substance Use Topics    Alcohol use: Yes     Alcohol/week: 6.7 standard drinks     Types: 8 Cans of beer per week     Comment: social    Drug use: Yes     Types: Marijuana, Cocaine     Comment: states he has been using cocaine for the past week, 5/18/21       Allergies: Allergies   Allergen Reactions    Amoxicillin Swelling, Itching, Shortness of Breath and Unknown (comments)         Review of Systems   Review of Systems   Constitutional: Negative for fever. HENT: Positive for facial swelling. Negative for dental problem. All Other Systems Negative  Physical Exam     Vitals:    11/16/21 1856   BP: (!) 184/107   Pulse: 85   Resp: 16   Temp: 98.6 °F (37 °C)   SpO2: 96%   Weight: 134.3 kg (296 lb)   Height: 6' 1\" (1.854 m)     Physical Exam  Vitals and nursing note reviewed. Constitutional:       General: He is not in acute distress. Appearance: He is well-developed. He is not ill-appearing, toxic-appearing or diaphoretic. HENT:      Head: Normocephalic and atraumatic. Mouth/Throat:      Comments: Left soft tissue neck swelling. No definite parotid bgland TTP. No dental TTP. No trismus or drooling. Neck:      Thyroid: No thyromegaly. Vascular: No carotid bruit. Trachea: No tracheal deviation.    Cardiovascular: Rate and Rhythm: Normal rate and regular rhythm. Heart sounds: Normal heart sounds. No murmur heard. No friction rub. No gallop. Pulmonary:      Effort: Pulmonary effort is normal. No respiratory distress. Breath sounds: Normal breath sounds. No stridor. No wheezing or rales. Chest:      Chest wall: No tenderness. Abdominal:      General: There is no distension. Palpations: Abdomen is soft. There is no mass. Tenderness: There is no abdominal tenderness. There is no guarding or rebound. Musculoskeletal:         General: Normal range of motion. Cervical back: Normal range of motion and neck supple. Skin:     General: Skin is warm and dry. Coloration: Skin is not pale. Neurological:      Mental Status: He is alert. Psychiatric:         Speech: Speech normal.         Behavior: Behavior normal.         Thought Content: Thought content normal.         Judgment: Judgment normal.          Diagnostic Study Results     Labs -     Recent Results (from the past 12 hour(s))   CBC WITH AUTOMATED DIFF    Collection Time: 11/16/21  7:02 PM   Result Value Ref Range    WBC 8.9 4.6 - 13.2 K/uL    RBC 4.96 4.35 - 5.65 M/uL    HGB 15.3 13.0 - 16.0 g/dL    HCT 45.4 36.0 - 48.0 %    MCV 91.5 78.0 - 100.0 FL    MCH 30.8 24.0 - 34.0 PG    MCHC 33.7 31.0 - 37.0 g/dL    RDW 11.9 11.6 - 14.5 %    PLATELET 061 089 - 942 K/uL    MPV 9.0 (L) 9.2 - 11.8 FL    NRBC 0.0 0  WBC    ABSOLUTE NRBC 0.00 0.00 - 0.01 K/uL    NEUTROPHILS 62 40 - 73 %    LYMPHOCYTES 26 21 - 52 %    MONOCYTES 9 3 - 10 %    EOSINOPHILS 2 0 - 5 %    BASOPHILS 1 0 - 2 %    IMMATURE GRANULOCYTES 1 (H) 0.0 - 0.5 %    ABS. NEUTROPHILS 5.5 1.8 - 8.0 K/UL    ABS. LYMPHOCYTES 2.3 0.9 - 3.6 K/UL    ABS. MONOCYTES 0.8 0.05 - 1.2 K/UL    ABS. EOSINOPHILS 0.2 0.0 - 0.4 K/UL    ABS. BASOPHILS 0.1 0.0 - 0.1 K/UL    ABS. IMM.  GRANS. 0.1 (H) 0.00 - 0.04 K/UL    DF AUTOMATED     METABOLIC PANEL, BASIC    Collection Time: 11/16/21  7:02 PM Result Value Ref Range    Sodium 139 136 - 145 mmol/L    Potassium 3.7 3.5 - 5.5 mmol/L    Chloride 104 100 - 111 mmol/L    CO2 29 21 - 32 mmol/L    Anion gap 6 3.0 - 18 mmol/L    Glucose 124 (H) 74 - 99 mg/dL    BUN 12 7.0 - 18 MG/DL    Creatinine 0.92 0.6 - 1.3 MG/DL    BUN/Creatinine ratio 13 12 - 20      GFR est AA >60 >60 ml/min/1.73m2    GFR est non-AA >60 >60 ml/min/1.73m2    Calcium 9.6 8.5 - 10.1 MG/DL       Radiologic Studies -   CT NECK SOFT TISSUE W CONT   Final Result   :      Left submandibular gland duct stone with edema around the gland and surrounding   soft tissues. Mild burden chronic sinusitis. Remote left mastoiditis versus   under-pneumatization of left mastoid bone. Thank you for this referral.        CT Results  (Last 48 hours)               11/16/21 2050  CT NECK SOFT TISSUE W CONT Final result    Impression:  :       Left submandibular gland duct stone with edema around the gland and surrounding   soft tissues. Mild burden chronic sinusitis. Remote left mastoiditis versus   under-pneumatization of left mastoid bone. Thank you for this referral.       Narrative:  Exam: Neck CT with contrast       Clinical indication/history: Left-sided neck and lower facial swelling       Comparison: No priors on PACS       Technique:  After the intravenous administration of iodinated IV contrast, MDCT   was obtained from low head down to upper chest.  Sagittal and coronal   reformations were then created from the original data set. All CT scans at this   facility are performed using dose optimization techniques as appropriate to a   performed exam, to include automated exposure control, adjustment of the mA   and/or kV according to patient's size (including appropriate matching for site   specific examinations), or use of iterative reconstruction technique.        Findings:       Base of brain: Normal       Orbits: Globes symmetric       Paranasal sinuses: Mucosal disease both maxillary sinuses inferiorly. Thickened   septa at left mastoid bone. Middle ears normally aerated. Parapharyngeal space: Normal       Salivary glands: Mild enlargement left centimeter gland with surrounding   haziness. 5 mm submandibular gland duct stone adjacent to the gland. Overlying   thickening of the platysma, haziness in the subcutaneous fat and mild skin   thickening left neck. Parotid glands symmetric. Vascular structures: Normal       Lymph nodes: Tiny reactive lymph nodes       Thyroid: Normal       Superior chest: Normal       MSK/other: Normal               CXR Results  (Last 48 hours)    None            Medical Decision Making   I am the first provider for this patient. I reviewed the vital signs, available nursing notes, past medical history, past surgical history, family history and social history. Vital Signs-Reviewed the patient's vital signs. Records Reviewed: Nursing Notes    Procedures:  Procedures    Provider Notes (Medical Decision Making): submandibular duct stone -- advised ENT f/up, Ibuprofen. Thinks he saw the stone dislodge in ED WR bathroom tonight. MED RECONCILIATION:  No current facility-administered medications for this encounter. Current Outpatient Medications   Medication Sig    ibuprofen (MOTRIN) 800 mg tablet Take 1 Tablet by mouth every eight (8) hours for 5 days.  clindamycin (CLEOCIN) 150 mg capsule Take 3 Capsules by mouth three (3) times daily.  carbamide peroxide (Debrox) 6.5 % otic solution Administer 5 Drops into each ear two (2) times a day.  dextroamphetamine-amphetamine (ADDERALL) 30 mg tablet Take 1 Tab by mouth two (2) times a day. Max Daily Amount: 2 Tabs.  losartan (COZAAR) 50 mg tablet Take 1 Tab by mouth daily.  famotidine (PEPCID) 40 mg tablet     meloxicam (MOBIC) 15 mg tablet     calcium carbonate-vitamin D3 (CALTRATE 600 PLUS D) 600 mg (1,500 mg)-800 unit chew Take 1 Tab by mouth daily.     diclofenac (VOLTAREN) 1 % gel Apply 4 g to affected area four (4) times daily. USE AS DIRECTED    LORazepam (ATIVAN) 0.5 mg tablet Take 1 Tab by mouth two (2) times daily as needed for Anxiety for up to 30 days. Max Daily Amount: 1 mg. Indications: anxious    albuterol (PROVENTIL HFA, VENTOLIN HFA, PROAIR HFA) 90 mcg/actuation inhaler Take 1-2 Puffs by inhalation every four (4) hours as needed for Wheezing. Disposition:  home    DISCHARGE NOTE:   9:48 PM    Pt has been reexamined. Patient has no new complaints, changes, or physical findings. Care plan outlined and precautions discussed. Results of labs, CT were reviewed with the patient. All medications were reviewed with the patient; will d/c home with ibuprofen. All of pt's questions and concerns were addressed. Patient was instructed and agrees to follow up with ENT, as well as to return to the ED upon further deterioration. Patient is ready to go home. Follow-up Information     Follow up With Specialties Details Why Contact Info    Mariama Grimaldo MD Otolaryngology, Surgery Schedule an appointment as soon as possible for a visit in 1 day  [address] [facility] [address]  [phone]      [facility] DEPT Emergency Medicine  If symptoms worsen return immediately [address]  [phone]          Current Discharge Medication List      START taking these medications    Details   ibuprofen (MOTRIN) 800 mg tablet Take 1 Tablet by mouth every eight (8) hours for 5 days. Qty: 15 Tablet, Refills: 0  Start date: 11/16/2021, End date: 11/21/2021                 Diagnosis     Clinical Impression:   1.  Sialolithiasis of submandibular gland

## 2021-11-19 ENCOUNTER — HOSPITAL ENCOUNTER (EMERGENCY)
Age: 31
Discharge: LEFT BEFORE DISCHARGE | End: 2021-11-19
Payer: MEDICAID

## 2021-11-19 PROCEDURE — 75810000275 HC EMERGENCY DEPT VISIT NO LEVEL OF CARE

## 2022-03-06 ENCOUNTER — APPOINTMENT (OUTPATIENT)
Dept: GENERAL RADIOLOGY | Age: 32
End: 2022-03-06
Attending: PHYSICIAN ASSISTANT
Payer: MEDICAID

## 2022-03-06 ENCOUNTER — HOSPITAL ENCOUNTER (EMERGENCY)
Age: 32
Discharge: HOME | End: 2022-03-06
Attending: EMERGENCY MEDICINE
Payer: MEDICAID

## 2022-03-06 VITALS
TEMPERATURE: 98.9 F | HEART RATE: 86 BPM | RESPIRATION RATE: 18 BRPM | OXYGEN SATURATION: 99 % | DIASTOLIC BLOOD PRESSURE: 101 MMHG | SYSTOLIC BLOOD PRESSURE: 151 MMHG

## 2022-03-06 DIAGNOSIS — J06.9 URI WITH COUGH AND CONGESTION: Primary | ICD-10-CM

## 2022-03-06 LAB
ALBUMIN SERPL-MCNC: 3.7 G/DL (ref 3.4–5)
ALBUMIN/GLOB SERPL: 1.1 {RATIO} (ref 0.8–1.7)
ALP SERPL-CCNC: 96 U/L (ref 45–117)
ALT SERPL-CCNC: 143 U/L (ref 16–61)
ANION GAP SERPL CALC-SCNC: 3 MMOL/L (ref 3–18)
AST SERPL-CCNC: 88 U/L (ref 10–38)
BASOPHILS # BLD: 0.1 K/UL (ref 0–0.1)
BASOPHILS NFR BLD: 1 % (ref 0–2)
BILIRUB SERPL-MCNC: 0.3 MG/DL (ref 0.2–1)
BNP SERPL-MCNC: 9 PG/ML (ref 0–450)
BUN SERPL-MCNC: 14 MG/DL (ref 7–18)
BUN/CREAT SERPL: 17 (ref 12–20)
CALCIUM SERPL-MCNC: 9.4 MG/DL (ref 8.5–10.1)
CHLORIDE SERPL-SCNC: 106 MMOL/L (ref 100–111)
CO2 SERPL-SCNC: 27 MMOL/L (ref 21–32)
COVID-19 RAPID TEST, COVR: NOT DETECTED
CREAT SERPL-MCNC: 0.83 MG/DL (ref 0.6–1.3)
DIFFERENTIAL METHOD BLD: ABNORMAL
EOSINOPHIL # BLD: 0.3 K/UL (ref 0–0.4)
EOSINOPHIL NFR BLD: 3 % (ref 0–5)
ERYTHROCYTE [DISTWIDTH] IN BLOOD BY AUTOMATED COUNT: 12 % (ref 11.6–14.5)
GLOBULIN SER CALC-MCNC: 3.5 G/DL (ref 2–4)
GLUCOSE SERPL-MCNC: 106 MG/DL (ref 74–99)
HCT VFR BLD AUTO: 46.5 % (ref 36–48)
HGB BLD-MCNC: 15.9 G/DL (ref 13–16)
IMM GRANULOCYTES # BLD AUTO: 0.1 K/UL (ref 0–0.04)
IMM GRANULOCYTES NFR BLD AUTO: 1 % (ref 0–0.5)
LYMPHOCYTES # BLD: 2.7 K/UL (ref 0.9–3.6)
LYMPHOCYTES NFR BLD: 31 % (ref 21–52)
MCH RBC QN AUTO: 31.7 PG (ref 24–34)
MCHC RBC AUTO-ENTMCNC: 34.2 G/DL (ref 31–37)
MCV RBC AUTO: 92.8 FL (ref 78–100)
MONOCYTES # BLD: 1.1 K/UL (ref 0.05–1.2)
MONOCYTES NFR BLD: 12 % (ref 3–10)
NEUTS SEG # BLD: 4.6 K/UL (ref 1.8–8)
NEUTS SEG NFR BLD: 53 % (ref 40–73)
NRBC # BLD: 0 K/UL (ref 0–0.01)
NRBC BLD-RTO: 0 PER 100 WBC
PLATELET # BLD AUTO: 333 K/UL (ref 135–420)
PMV BLD AUTO: 9 FL (ref 9.2–11.8)
POTASSIUM SERPL-SCNC: 3.8 MMOL/L (ref 3.5–5.5)
PROT SERPL-MCNC: 7.2 G/DL (ref 6.4–8.2)
RBC # BLD AUTO: 5.01 M/UL (ref 4.35–5.65)
SODIUM SERPL-SCNC: 136 MMOL/L (ref 136–145)
SOURCE, COVRS: NORMAL
TROPONIN-HIGH SENSITIVITY: 5 NG/L (ref 0–78)
WBC # BLD AUTO: 8.8 K/UL (ref 4.6–13.2)

## 2022-03-06 PROCEDURE — 84484 ASSAY OF TROPONIN QUANT: CPT

## 2022-03-06 PROCEDURE — 71046 X-RAY EXAM CHEST 2 VIEWS: CPT

## 2022-03-06 PROCEDURE — 83880 ASSAY OF NATRIURETIC PEPTIDE: CPT

## 2022-03-06 PROCEDURE — 80053 COMPREHEN METABOLIC PANEL: CPT

## 2022-03-06 PROCEDURE — 99284 EMERGENCY DEPT VISIT MOD MDM: CPT

## 2022-03-06 PROCEDURE — 87635 SARS-COV-2 COVID-19 AMP PRB: CPT

## 2022-03-06 PROCEDURE — 85025 COMPLETE CBC W/AUTO DIFF WBC: CPT

## 2022-03-06 RX ORDER — BENZONATATE 100 MG/1
100 CAPSULE ORAL
Qty: 30 CAPSULE | Refills: 0 | Status: SHIPPED | OUTPATIENT
Start: 2022-03-06 | End: 2022-03-13

## 2022-03-06 RX ORDER — DOXYCYCLINE 100 MG/1
100 CAPSULE ORAL 2 TIMES DAILY
Qty: 14 CAPSULE | Refills: 0 | Status: SHIPPED | OUTPATIENT
Start: 2022-03-06 | End: 2022-03-13

## 2022-03-06 NOTE — Clinical Note
[address] [place] [address] Dr [facility] EMERGENCY DEPT  [address] [address] [phone]    Work/School Note    Date: 3/6/2022    To Whom It May concern:    Ricardo Thornton was seen and treated today in the emergency room by the following provider(s):  Attending Provider: Bryant Wing MD  Physician Assistant: WARREN Quezada. Ricardo Thornton is excused from work/school on 03/06/22 and 03/07/22. He is medically clear to return to work/school on 3/8/2022.        Sincerely,          WARREN Rolon

## 2022-03-07 NOTE — ED PROVIDER NOTES
[address] [facility],4Th Floor  [facility] EMERGENCY DEPT    Date: 3/6/2022  Patient Name: Sergio Crabtree    History of Presenting Illness     Chief Complaint   Patient presents with    Chest Congestion    Cough     32 y.o. male with noted past medical history presents the ED complaining of a cough and congestion over the past 3 to 4 days. Patient states his cough brings up green and brown sputum. He is a smoker. Denies any sore throat. Patient has not been tested or vaccinated for COVID. He denies any fever, chills, shortness of breath. States that he has an occasional pain in his chest with coughing. Denies any leg pain or swelling, history of PE/DVT, SOB, other symptoms. Patient denies any other associated signs or symptoms. Patient denies any other complaints. Nursing notes regarding the HPI and triage nursing notes were reviewed. Prior medical records were reviewed. Current Outpatient Medications   Medication Sig Dispense Refill    doxycycline (MONODOX) 100 mg capsule Take 1 Capsule by mouth two (2) times a day for 7 days. 14 Capsule 0    benzonatate (Tessalon Perles) 100 mg capsule Take 1 Capsule by mouth three (3) times daily as needed for Cough for up to 7 days. 30 Capsule 0    clindamycin (CLEOCIN) 150 mg capsule Take 3 Capsules by mouth three (3) times daily. 63 Capsule 0    carbamide peroxide (Debrox) 6.5 % otic solution Administer 5 Drops into each ear two (2) times a day. 7.5 mL 0    dextroamphetamine-amphetamine (ADDERALL) 30 mg tablet Take 1 Tab by mouth two (2) times a day. Max Daily Amount: 2 Tabs. 60 Tab 0    losartan (COZAAR) 50 mg tablet Take 1 Tab by mouth daily. 90 Tab 0    famotidine (PEPCID) 40 mg tablet       meloxicam (MOBIC) 15 mg tablet       calcium carbonate-vitamin D3 (CALTRATE 600 PLUS D) 600 mg (1,500 mg)-800 unit chew Take 1 Tab by mouth daily. 30 Tab 1    diclofenac (VOLTAREN) 1 % gel Apply 4 g to affected area four (4) times daily.  USE AS DIRECTED 100 g 1    LORazepam (ATIVAN) 0.5 mg tablet Take 1 Tab by mouth two (2) times daily as needed for Anxiety for up to 30 days. Max Daily Amount: 1 mg. Indications: anxious 60 Tab 1    albuterol (PROVENTIL HFA, VENTOLIN HFA, PROAIR HFA) 90 mcg/actuation inhaler Take 1-2 Puffs by inhalation every four (4) hours as needed for Wheezing. 1 Inhaler 0       Past History     Past Medical History:  Past Medical History:   Diagnosis Date    ADD (attention deficit disorder)     Back injury     Back problem     Bipolar affective disorder ([address]     Depression     Fatty liver     Fractures     3 slip disk;     History of ear infections     Hypertension     Positive urine drug screen 10/25/2018    +cocaine and thc     Suicidal ideation        Past Surgical History:  Past Surgical History:   Procedure Laterality Date    HX TYMPANOSTOMY         Family History:  Family History   Problem Relation Age of Onset    Cancer Father         mesothelioma    Heart Disease Maternal Grandmother     Diabetes Maternal Grandmother        Social History:  Social History     Tobacco Use    Smoking status: Current Every Day Smoker     Packs/day: 1.00     Years: 12.00     Pack years: 12.00    Smokeless tobacco: Current User   Substance Use Topics    Alcohol use: Yes     Alcohol/week: 6.7 standard drinks     Types: 8 Cans of beer per week     Comment: social    Drug use: Yes     Types: Marijuana, Cocaine     Comment: states he has been using cocaine for the past week, 5/18/21       Allergies: Allergies   Allergen Reactions    Amoxicillin Swelling, Itching, Shortness of Breath and Unknown (comments)       Patient's primary care provider (as noted in EPIC):  None    Review of Systems   Constitutional:  Denies malaise, fever, chills. ENMT:  Denies sore throat. Neck:  Denies injury or pain. Chest:  Denies injury. Cardiac:  Denies chest pain or palpitations. Respiratory: + cough, left chest pain sometimes with coughing.    GI/ABD: Denies injury, pain, distention, nausea, vomiting, diarrhea. :  Denies injury, pain, dysuria or urgency. Back:  Denies injury or pain. Neuro:  Denies headache, LOC, dizziness, neurologic symptoms/deficits/paresthesias. Skin: Denies injury, rash, itching or skin changes. All other systems negative as reviewed. Visit Vitals  BP (!) 151/101   Pulse 86   Temp 98.9 °F (37.2 °C)   Resp 18   SpO2 99%       PHYSICAL EXAM:    CONSTITUTIONAL:  Alert, in no apparent distress;  well developed;  well nourished. HEAD:  Normocephalic, atraumatic. EYES:  EOMI. Non-icteric sclera. Normal conjunctiva. ENTM:  Nose:  no rhinorrhea. Throat:  no erythema or exudate, mucous membranes moist.  NECK:  Supple  RESPIRATORY: Lung sounds clear throughout bilaterally. Good air movement. Without wheezes, rhonchi, rales. CARDIOVASCULAR:  Regular rate and rhythm. No murmurs, rubs, or gallops. NEURO:  Moves all four extremities, and grossly normal motor exam.  SKIN:  No rashes;  Normal for age. PSYCH:  Alert and normal affect. IMPRESSION AND MEDICAL DECISION MAKING:  Patient with a cough productive of green and brown sputum over the past 3 days. Vitals are unremarkable. Will cover with Doxy as he is having dark sputum. Patient is a smoker, may be attributed to that. Covid test pending. He will follow-up with his primary care doctor, take Tessalon as well, rest, drink plenty water, return for any acute worsening.     Diagnosis:   1. URI with cough and congestion      Disposition: Discharge    Follow-up Information     Follow up With Specialties Details Why [address] [employer]  In 3 days  [address], [address] [facility] [address]  [phone]    [facility] EMERGENCY DEPT Emergency Medicine  If symptoms worsen [address] [facility] [address]  [phone]          Discharge Medication List as of 3/6/2022 10:48 PM      START taking these medications    Details doxycycline (MONODOX) 100 mg capsule Take 1 Capsule by mouth two (2) times a day for 7 days. , Normal, Disp-14 Capsule, R-0      benzonatate (Tessalon Perles) 100 mg capsule Take 1 Capsule by mouth three (3) times daily as needed for Cough for up to 7 days. , Normal, Disp-30 Capsule, R-0         CONTINUE these medications which have NOT CHANGED    Details   clindamycin (CLEOCIN) 150 mg capsule Take 3 Capsules by mouth three (3) times daily. , Normal, Disp-63 Capsule, R-0      carbamide peroxide (Debrox) 6.5 % otic solution Administer 5 Drops into each ear two (2) times a day., Normal, Disp-7.5 mL, R-0      dextroamphetamine-amphetamine (ADDERALL) 30 mg tablet Take 1 Tab by mouth two (2) times a day. Max Daily Amount: 2 Tabs., Normal, Disp-60 Tab, R-0      losartan (COZAAR) 50 mg tablet Take 1 Tab by mouth daily. , Normal, Disp-90 Tab, R-0      famotidine (PEPCID) 40 mg tablet Historical Med      meloxicam (MOBIC) 15 mg tablet Historical Med      calcium carbonate-vitamin D3 (CALTRATE 600 PLUS D) 600 mg (1,500 mg)-800 unit chew Take 1 Tab by mouth daily. , Normal, Disp-30 Tab, R-1      diclofenac (VOLTAREN) 1 % gel Apply 4 g to affected area four (4) times daily. USE AS DIRECTED, Normal, Disp-100 g, R-1      LORazepam (ATIVAN) 0.5 mg tablet Take 1 Tab by mouth two (2) times daily as needed for Anxiety for up to 30 days. Max Daily Amount: 1 mg.  Indications: anxious, Print, Disp-60 Tab, R-1      albuterol (PROVENTIL HFA, VENTOLIN HFA, PROAIR HFA) 90 mcg/actuation inhaler Take 1-2 Puffs by inhalation every four (4) hours as needed for Wheezing., Sample, Disp-1 Inhaler, R-0           WARREN Red

## 2022-03-07 NOTE — ED NOTES
Provider has reviewed discharge instructions with the patient. The patient verbalized understanding. Pt discharged from ED ambulatory with steady gait noted, stable in no distress.

## 2022-03-07 NOTE — ED TRIAGE NOTES
Pt complaining chest congestion for the last few days. Pt states it hurts to take a deep breath. Endorses nasal congestion, and cough. Negative for covid vaccine.

## 2022-03-18 PROBLEM — F41.1 GENERALIZED ANXIETY DISORDER: Status: ACTIVE | Noted: 2017-12-19

## 2022-03-19 PROBLEM — K45.8 OTHER SPECIFIED ABDOMINAL HERNIA WITHOUT OBSTRUCTION OR GANGRENE: Status: ACTIVE | Noted: 2018-01-22

## 2022-03-19 PROBLEM — F90.2 ATTENTION DEFICIT HYPERACTIVITY DISORDER (ADHD), COMBINED TYPE: Status: ACTIVE | Noted: 2020-02-10

## 2022-03-19 PROBLEM — S92.352D CLOSED DISPLACED FRACTURE OF FIFTH METATARSAL BONE OF LEFT FOOT WITH ROUTINE HEALING: Status: ACTIVE | Noted: 2020-02-10

## 2022-03-19 PROBLEM — I10 ESSENTIAL HYPERTENSION: Status: ACTIVE | Noted: 2018-01-22

## 2022-03-20 PROBLEM — F98.8 ADULT ATTENTION DEFICIT DISORDER: Status: ACTIVE | Noted: 2018-01-22

## 2022-03-20 PROBLEM — E66.01 SEVERE OBESITY (HCC): Status: ACTIVE | Noted: 2019-03-06

## 2022-08-01 ENCOUNTER — APPOINTMENT (OUTPATIENT)
Dept: GENERAL RADIOLOGY | Age: 32
End: 2022-08-01
Attending: STUDENT IN AN ORGANIZED HEALTH CARE EDUCATION/TRAINING PROGRAM
Payer: MEDICAID

## 2022-08-01 ENCOUNTER — HOSPITAL ENCOUNTER (EMERGENCY)
Age: 32
Discharge: HOME | End: 2022-08-01
Attending: STUDENT IN AN ORGANIZED HEALTH CARE EDUCATION/TRAINING PROGRAM
Payer: MEDICAID

## 2022-08-01 VITALS
DIASTOLIC BLOOD PRESSURE: 105 MMHG | HEART RATE: 92 BPM | WEIGHT: 288.8 LBS | SYSTOLIC BLOOD PRESSURE: 161 MMHG | TEMPERATURE: 99.4 F | RESPIRATION RATE: 20 BRPM | HEIGHT: 73 IN | BODY MASS INDEX: 38.28 KG/M2 | OXYGEN SATURATION: 96 %

## 2022-08-01 DIAGNOSIS — Z72.0 TOBACCO ABUSE DISORDER: ICD-10-CM

## 2022-08-01 DIAGNOSIS — J20.9 ACUTE BRONCHITIS, UNSPECIFIED ORGANISM: Primary | ICD-10-CM

## 2022-08-01 PROCEDURE — 71046 X-RAY EXAM CHEST 2 VIEWS: CPT

## 2022-08-01 PROCEDURE — 99283 EMERGENCY DEPT VISIT LOW MDM: CPT

## 2022-08-01 RX ORDER — ALBUTEROL SULFATE 90 UG/1
1-2 AEROSOL, METERED RESPIRATORY (INHALATION)
Qty: 1 EACH | Refills: 0 | Status: SHIPPED | OUTPATIENT
Start: 2022-08-01

## 2022-08-01 RX ORDER — GUAIFENESIN 600 MG/1
600 TABLET, EXTENDED RELEASE ORAL 2 TIMES DAILY
Qty: 20 TABLET | Refills: 0 | Status: SHIPPED | OUTPATIENT
Start: 2022-08-01

## 2022-08-01 RX ORDER — FLUTICASONE PROPIONATE 50 MCG
2 SPRAY, SUSPENSION (ML) NASAL DAILY
Qty: 1 EACH | Refills: 0 | Status: SHIPPED | OUTPATIENT
Start: 2022-08-01

## 2022-08-01 RX ORDER — AZITHROMYCIN 250 MG/1
TABLET, FILM COATED ORAL
Qty: 6 TABLET | Refills: 0 | Status: SHIPPED | OUTPATIENT
Start: 2022-08-01 | End: 2022-08-06

## 2022-08-01 NOTE — ED PROVIDER NOTES
EMERGENCY DEPARTMENT HISTORY AND PHYSICAL EXAM    Date: 8/1/2022  Patient Name: Thea Ledesma    History of Presenting Illness     Chief Complaint   Patient presents with    Cough         History Provided By: Patient  Additional History (Context): Thea Ledesma is a 32 y.o. male with  tobacco abuse, bronchitis  who presents with productive cough for about 4 to 5 days as well as congestion in his chest.  He said he took 2 COVID tests at home and they were negative. He stopped smoking 4 days ago because of his symptoms. PCP: None    Current Outpatient Medications   Medication Sig Dispense Refill    albuterol (PROVENTIL HFA, VENTOLIN HFA, PROAIR HFA) 90 mcg/actuation inhaler Take 1-2 Puffs by inhalation every four (4) hours as needed for Wheezing. 1 Each 0    guaiFENesin ER (Mucinex) 600 mg ER tablet Take 1 Tablet by mouth two (2) times a day. 20 Tablet 0    azithromycin (Zithromax Z-Issac) 250 mg tablet Take two tablets on day one; then take one tablet daily until gone. 6 Tablet 0    fluticasone propionate (FLONASE) 50 mcg/actuation nasal spray Take 2 Sprays by Both Nostrils route in the morning. 1 Each 0    dextroamphetamine-amphetamine (ADDERALL) 30 mg tablet Take 1 Tab by mouth two (2) times a day. Max Daily Amount: 2 Tabs. 60 Tab 0    clindamycin (CLEOCIN) 150 mg capsule Take 3 Capsules by mouth three (3) times daily. 63 Capsule 0    carbamide peroxide (Debrox) 6.5 % otic solution Administer 5 Drops into each ear two (2) times a day. 7.5 mL 0    losartan (COZAAR) 50 mg tablet Take 1 Tab by mouth daily. 90 Tab 0    famotidine (PEPCID) 40 mg tablet  (Patient not taking: Reported on 8/1/2022)      meloxicam (MOBIC) 15 mg tablet  (Patient not taking: Reported on 8/1/2022)      calcium carbonate-vitamin D3 (CALTRATE 600 PLUS D) 600 mg (1,500 mg)-800 unit chew Take 1 Tab by mouth daily. 30 Tab 1    diclofenac (VOLTAREN) 1 % gel Apply 4 g to affected area four (4) times daily.  USE AS DIRECTED 100 g 1 LORazepam (ATIVAN) 0.5 mg tablet Take 1 Tab by mouth two (2) times daily as needed for Anxiety for up to 30 days. Max Daily Amount: 1 mg. Indications: anxious 60 Tab 1       Past History     Past Medical History:  Past Medical History:   Diagnosis Date    ADD (attention deficit disorder)     Back injury     Back problem     Bipolar affective disorder ([facility] [address]     Depression     Fatty liver     Fractures     3 slip disk; History of ear infections     Hypertension     Positive urine drug screen 10/25/2018    +cocaine and thc     Suicidal ideation        Past Surgical History:  Past Surgical History:   Procedure Laterality Date    HX TYMPANOSTOMY         Family History:  Family History   Problem Relation Age of Onset    Cancer Father         mesothelioma    Heart Disease Maternal Grandmother     Diabetes Maternal Grandmother        Social History:  Social History     Tobacco Use    Smoking status: Every Day     Packs/day: 1.00     Years: 12.00     Pack years: 12.00     Types: Cigarettes    Smokeless tobacco: Current   Substance Use Topics    Alcohol use: Yes     Alcohol/week: 6.7 standard drinks     Types: 8 Cans of beer per week     Comment: social    Drug use: Yes     Types: Marijuana, Cocaine     Comment: states he has been using cocaine for the past week, 5/18/21       Allergies: Allergies   Allergen Reactions    Amoxicillin Swelling, Itching, Shortness of Breath and Unknown (comments)         Review of Systems   Review of Systems   Constitutional:  Negative for fever. Respiratory:  Positive for cough and wheezing. Negative for shortness of breath. Cardiovascular:  Negative for chest pain. Gastrointestinal:  Negative for nausea and vomiting. All Other Systems Negative  Physical Exam     Vitals:    08/01/22 1623   BP: (!) 161/105   Pulse: 92   Resp: 20   Temp: 99.4 °F (37.4 °C)   SpO2: 96%   Weight: 131 kg (288 lb 12.8 oz)   Height: 6' 1\" (1.854 m)     Physical Exam  Vitals and nursing note reviewed. Constitutional:       General: He is not in acute distress. Appearance: He is well-developed. He is not ill-appearing, toxic-appearing or diaphoretic. HENT:      Head: Normocephalic and atraumatic. Neck:      Thyroid: No thyromegaly. Vascular: No carotid bruit. Trachea: No tracheal deviation. Cardiovascular:      Rate and Rhythm: Normal rate and regular rhythm. Heart sounds: Normal heart sounds. No murmur heard. No friction rub. No gallop. Pulmonary:      Effort: Pulmonary effort is normal. No respiratory distress. Breath sounds: No stridor. Rhonchi present. No wheezing or rales. Chest:      Chest wall: No tenderness. Abdominal:      General: There is no distension. Palpations: Abdomen is soft. There is no mass. Tenderness: There is no abdominal tenderness. There is no guarding or rebound. Musculoskeletal:         General: Normal range of motion. Cervical back: Normal range of motion and neck supple. Skin:     General: Skin is warm and dry. Coloration: Skin is not pale. Neurological:      Mental Status: He is alert and oriented to person, place, and time. Psychiatric:         Speech: Speech normal.         Behavior: Behavior normal.         Thought Content: Thought content normal.         Judgment: Judgment normal.            Diagnostic Study Results     Labs -   No results found for this or any previous visit (from the past 12 hour(s)). Radiologic Studies -   XR CHEST PA LAT   Final Result   No radiographic evidence of acute cardiopulmonary process. CT Results  (Last 48 hours)      None          CXR Results  (Last 48 hours)                 08/01/22 1639  XR CHEST PA LAT Final result    Impression:  No radiographic evidence of acute cardiopulmonary process. Narrative:  EXAM: XR CHEST PA LAT       CLINICAL INDICATION/HISTORY: 32 years Male. cough.    Additional History: None       TECHNIQUE: Frontal and lateral views of the chest COMPARISON: Chest radiograph 3/6/2022       FINDINGS:    Chronic elevation of the right hemidiaphragm. The cardiac silhouette appears   within normal limits. Pulmonary vasculature appears within normal limits. No   confluent airspace opacity is appreciated. No evidence of pleural effusion or   pneumothorax. No acute osseous abnormality appreciated. Medical Decision Making   I am the first provider for this patient. I reviewed the vital signs, available nursing notes, past medical history, past surgical history, family history and social history. Vital Signs-Reviewed the patient's vital signs. Records Reviewed: Nursing Notes and Old Medical Records    Procedures:  Procedures    Provider Notes (Medical Decision Making): Very rhonchorous and no pneumonia on chest x-ray treat his bronchitis and have him follow-up with his PCP. MED RECONCILIATION:  No current facility-administered medications for this encounter. Current Outpatient Medications   Medication Sig    albuterol (PROVENTIL HFA, VENTOLIN HFA, PROAIR HFA) 90 mcg/actuation inhaler Take 1-2 Puffs by inhalation every four (4) hours as needed for Wheezing. guaiFENesin ER (Mucinex) 600 mg ER tablet Take 1 Tablet by mouth two (2) times a day. azithromycin (Zithromax Z-Issac) 250 mg tablet Take two tablets on day one; then take one tablet daily until gone. fluticasone propionate (FLONASE) 50 mcg/actuation nasal spray Take 2 Sprays by Both Nostrils route in the morning. dextroamphetamine-amphetamine (ADDERALL) 30 mg tablet Take 1 Tab by mouth two (2) times a day. Max Daily Amount: 2 Tabs. clindamycin (CLEOCIN) 150 mg capsule Take 3 Capsules by mouth three (3) times daily. carbamide peroxide (Debrox) 6.5 % otic solution Administer 5 Drops into each ear two (2) times a day. losartan (COZAAR) 50 mg tablet Take 1 Tab by mouth daily.     famotidine (PEPCID) 40 mg tablet  (Patient not taking: Reported on 8/1/2022) meloxicam (MOBIC) 15 mg tablet  (Patient not taking: Reported on 8/1/2022)    calcium carbonate-vitamin D3 (CALTRATE 600 PLUS D) 600 mg (1,500 mg)-800 unit chew Take 1 Tab by mouth daily. diclofenac (VOLTAREN) 1 % gel Apply 4 g to affected area four (4) times daily. USE AS DIRECTED    LORazepam (ATIVAN) 0.5 mg tablet Take 1 Tab by mouth two (2) times daily as needed for Anxiety for up to 30 days. Max Daily Amount: 1 mg. Indications: anxious       Disposition:  home    DISCHARGE NOTE:   7:14 PM    Pt has been reexamined. Patient has no new complaints, changes, or physical findings. Care plan outlined and precautions discussed. Results of cxr were reviewed with the patient. All medications were reviewed with the patient; will d/c home with inhaler, mucinex, azithromycin. All of pt's questions and concerns were addressed. Patient was instructed and agrees to follow up with PCP, as well as to return to the ED upon further deterioration. Patient is ready to go home. Follow-up Information       Follow up With Specialties Details Why [address]  Schedule an appointment as soon as possible for a visit in 2 days  [address] [id number]  [phone]    [facility] EMERGENCY DEPT Emergency Medicine  If symptoms worsen return immediately [address]  [phone]            Current Discharge Medication List        START taking these medications    Details   guaiFENesin ER (Mucinex) 600 mg ER tablet Take 1 Tablet by mouth two (2) times a day. Qty: 20 Tablet, Refills: 0  Start date: 8/1/2022      azithromycin (Zithromax Z-Issac) 250 mg tablet Take two tablets on day one; then take one tablet daily until gone. Qty: 6 Tablet, Refills: 0  Start date: 8/1/2022, End date: 8/6/2022      fluticasone propionate (FLONASE) 50 mcg/actuation nasal spray Take 2 Sprays by Both Nostrils route in the morning.   Qty: 1 Each, Refills: 0  Start date: 8/1/2022 CONTINUE these medications which have CHANGED    Details   albuterol (PROVENTIL HFA, VENTOLIN HFA, PROAIR HFA) 90 mcg/actuation inhaler Take 1-2 Puffs by inhalation every four (4) hours as needed for Wheezing. Qty: 1 Each, Refills: 0  Start date: 8/1/2022    Associated Diagnoses: Tobacco abuse disorder             Diagnosis     Clinical Impression:   1. Acute bronchitis, unspecified organism    2.  Tobacco abuse disorder

## 2022-08-01 NOTE — Clinical Note
[address] [place] [address] Dr [facility] EMERGENCY DEPT  [address] [address] [phone]    Work/School Note    Date: 8/1/2022    To Whom It May concern:    Jena Huynh was seen and treated today in the emergency room by the following provider(s):  Attending Provider: Maddi Velazquez DO  Physician Assistant: WARREN Brock. Jena Huynh is excused from work/school on 08/01/22 and 08/02/22. He is medically clear to return to work/school on 8/3/2022.        Sincerely,          WARREN Carrion

## 2022-08-01 NOTE — Clinical Note
[facility] EMERGENC[facility] DEPT  [address] [address] [phone]    Work/School Note    Date: 8/1/2022    To Whom It May concern:    Huyen Guy was seen and treated today in the emergency room by the following provider(s):  Attending Provider: Isma Lui DO  Physician Assistant: WARREN Correa. Huyen Guy is excused from work/school on 08/01/22 and 08/02/22. He is medically clear to return to work/school on 8/3/2022.        Sincerely,          WARREN Mcmahon

## 2023-06-19 ENCOUNTER — HOSPITAL ENCOUNTER (EMERGENCY)
Facility: HOSPITAL | Age: 33
Discharge: HOME | End: 2023-06-20
Attending: STUDENT IN AN ORGANIZED HEALTH CARE EDUCATION/TRAINING PROGRAM
Payer: MEDICAID

## 2023-06-19 DIAGNOSIS — H66.93 ACUTE BACTERIAL OTITIS MEDIA, BILATERAL: Primary | ICD-10-CM

## 2023-06-19 DIAGNOSIS — I10 ESSENTIAL HYPERTENSION: ICD-10-CM

## 2023-06-19 DIAGNOSIS — J02.9 ACUTE PHARYNGITIS, UNSPECIFIED ETIOLOGY: ICD-10-CM

## 2023-06-19 LAB — DEPRECATED S PYO AG THROAT QL EIA: NEGATIVE

## 2023-06-19 PROCEDURE — 99283 EMERGENCY DEPT VISIT LOW MDM: CPT

## 2023-06-19 PROCEDURE — 87070 CULTURE OTHR SPECIMN AEROBIC: CPT

## 2023-06-19 PROCEDURE — 87880 STREP A ASSAY W/OPTIC: CPT

## 2023-06-19 ASSESSMENT — PAIN SCALES - GENERAL: PAINLEVEL_OUTOF10: 2

## 2023-06-20 VITALS
HEART RATE: 77 BPM | SYSTOLIC BLOOD PRESSURE: 169 MMHG | BODY MASS INDEX: 38.17 KG/M2 | RESPIRATION RATE: 16 BRPM | OXYGEN SATURATION: 100 % | DIASTOLIC BLOOD PRESSURE: 105 MMHG | WEIGHT: 288 LBS | HEIGHT: 73 IN | TEMPERATURE: 98.8 F

## 2023-06-20 RX ORDER — AZITHROMYCIN 250 MG/1
TABLET, FILM COATED ORAL
Qty: 1 PACKET | Refills: 0 | Status: SHIPPED | OUTPATIENT
Start: 2023-06-20 | End: 2023-06-24

## 2023-06-21 LAB
BACTERIA SPEC CULT: NORMAL
SERVICE CMNT-IMP: NORMAL

## 2023-06-24 NOTE — ED PROVIDER NOTES
EMERGENCY DEPARTMENT HISTORY AND PHYSICAL EXAM    Date: 6/19/2023  Patient Name: Brandi Santamaria    History of Presenting Illness     Chief Complaint:   Chief Complaint   Patient presents with    Pharyngitis    Otalgia     History Provided By: patient    Additional History (Context): Brandi Santamaria is a 28 y.o. male with hx of recurrent ear infections, tympanostomy, ADD and psych hx, ambulatory to ED c/o sore throat and BL ear pain x 1 week not better with OTCs and supportive care. Pt felt feverish earlier in the week. Reports swollen lymph nodes and nasal congestion and PND. Pt reports sick contacts, his son has similar s/s now. Denies cough, CP, wheezing, SOB, weight loss, abd pain, NVD. PCP: None None    No current facility-administered medications for this encounter. Current Outpatient Medications   Medication Sig Dispense Refill    azithromycin (ZITHROMAX Z-JULIEN) 250 MG tablet Take 2 tablets (500 mg) on Day 1, and then take 1 tablet (250 mg) on days 2 through 5. 1 packet 0    albuterol sulfate HFA (PROVENTIL;VENTOLIN;PROAIR) 108 (90 Base) MCG/ACT inhaler Inhale 1-2 puffs into the lungs every 4 hours as needed      amphetamine-dextroamphetamine (ADDERALL) 30 MG tablet Take 1 tablet by mouth 2 times daily.       Calcium Carb-Cholecalciferol 600-20 MG-MCG CHEW Take 1 tablet by mouth daily      carbamide peroxide (DEBROX) 6.5 % otic solution Place 5 drops in ear(s) 2 times daily      clindamycin (CLEOCIN) 150 MG capsule Take 450 mg by mouth 3 times daily      diclofenac sodium (VOLTAREN) 1 % GEL Apply 4 g topically 4 times daily      famotidine (PEPCID) 40 MG tablet ceived the following from [facility]: Outside name: famotidine (PEPCID) 40 mg tablet      fluticasone (FLONASE) 50 MCG/ACT nasal spray 2 sprays by Nasal route daily      guaiFENesin (MUCINEX) 600 MG extended release tablet Take 600 mg by mouth 2 times daily      LORazepam (ATIVAN) 0.5 MG tablet Take 0.5 mg by mouth

## 2023-06-27 LAB
BACTERIA SPEC CULT: NORMAL
SERVICE CMNT-IMP: NORMAL

## 2024-01-28 ENCOUNTER — HOSPITAL ENCOUNTER (EMERGENCY)
Facility: HOSPITAL | Age: 34
Discharge: HOME | End: 2024-01-29
Attending: EMERGENCY MEDICINE
Payer: MEDICAID

## 2024-01-28 VITALS
DIASTOLIC BLOOD PRESSURE: 118 MMHG | HEIGHT: 73 IN | HEART RATE: 119 BPM | TEMPERATURE: 98.4 F | SYSTOLIC BLOOD PRESSURE: 188 MMHG | WEIGHT: 315 LBS | RESPIRATION RATE: 18 BRPM | BODY MASS INDEX: 41.75 KG/M2 | OXYGEN SATURATION: 98 %

## 2024-01-28 DIAGNOSIS — R42 LIGHTHEADEDNESS: Primary | ICD-10-CM

## 2024-01-28 DIAGNOSIS — F19.10 POLYSUBSTANCE ABUSE (HCC): ICD-10-CM

## 2024-01-28 LAB
ALBUMIN SERPL-MCNC: 4.2 G/DL (ref 3.4–5)
ALBUMIN/GLOB SERPL: 1.3 (ref 0.8–1.7)
ALP SERPL-CCNC: 108 U/L (ref 45–117)
ALT SERPL-CCNC: 175 U/L (ref 16–61)
ANION GAP SERPL CALC-SCNC: 6 MMOL/L (ref 3–18)
AST SERPL-CCNC: 88 U/L (ref 10–38)
BASOPHILS # BLD: 0.1 K/UL (ref 0–0.1)
BASOPHILS NFR BLD: 1 % (ref 0–2)
BILIRUB SERPL-MCNC: 0.3 MG/DL (ref 0.2–1)
BUN SERPL-MCNC: 9 MG/DL (ref 7–18)
BUN/CREAT SERPL: 10 (ref 12–20)
CALCIUM SERPL-MCNC: 9.1 MG/DL (ref 8.5–10.1)
CHLORIDE SERPL-SCNC: 106 MMOL/L (ref 100–111)
CO2 SERPL-SCNC: 26 MMOL/L (ref 21–32)
CREAT SERPL-MCNC: 0.93 MG/DL (ref 0.6–1.3)
DIFFERENTIAL METHOD BLD: ABNORMAL
EOSINOPHIL # BLD: 0.2 K/UL (ref 0–0.4)
EOSINOPHIL NFR BLD: 1 % (ref 0–5)
ERYTHROCYTE [DISTWIDTH] IN BLOOD BY AUTOMATED COUNT: 13 % (ref 11.6–14.5)
ETHANOL SERPL-MCNC: 5 MG/DL (ref 0–3)
GLOBULIN SER CALC-MCNC: 3.2 G/DL (ref 2–4)
GLUCOSE SERPL-MCNC: 123 MG/DL (ref 74–99)
HCT VFR BLD AUTO: 46.7 % (ref 36–48)
HGB BLD-MCNC: 15.8 G/DL (ref 13–16)
IMM GRANULOCYTES # BLD AUTO: 0.1 K/UL (ref 0–0.04)
IMM GRANULOCYTES NFR BLD AUTO: 1 % (ref 0–0.5)
LYMPHOCYTES # BLD: 4.7 K/UL (ref 0.9–3.6)
LYMPHOCYTES NFR BLD: 38 % (ref 21–52)
MAGNESIUM SERPL-MCNC: 2.1 MG/DL (ref 1.6–2.6)
MCH RBC QN AUTO: 31.6 PG (ref 24–34)
MCHC RBC AUTO-ENTMCNC: 33.8 G/DL (ref 31–37)
MCV RBC AUTO: 93.4 FL (ref 78–100)
MONOCYTES # BLD: 1.7 K/UL (ref 0.05–1.2)
MONOCYTES NFR BLD: 14 % (ref 3–10)
NEUTS SEG # BLD: 5.6 K/UL (ref 1.8–8)
NEUTS SEG NFR BLD: 45 % (ref 40–73)
NRBC # BLD: 0 K/UL (ref 0–0.01)
NRBC BLD-RTO: 0 PER 100 WBC
PLATELET # BLD AUTO: 374 K/UL (ref 135–420)
PMV BLD AUTO: 9.3 FL (ref 9.2–11.8)
POTASSIUM SERPL-SCNC: 3.8 MMOL/L (ref 3.5–5.5)
PROT SERPL-MCNC: 7.4 G/DL (ref 6.4–8.2)
RBC # BLD AUTO: 5 M/UL (ref 4.35–5.65)
SODIUM SERPL-SCNC: 138 MMOL/L (ref 136–145)
TROPONIN I SERPL HS-MCNC: 7 NG/L (ref 0–78)
WBC # BLD AUTO: 12.3 K/UL (ref 4.6–13.2)

## 2024-01-28 PROCEDURE — 96360 HYDRATION IV INFUSION INIT: CPT

## 2024-01-28 PROCEDURE — 93005 ELECTROCARDIOGRAM TRACING: CPT | Performed by: EMERGENCY MEDICINE

## 2024-01-28 PROCEDURE — 84484 ASSAY OF TROPONIN QUANT: CPT

## 2024-01-28 PROCEDURE — 83735 ASSAY OF MAGNESIUM: CPT

## 2024-01-28 PROCEDURE — 2580000003 HC RX 258: Performed by: EMERGENCY MEDICINE

## 2024-01-28 PROCEDURE — 80053 COMPREHEN METABOLIC PANEL: CPT

## 2024-01-28 PROCEDURE — 96361 HYDRATE IV INFUSION ADD-ON: CPT

## 2024-01-28 PROCEDURE — 85025 COMPLETE CBC W/AUTO DIFF WBC: CPT

## 2024-01-28 PROCEDURE — 82077 ASSAY SPEC XCP UR&BREATH IA: CPT

## 2024-01-28 PROCEDURE — 99284 EMERGENCY DEPT VISIT MOD MDM: CPT

## 2024-01-28 RX ORDER — 0.9 % SODIUM CHLORIDE 0.9 %
1000 INTRAVENOUS SOLUTION INTRAVENOUS ONCE
Status: COMPLETED | OUTPATIENT
Start: 2024-01-28 | End: 2024-01-28

## 2024-01-28 RX ADMIN — SODIUM CHLORIDE 1000 ML: 9 INJECTION, SOLUTION INTRAVENOUS at 22:18

## 2024-01-28 ASSESSMENT — LIFESTYLE VARIABLES
HOW OFTEN DO YOU HAVE A DRINK CONTAINING ALCOHOL: 2-4 TIMES A MONTH
HOW MANY STANDARD DRINKS CONTAINING ALCOHOL DO YOU HAVE ON A TYPICAL DAY: 3 OR 4

## 2024-01-28 ASSESSMENT — PAIN - FUNCTIONAL ASSESSMENT: PAIN_FUNCTIONAL_ASSESSMENT: NONE - DENIES PAIN

## 2024-01-29 LAB
AMPHET UR QL SCN: NEGATIVE
APPEARANCE UR: CLEAR
BARBITURATES UR QL SCN: NEGATIVE
BENZODIAZ UR QL: NEGATIVE
BILIRUB UR QL: NEGATIVE
CANNABINOIDS UR QL SCN: POSITIVE
COCAINE UR QL SCN: POSITIVE
COLOR UR: YELLOW
EKG ATRIAL RATE: 107 BPM
EKG DIAGNOSIS: NORMAL
EKG P AXIS: 102 DEGREES
EKG P-R INTERVAL: 242 MS
EKG Q-T INTERVAL: 320 MS
EKG QRS DURATION: 86 MS
EKG QTC CALCULATION (BAZETT): 427 MS
EKG R AXIS: 20 DEGREES
EKG T AXIS: 59 DEGREES
EKG VENTRICULAR RATE: 107 BPM
GLUCOSE UR STRIP.AUTO-MCNC: NEGATIVE MG/DL
HGB UR QL STRIP: NEGATIVE
KETONES UR QL STRIP.AUTO: NEGATIVE MG/DL
LEUKOCYTE ESTERASE UR QL STRIP.AUTO: NEGATIVE
Lab: ABNORMAL
METHADONE UR QL: NEGATIVE
NITRITE UR QL STRIP.AUTO: NEGATIVE
OPIATES UR QL: NEGATIVE
PCP UR QL: NEGATIVE
PH UR STRIP: 5.5 (ref 5–8)
PROT UR STRIP-MCNC: NEGATIVE MG/DL
SP GR UR REFRACTOMETRY: 1.01 (ref 1–1.03)
TROPONIN I SERPL HS-MCNC: 7 NG/L (ref 0–78)
UROBILINOGEN UR QL STRIP.AUTO: 0.2 EU/DL (ref 0.2–1)

## 2024-01-29 PROCEDURE — 81003 URINALYSIS AUTO W/O SCOPE: CPT

## 2024-01-29 PROCEDURE — 84484 ASSAY OF TROPONIN QUANT: CPT

## 2024-01-29 PROCEDURE — 80307 DRUG TEST PRSMV CHEM ANLYZR: CPT

## 2024-01-29 PROCEDURE — 93010 ELECTROCARDIOGRAM REPORT: CPT | Performed by: INTERNAL MEDICINE

## 2024-01-29 NOTE — ED PROVIDER NOTES
Scale   (!) 188/118 98.4 °F (36.9 °C) Oral (!) 119 18 98 % 1.854 m (6' 1\") (!) 142.9 kg (315 lb)     CONST: Awake and alert. VS are noted including hypertension and tachycardia  HEAD: NC/AT  EYES: PERRL. EOMI. Conjunctiva normal bilaterally. Sclera anicteric  ENT: NP - no discharge or epistaxis. OP - mmm. No erythema or exudate.  NECK: supple. No significant lymphadenopathy. No midline tenderness, crepitus or step-off.  CV: RRR. No M,R,G. 2+ radial and pedal pulses bilaterally.  RESPIRATORY CHEST: CTAB, no wheezes, rales or rhonchi. No increased work of breathing. No chestwall TTP, crepitus or stepoff.   ABD: soft, NT, ND, NABS. No HSM  : deferred  BACK: No midline tenderness, crepitus, or step-off. No CVAT  UPPER EXTREMITY: No deformity, clubbing, cyanosis, edema.   LOWER EXTREMITY: No deformity, clubbing, cyanosis, edema. Equal calf circumference bilaterally.   LYMPHATIC: No cervical lymphadenopathy.   NEURO: Normal mental status.  Cranial nerves II through XII, strength, sensation, finger-nose, heel-to-shin and gait are normal  SKIN: Warm and dry. No rashes.   PSYCHIATRIC: Oriented x 3. Normal affect, judgment, insight and concentration.       DIAGNOSTIC RESULTS   LABS:    Labs Reviewed   CBC WITH AUTO DIFFERENTIAL   URINALYSIS   COMPREHENSIVE METABOLIC PANEL   MAGNESIUM   TROPONIN   TROPONIN   ETHANOL   URINE DRUG SCREEN       All other labs were within normal range or not returned as of thisdictation.      IMAGING  No orders to display     No results found.   No results found.       ED Course as of 01/29/24 0144   Sun Jan 28, 2024 2205 EKG 12 Lead  My independent review of the EKG is sinus tachycardia at 107.  Borderline first-degree AV block.  Other intervals unremarkable.  No significant ST or T wave changes [SH]   2309 Labs unremarkable at this time. []   Mon Jan 29, 2024   0142 Other than admitted polysubstance abuse, workup today is unremarkable.  Patient feeling better after IV fluids.  Will

## 2024-01-29 NOTE — ED TRIAGE NOTES
Pt arrived via Triage ambulatory c/o dizziness. Pt states he was out drinking yesterday. Usually drinks every 4 or 5 days. Tonight he had a THC gummy and was smoking some THC via a vape.

## 2024-01-29 NOTE — ED NOTES
Assumed care of pt.  Pt resting in position of comfort, NAD at this time, complete monitor in place, s/o at bedside.

## 2024-01-29 NOTE — DISCHARGE INSTRUCTIONS
As discussed, marijuana and other illicit drugs put you at risk for significant health problems.  Strongly encouraged against the use of these substances.  Please be sure to remain well-hydrated.  Follow-up with a primary care provider of your choice in the coming week.  Return as needed

## 2024-07-04 ENCOUNTER — APPOINTMENT (OUTPATIENT)
Facility: HOSPITAL | Age: 34
End: 2024-07-04
Payer: MEDICAID

## 2024-07-04 ENCOUNTER — HOSPITAL ENCOUNTER (EMERGENCY)
Facility: HOSPITAL | Age: 34
Discharge: HOME | End: 2024-07-04
Payer: MEDICAID

## 2024-07-04 VITALS
BODY MASS INDEX: 39.76 KG/M2 | OXYGEN SATURATION: 97 % | HEIGHT: 73 IN | DIASTOLIC BLOOD PRESSURE: 97 MMHG | SYSTOLIC BLOOD PRESSURE: 143 MMHG | TEMPERATURE: 97.1 F | WEIGHT: 300 LBS | RESPIRATION RATE: 18 BRPM | HEART RATE: 66 BPM

## 2024-07-04 DIAGNOSIS — R07.9 CHEST PAIN, UNSPECIFIED TYPE: Primary | ICD-10-CM

## 2024-07-04 DIAGNOSIS — M25.512 ACUTE PAIN OF LEFT SHOULDER: ICD-10-CM

## 2024-07-04 LAB
ALBUMIN SERPL-MCNC: 4 G/DL (ref 3.4–5)
ALBUMIN/GLOB SERPL: 1.2 (ref 0.8–1.7)
ALP SERPL-CCNC: 113 U/L (ref 45–117)
ALT SERPL-CCNC: 74 U/L (ref 16–61)
AMPHET UR QL SCN: NEGATIVE
ANION GAP SERPL CALC-SCNC: 6 MMOL/L (ref 3–18)
AST SERPL-CCNC: 40 U/L (ref 10–38)
BARBITURATES UR QL SCN: NEGATIVE
BASOPHILS # BLD: 0.1 K/UL (ref 0–0.1)
BASOPHILS NFR BLD: 1 % (ref 0–2)
BENZODIAZ UR QL: NEGATIVE
BILIRUB SERPL-MCNC: 0.6 MG/DL (ref 0.2–1)
BUN SERPL-MCNC: 13 MG/DL (ref 7–18)
BUN/CREAT SERPL: 14 (ref 12–20)
CALCIUM SERPL-MCNC: 9.7 MG/DL (ref 8.5–10.1)
CANNABINOIDS UR QL SCN: NEGATIVE
CHLORIDE SERPL-SCNC: 106 MMOL/L (ref 100–111)
CO2 SERPL-SCNC: 26 MMOL/L (ref 21–32)
COCAINE UR QL SCN: POSITIVE
CREAT SERPL-MCNC: 0.9 MG/DL (ref 0.6–1.3)
D DIMER PPP FEU-MCNC: <0.27 UG/ML(FEU)
DIFFERENTIAL METHOD BLD: ABNORMAL
EOSINOPHIL # BLD: 0.1 K/UL (ref 0–0.4)
EOSINOPHIL NFR BLD: 2 % (ref 0–5)
ERYTHROCYTE [DISTWIDTH] IN BLOOD BY AUTOMATED COUNT: 12 % (ref 11.6–14.5)
GLOBULIN SER CALC-MCNC: 3.4 G/DL (ref 2–4)
GLUCOSE SERPL-MCNC: 111 MG/DL (ref 74–99)
HCT VFR BLD AUTO: 44.2 % (ref 36–48)
HGB BLD-MCNC: 15.6 G/DL (ref 13–16)
IMM GRANULOCYTES # BLD AUTO: 0 K/UL (ref 0–0.04)
IMM GRANULOCYTES NFR BLD AUTO: 1 % (ref 0–0.5)
LYMPHOCYTES # BLD: 2.9 K/UL (ref 0.9–3.6)
LYMPHOCYTES NFR BLD: 37 % (ref 21–52)
Lab: ABNORMAL
MAGNESIUM SERPL-MCNC: 2.1 MG/DL (ref 1.6–2.6)
MCH RBC QN AUTO: 31.6 PG (ref 24–34)
MCHC RBC AUTO-ENTMCNC: 35.3 G/DL (ref 31–37)
MCV RBC AUTO: 89.5 FL (ref 78–100)
METHADONE UR QL: NEGATIVE
MONOCYTES # BLD: 0.9 K/UL (ref 0.05–1.2)
MONOCYTES NFR BLD: 11 % (ref 3–10)
NEUTS SEG # BLD: 4 K/UL (ref 1.8–8)
NEUTS SEG NFR BLD: 50 % (ref 40–73)
NRBC # BLD: 0 K/UL (ref 0–0.01)
NRBC BLD-RTO: 0 PER 100 WBC
NT PRO BNP: 40 PG/ML (ref 0–450)
OPIATES UR QL: NEGATIVE
PCP UR QL: NEGATIVE
PLATELET # BLD AUTO: 349 K/UL (ref 135–420)
PMV BLD AUTO: 9.2 FL (ref 9.2–11.8)
POTASSIUM SERPL-SCNC: 3.9 MMOL/L (ref 3.5–5.5)
PROT SERPL-MCNC: 7.4 G/DL (ref 6.4–8.2)
RBC # BLD AUTO: 4.94 M/UL (ref 4.35–5.65)
SODIUM SERPL-SCNC: 138 MMOL/L (ref 136–145)
TROPONIN I SERPL HS-MCNC: 5 NG/L (ref 0–78)
TROPONIN I SERPL HS-MCNC: 5 NG/L (ref 0–78)
WBC # BLD AUTO: 8 K/UL (ref 4.6–13.2)

## 2024-07-04 PROCEDURE — 80053 COMPREHEN METABOLIC PANEL: CPT

## 2024-07-04 PROCEDURE — 93005 ELECTROCARDIOGRAM TRACING: CPT | Performed by: STUDENT IN AN ORGANIZED HEALTH CARE EDUCATION/TRAINING PROGRAM

## 2024-07-04 PROCEDURE — 85379 FIBRIN DEGRADATION QUANT: CPT

## 2024-07-04 PROCEDURE — 83880 ASSAY OF NATRIURETIC PEPTIDE: CPT

## 2024-07-04 PROCEDURE — 85025 COMPLETE CBC W/AUTO DIFF WBC: CPT

## 2024-07-04 PROCEDURE — 71046 X-RAY EXAM CHEST 2 VIEWS: CPT

## 2024-07-04 PROCEDURE — 93005 ELECTROCARDIOGRAM TRACING: CPT

## 2024-07-04 PROCEDURE — 96374 THER/PROPH/DIAG INJ IV PUSH: CPT

## 2024-07-04 PROCEDURE — 74174 CTA ABD&PLVS W/CONTRAST: CPT

## 2024-07-04 PROCEDURE — 83735 ASSAY OF MAGNESIUM: CPT

## 2024-07-04 PROCEDURE — 80307 DRUG TEST PRSMV CHEM ANLYZR: CPT

## 2024-07-04 PROCEDURE — 99285 EMERGENCY DEPT VISIT HI MDM: CPT

## 2024-07-04 PROCEDURE — 6360000004 HC RX CONTRAST MEDICATION

## 2024-07-04 PROCEDURE — 6360000002 HC RX W HCPCS

## 2024-07-04 PROCEDURE — 84484 ASSAY OF TROPONIN QUANT: CPT

## 2024-07-04 RX ORDER — MORPHINE SULFATE 4 MG/ML
4 INJECTION, SOLUTION INTRAMUSCULAR; INTRAVENOUS
Status: COMPLETED | OUTPATIENT
Start: 2024-07-04 | End: 2024-07-04

## 2024-07-04 RX ADMIN — MORPHINE SULFATE 4 MG: 4 INJECTION, SOLUTION INTRAMUSCULAR; INTRAVENOUS at 20:37

## 2024-07-04 RX ADMIN — IOPAMIDOL 100 ML: 755 INJECTION, SOLUTION INTRAVENOUS at 20:05

## 2024-07-04 ASSESSMENT — PAIN SCALES - GENERAL
PAINLEVEL_OUTOF10: 8
PAINLEVEL_OUTOF10: 10

## 2024-07-04 ASSESSMENT — PAIN DESCRIPTION - LOCATION
LOCATION: CHEST;SHOULDER
LOCATION: CHEST;SHOULDER

## 2024-07-04 ASSESSMENT — PAIN DESCRIPTION - ORIENTATION
ORIENTATION: LEFT
ORIENTATION: LEFT

## 2024-07-04 ASSESSMENT — PAIN - FUNCTIONAL ASSESSMENT: PAIN_FUNCTIONAL_ASSESSMENT: 0-10

## 2024-07-04 NOTE — ED PROVIDER NOTES
EMERGENCY DEPARTMENT HISTORY AND PHYSICAL EXAM      Patient Name: Chaparro Florian  MRN: [id number]  YOB: 1990  Provider: Nimisha Morris PA-C  PCP: None, None   Time/Date of evaluation: 6:36 PM EDT on 7/4/24    History of Presenting Illness     Chief Complaint   Patient presents with    Chest Pain    Shoulder Pain       History Provided By: Patient     History (Narative):   Chaparro Florian is a 33 y.o. male with a PMHX of of ADHD, back injury, both bipolar affective disorder, hypertension, cocaine abuse  who presents to the emergency department  by POV C/O of chest pain.  He states that around 10 AM this morning he developed sharp chest pain.  That was substernal.  However it has progressed, and now it is radiating to his left shoulder, and shoulder blade.  Patient states that the pain is 10 out of 10.  He states that it is difficult for him to lift his arm.  Patient describes it as a sharp pain, and states that he feels like a knife is being driven into the back of his back    He states that he supposed to be on blood pressure medication, however he does not remember which 1.  Denies any prior cardiac history, any shortness of breath fever, cough, congestion, any falls or injuries.    Past History     Past Medical History:  Past Medical History:   Diagnosis Date    ADD (attention deficit disorder)     Back injury     Back problem     Bipolar affective disorder (HCC)     Depression     Fatty liver     Fractures     3 slip disk;     History of ear infections     Hypertension     Positive urine drug screen 10/25/2018    +cocaine and thc     Suicidal ideation        Past Surgical History:  Past Surgical History:   Procedure Laterality Date    TYMPANOSTOMY TUBE PLACEMENT         Family History:  Family History   Problem Relation Age of Onset    Cancer Father         mesothelioma    Heart Disease Maternal Grandmother     Diabetes Maternal Grandmother        Social History:  Social History

## 2024-07-05 LAB
EKG ATRIAL RATE: 62 BPM
EKG ATRIAL RATE: 78 BPM
EKG DIAGNOSIS: NORMAL
EKG DIAGNOSIS: NORMAL
EKG P AXIS: 24 DEGREES
EKG P AXIS: 39 DEGREES
EKG P-R INTERVAL: 162 MS
EKG P-R INTERVAL: 168 MS
EKG Q-T INTERVAL: 386 MS
EKG Q-T INTERVAL: 420 MS
EKG QRS DURATION: 90 MS
EKG QRS DURATION: 92 MS
EKG QTC CALCULATION (BAZETT): 426 MS
EKG QTC CALCULATION (BAZETT): 440 MS
EKG R AXIS: 16 DEGREES
EKG R AXIS: 36 DEGREES
EKG T AXIS: 47 DEGREES
EKG T AXIS: 56 DEGREES
EKG VENTRICULAR RATE: 62 BPM
EKG VENTRICULAR RATE: 78 BPM

## 2024-11-21 ENCOUNTER — HOSPITAL ENCOUNTER (EMERGENCY)
Facility: HOSPITAL | Age: 34
Discharge: HOME | End: 2024-11-21
Attending: EMERGENCY MEDICINE
Payer: MEDICAID

## 2024-11-21 ENCOUNTER — APPOINTMENT (OUTPATIENT)
Facility: HOSPITAL | Age: 34
End: 2024-11-21
Payer: MEDICAID

## 2024-11-21 VITALS
BODY MASS INDEX: 39.76 KG/M2 | SYSTOLIC BLOOD PRESSURE: 145 MMHG | HEART RATE: 80 BPM | DIASTOLIC BLOOD PRESSURE: 98 MMHG | WEIGHT: 300 LBS | TEMPERATURE: 97.5 F | HEIGHT: 73 IN | OXYGEN SATURATION: 96 % | RESPIRATION RATE: 23 BRPM

## 2024-11-21 DIAGNOSIS — R07.9 CHEST PAIN, UNSPECIFIED TYPE: Primary | ICD-10-CM

## 2024-11-21 LAB
ALBUMIN SERPL-MCNC: 4.1 G/DL (ref 3.4–5)
ALBUMIN/GLOB SERPL: 1.2 (ref 0.8–1.7)
ALP SERPL-CCNC: 94 U/L (ref 45–117)
ALT SERPL-CCNC: 65 U/L (ref 16–61)
ANION GAP SERPL CALC-SCNC: 6 MMOL/L (ref 3–18)
AST SERPL-CCNC: 35 U/L (ref 10–38)
BASOPHILS # BLD: 0.1 K/UL (ref 0–0.1)
BASOPHILS NFR BLD: 1 % (ref 0–2)
BILIRUB SERPL-MCNC: 0.5 MG/DL (ref 0.2–1)
BUN SERPL-MCNC: 14 MG/DL (ref 7–18)
BUN/CREAT SERPL: 14 (ref 12–20)
CALCIUM SERPL-MCNC: 9.9 MG/DL (ref 8.5–10.1)
CHLORIDE SERPL-SCNC: 102 MMOL/L (ref 100–111)
CO2 SERPL-SCNC: 29 MMOL/L (ref 21–32)
CREAT SERPL-MCNC: 1.01 MG/DL (ref 0.6–1.3)
DIFFERENTIAL METHOD BLD: ABNORMAL
EKG ATRIAL RATE: 82 BPM
EKG DIAGNOSIS: NORMAL
EKG P AXIS: 55 DEGREES
EKG P-R INTERVAL: 176 MS
EKG Q-T INTERVAL: 382 MS
EKG QRS DURATION: 98 MS
EKG QTC CALCULATION (BAZETT): 446 MS
EKG R AXIS: 27 DEGREES
EKG T AXIS: 72 DEGREES
EKG VENTRICULAR RATE: 82 BPM
EOSINOPHIL # BLD: 0.2 K/UL (ref 0–0.4)
EOSINOPHIL NFR BLD: 2 % (ref 0–5)
ERYTHROCYTE [DISTWIDTH] IN BLOOD BY AUTOMATED COUNT: 12.8 % (ref 11.6–14.5)
GLOBULIN SER CALC-MCNC: 3.3 G/DL (ref 2–4)
GLUCOSE SERPL-MCNC: 129 MG/DL (ref 74–99)
HCT VFR BLD AUTO: 46 % (ref 36–48)
HGB BLD-MCNC: 15.3 G/DL (ref 13–16)
IMM GRANULOCYTES # BLD AUTO: 0.1 K/UL (ref 0–0.04)
IMM GRANULOCYTES NFR BLD AUTO: 1 % (ref 0–0.5)
LYMPHOCYTES # BLD: 3.3 K/UL (ref 0.9–3.6)
LYMPHOCYTES NFR BLD: 35 % (ref 21–52)
MCH RBC QN AUTO: 30.5 PG (ref 24–34)
MCHC RBC AUTO-ENTMCNC: 33.3 G/DL (ref 31–37)
MCV RBC AUTO: 91.6 FL (ref 78–100)
MONOCYTES # BLD: 1 K/UL (ref 0.05–1.2)
MONOCYTES NFR BLD: 10 % (ref 3–10)
NEUTS SEG # BLD: 5 K/UL (ref 1.8–8)
NEUTS SEG NFR BLD: 52 % (ref 40–73)
NRBC # BLD: 0 K/UL (ref 0–0.01)
NRBC BLD-RTO: 0 PER 100 WBC
PLATELET # BLD AUTO: 328 K/UL (ref 135–420)
PMV BLD AUTO: 9.2 FL (ref 9.2–11.8)
POTASSIUM SERPL-SCNC: 3.9 MMOL/L (ref 3.5–5.5)
PROT SERPL-MCNC: 7.4 G/DL (ref 6.4–8.2)
RBC # BLD AUTO: 5.02 M/UL (ref 4.35–5.65)
SODIUM SERPL-SCNC: 137 MMOL/L (ref 136–145)
TROPONIN I SERPL HS-MCNC: 6 NG/L (ref 0–78)
TROPONIN I SERPL HS-MCNC: 7 NG/L (ref 0–78)
WBC # BLD AUTO: 9.6 K/UL (ref 4.6–13.2)

## 2024-11-21 PROCEDURE — 93010 ELECTROCARDIOGRAM REPORT: CPT | Performed by: INTERNAL MEDICINE

## 2024-11-21 PROCEDURE — 71045 X-RAY EXAM CHEST 1 VIEW: CPT

## 2024-11-21 PROCEDURE — 99285 EMERGENCY DEPT VISIT HI MDM: CPT

## 2024-11-21 PROCEDURE — 93005 ELECTROCARDIOGRAM TRACING: CPT | Performed by: EMERGENCY MEDICINE

## 2024-11-21 PROCEDURE — 94761 N-INVAS EAR/PLS OXIMETRY MLT: CPT

## 2024-11-21 PROCEDURE — 80053 COMPREHEN METABOLIC PANEL: CPT

## 2024-11-21 PROCEDURE — 84484 ASSAY OF TROPONIN QUANT: CPT

## 2024-11-21 PROCEDURE — 85025 COMPLETE CBC W/AUTO DIFF WBC: CPT

## 2024-11-21 ASSESSMENT — PAIN SCALES - GENERAL: PAINLEVEL_OUTOF10: 6

## 2024-11-21 ASSESSMENT — PAIN DESCRIPTION - LOCATION: LOCATION: CHEST;ARM

## 2024-11-21 ASSESSMENT — PAIN DESCRIPTION - ONSET: ONSET: SUDDEN

## 2024-11-21 ASSESSMENT — PAIN DESCRIPTION - ORIENTATION: ORIENTATION: LEFT

## 2024-11-21 ASSESSMENT — PAIN DESCRIPTION - DESCRIPTORS: DESCRIPTORS: NUMBNESS;TIGHTNESS

## 2024-11-21 ASSESSMENT — PAIN DESCRIPTION - FREQUENCY: FREQUENCY: CONTINUOUS

## 2024-11-21 ASSESSMENT — PAIN - FUNCTIONAL ASSESSMENT
PAIN_FUNCTIONAL_ASSESSMENT: 0-10
PAIN_FUNCTIONAL_ASSESSMENT: ACTIVITIES ARE NOT PREVENTED

## 2024-11-21 ASSESSMENT — PAIN DESCRIPTION - PAIN TYPE: TYPE: ACUTE PAIN

## 2024-11-21 NOTE — ED TRIAGE NOTES
Patient complains of left sided chest pain and left arm numbness with dizziness. Started about 30 minutes ago. Went to a house party. Denies nausea, vomiting.

## 2024-11-21 NOTE — ED NOTES
Patient escorted via wheel chair to Room 15. Patient set up to vital sign monitoring devices. Patient changed into hospital gown. Call bell within reach.

## 2024-11-21 NOTE — ED PROVIDER NOTES
EMERGENCY DEPARTMENT HISTORY AND PHYSICAL EXAM    4:11 AM EST saw and evaluated in the triage room        Date: 11/21/2024  Patient Name: Chaparro Florian    History of Presenting Illness     Chief Complaint   Patient presents with    Chest Pain    Numbness     Left arm numbness         History Provided By: patient    Additional History (Context): Chaparro Florian is a 34 y.o. male presents with history of hypertension but does not take his medications, was partying tonight had a couple of beers and smoked some weed then developed left-sided chest pain left arm pain and then some numbness feeling going through his chest and into his arm very minimal symptoms at this time.  He thought he was getting out of bed but decided to come to the hospital and get checked out.  No prior cardiac history or stroke history..    PCP: None, None    Chief Complaint:   Duration:    Timing:    Location:   Quality:   Severity:   Modifying Factors:   Associated Symptoms:       No current facility-administered medications for this encounter.     Current Outpatient Medications   Medication Sig Dispense Refill    albuterol sulfate HFA (PROVENTIL;VENTOLIN;PROAIR) 108 (90 Base) MCG/ACT inhaler Inhale 1-2 puffs into the lungs every 4 hours as needed      amphetamine-dextroamphetamine (ADDERALL) 30 MG tablet Take 1 tablet by mouth 2 times daily.      Calcium Carb-Cholecalciferol 600-20 MG-MCG CHEW Take 1 tablet by mouth daily      carbamide peroxide (DEBROX) 6.5 % otic solution Place 5 drops in ear(s) 2 times daily      clindamycin (CLEOCIN) 150 MG capsule Take 450 mg by mouth 3 times daily      diclofenac sodium (VOLTAREN) 1 % GEL Apply 4 g topically 4 times daily      famotidine (PEPCID) 40 MG tablet ceived the following from [facility]: Outside name: famotidine (PEPCID) 40 mg tablet      fluticasone (FLONASE) 50 MCG/ACT nasal spray 2 sprays by Nasal route daily      guaiFENesin (MUCINEX) 600 MG extended release  negative troponin, heart score will be 1, for history of hypertension.    ED Course: Progress Notes, Reevaluation, and Consults:  ED Course as of 11/21/24 0641   Thu Nov 21, 2024   0639 My interpretation frontal view of the chest no acute process [CB]      ED Course User Index  [CB] Jonny Weber MD     Discussed finding with the patient, no signs of stroke or heart attack he is happy with plan for discharge.    I am the first provider for this patient.    I reviewed the vital signs, available nursing notes, past medical history, past surgical history, family history and social history.    Patient Vitals for the past 12 hrs:   Temp Pulse Resp BP SpO2   11/21/24 0408 97.5 °F (36.4 °C) 78 18 (!) 170/97 100 %       Vital Signs-Reviewed the patient's vital signs.    Pulse Oximetry Analysis, Cardiac Monitor, 12 lead ekg:      Interpreted by the EP.      Records Reviewed: Nursing notes reviewed (Time of Review: 6:41 AM)    Procedures:   Critical Care Time: 0  If critical care time is note it is exclusive of any separately billable procedures.     Aspirin: (was aspirin given for stroke?)    Diagnosis     Disposition: DISPOSITION Decision To Discharge 11/21/2024 06:41:17 AM            DISCHARGE MEDICATIONS:  Current Discharge Medication List             Details   albuterol sulfate HFA (PROVENTIL;VENTOLIN;PROAIR) 108 (90 Base) MCG/ACT inhaler Inhale 1-2 puffs into the lungs every 4 hours as needed      amphetamine-dextroamphetamine (ADDERALL) 30 MG tablet Take 1 tablet by mouth 2 times daily.      Calcium Carb-Cholecalciferol 600-20 MG-MCG CHEW Take 1 tablet by mouth daily      carbamide peroxide (DEBROX) 6.5 % otic solution Place 5 drops in ear(s) 2 times daily      clindamycin (CLEOCIN) 150 MG capsule Take 450 mg by mouth 3 times daily      diclofenac sodium (VOLTAREN) 1 % GEL Apply 4 g topically 4 times daily      famotidine (PEPCID) 40 MG tablet ceived the following from [facility]: Outside name: